# Patient Record
Sex: MALE | Race: WHITE | NOT HISPANIC OR LATINO | Employment: OTHER | URBAN - METROPOLITAN AREA
[De-identification: names, ages, dates, MRNs, and addresses within clinical notes are randomized per-mention and may not be internally consistent; named-entity substitution may affect disease eponyms.]

---

## 2017-01-09 ENCOUNTER — HOSPITAL ENCOUNTER (OUTPATIENT)
Dept: CARDIOLOGY | Facility: CLINIC | Age: 82
Discharge: HOME | End: 2017-01-09
Attending: INTERNAL MEDICINE | Admitting: INTERNAL MEDICINE
Payer: MEDICARE

## 2017-01-09 PROCEDURE — 93294 REM INTERROG EVL PM/LDLS PM: CPT | Performed by: INTERNAL MEDICINE

## 2017-01-09 PROCEDURE — 93296 REM INTERROG EVL PM/IDS: CPT

## 2017-01-09 NOTE — PROGRESS NOTES
Latitude NXT Remote PPM Device Check  AP: 1 % :100 %  Mode: DDD        Presenting Rhythm: AS/  Heart Rate: Adequate per the histograms  Sensing: Stable    Pacing Threshold: Stable    Impedance: Stable  Battery Status: 9.5 yrs remain  Atrial Arrhythmia: 5 PMT episodes. No EGMs.  Ventricular Arrhythmia: None.     Care Plan: Latitude NXT q 3 months. Pt called with the results.

## 2017-01-16 DIAGNOSIS — K21.9 GASTROESOPHAGEAL REFLUX DISEASE WITHOUT ESOPHAGITIS: Primary | ICD-10-CM

## 2017-01-16 NOTE — TELEPHONE ENCOUNTER
Pepcid      Last Written Prescription Date: 11/17/2016  Last Fill Quantity: 30,  # refills: 0   Last Office Visit with G, [facility] or [facility] prescribing provider: 12/5/2016                                         Next 5 appointments (look out 90 days)     Jan 24, 2017  3:30 PM   Office Visit with Patricia Osorio [facility] ([facility])    [address] [address] [phone]

## 2017-01-18 RX ORDER — FAMOTIDINE 40 MG/1
40 TABLET, FILM COATED ORAL AT BEDTIME
Qty: 30 TABLET | Refills: 5 | Status: SHIPPED | OUTPATIENT
Start: 2017-01-18 | End: 2017-01-24 | Stop reason: ALTCHOICE

## 2017-01-18 NOTE — TELEPHONE ENCOUNTER
Prescription approved per [facility] Refill Protocol.    Ellie FERNANDEZ RN

## 2017-01-23 ENCOUNTER — CARE COORDINATION (OUTPATIENT)
Dept: CARE COORDINATION | Facility: CLINIC | Age: 82
End: 2017-01-23

## 2017-01-23 NOTE — PROGRESS NOTES
Clinic Care Coordination Contact  Care Team Conversations    RN CC has been in communication with their daughter, Roseann, assisting her with education around this process. RN CC left a VM for Roseann to get an update on where the kids are at in the process and to see if they need any assistance.     RN CC will wait for a call back from daughter or will plan to outreach again in 5-20 business days.     Roseann Cheung RN-BSN  RN Clinic Care Coordinator  [facility]  [phone]

## 2017-01-24 ENCOUNTER — OFFICE VISIT (OUTPATIENT)
Dept: PHARMACY | Facility: CLINIC | Age: 82
End: 2017-01-24
Payer: COMMERCIAL

## 2017-01-24 ENCOUNTER — TELEPHONE (OUTPATIENT)
Dept: FAMILY MEDICINE | Facility: CLINIC | Age: 82
End: 2017-01-24

## 2017-01-24 DIAGNOSIS — N40.1 BENIGN PROSTATIC HYPERPLASIA WITH LOWER URINARY TRACT SYMPTOMS, UNSPECIFIED MORPHOLOGY: ICD-10-CM

## 2017-01-24 DIAGNOSIS — K21.9 GASTROESOPHAGEAL REFLUX DISEASE WITHOUT ESOPHAGITIS: Primary | ICD-10-CM

## 2017-01-24 DIAGNOSIS — I25.10 ARTERIOSCLEROTIC HEART DISEASE: ICD-10-CM

## 2017-01-24 DIAGNOSIS — F32.A DEPRESSION, UNSPECIFIED DEPRESSION TYPE: ICD-10-CM

## 2017-01-24 DIAGNOSIS — K59.00 CONSTIPATION, UNSPECIFIED CONSTIPATION TYPE: ICD-10-CM

## 2017-01-24 DIAGNOSIS — I10 BENIGN ESSENTIAL HYPERTENSION: ICD-10-CM

## 2017-01-24 DIAGNOSIS — M19.90 OSTEOARTHRITIS, UNSPECIFIED OSTEOARTHRITIS TYPE, UNSPECIFIED SITE: ICD-10-CM

## 2017-01-24 DIAGNOSIS — E11.8 CONTROLLED TYPE 2 DIABETES MELLITUS WITH COMPLICATION, WITHOUT LONG-TERM CURRENT USE OF INSULIN (H): ICD-10-CM

## 2017-01-24 DIAGNOSIS — E78.5 HYPERLIPIDEMIA LDL GOAL <100: ICD-10-CM

## 2017-01-24 PROCEDURE — 99605 MTMS BY PHARM NP 15 MIN: CPT | Performed by: PHARMACIST

## 2017-01-24 PROCEDURE — 99607 MTMS BY PHARM ADDL 15 MIN: CPT | Performed by: PHARMACIST

## 2017-01-24 RX ORDER — FAMOTIDINE 20 MG/1
20 TABLET, FILM COATED ORAL PRN
COMMUNITY

## 2017-01-24 NOTE — PATIENT INSTRUCTIONS
1. Stop taking fenofibrate 48 mg daily. Monitor your neck pain.  2. Start taking amlodipine 10 mg in the evening. Monitor your lightheadedness during the daytime.  3. Call the pharmacy and get a new prescription for Nitrostat.   4. Stop taking the stool softener plus stimulant laxative.  docusate sodium (stool softener) 100 mg once daily.    Your next appointment is on Tuesday, February 21st at 2:30 pm.    Patricia Osorio, PharmD  [phone] in clinic on Tuesday and Wednesday

## 2017-01-24 NOTE — TELEPHONE ENCOUNTER
Forms from [employer]   Were completed for diabetic supply and faxed back   Sent to scan     Meredith Mcbride  Clinic Station

## 2017-01-24 NOTE — MR AVS SNAPSHOT
After Visit Summary   1/24/2017    Derrick Silva    MRN: [id number]           Patient Information     Date Of Birth          3/7/1932        Visit Information        Provider Department      1/24/2017 3:30 PM Patricia Osorio [facility]        Care Instructions    1. Stop taking fenofibrate 48 mg daily. Monitor your neck pain.  2. Start taking amlodipine 10 mg in the evening. Monitor your lightheadedness during the daytime.  3. Call the pharmacy and get a new prescription for Nitrostat.   4. Stop taking the stool softener plus stimulant laxative.  docusate sodium (stool softener) 100 mg once daily.    Your next appointment is on Tuesday, February 21st at 2:30 pm.    Patricia Osorio, PharmD  [phone] in clinic on Tuesday and Wednesday          Follow-ups after your visit        Your next 10 appointments already scheduled     Jan 24, 2017  3:30 PM   Office Visit with Patricia Osorio [facility] ([facility])    [address] [address]   [phone]           Bring a current list of meds and any records pertaining to this visit.  For Physicals, please bring immunization records and any forms needing to be filled out.  Please arrive 10 minutes early to complete paperwork.            Feb 21, 2017  2:30 PM   SHORT with Patricia Osorio [facility] ([facility])    [address] [address]   [phone]            May 01, 2017  1:00 PM   Remote PPM Check with [facility]   [facility] ([facility])    [address] [address]   [phone]           This appointment is for a remote check of your pacemaker.  This is not an appointment at the office.              Who to contact     If you have questions or need follow up information about today's clinic visit or  "your schedule please contact [facility] directly at [phone].  Normal or non-critical lab and imaging results will be communicated to you by MyChart, letter or phone within 4 business days after the clinic has received the results. If you do not hear from us within 7 days, please contact the clinic through ixigohart or phone. If you have a critical or abnormal lab result, we will notify you by phone as soon as possible.  Submit refill requests through [employer] or call your pharmacy and they will forward the refill request to us. Please allow 3 business days for your refill to be completed.          Additional Information About Your Visit        ixigoharPocketSuite Information     [employer] lets you send messages to your doctor, view your test results, renew your prescriptions, schedule appointments and more. To sign up, go to [email or web address] . Click on \"Log in\" on the left side of the screen, which will take you to the Welcome page. Then click on \"Sign up Now\" on the right side of the page.     You will be asked to enter the access code listed below, as well as some personal information. Please follow the directions to create your username and password.     Your access code is: [email or web address]  Expires: 2017  3:28 PM     Your access code will  in 90 days. If you need help or a new code, please call your [place] clinic or [phone].        Care EveryWhere ID     This is your Care EveryWhere ID. This could be used by other organizations to access your [place] medical records  [id number]         Blood Pressure from Last 3 Encounters:   16 111/54   16 109/52   11/10/16 127/66    Weight from Last 3 Encounters:   16 201 lb (91.173 kg)   16 202 lb 9.6 oz (91.899 kg)   11/10/16 200 lb (90.719 kg)              Today, you had the following     No orders found for display         Today's Medication Changes          These changes are accurate as of: 17  3:28 PM.  If " you have any questions, ask your nurse or doctor.               These medicines have changed or have updated prescriptions.        Dose/Directions    PEPCID 20 MG tablet   This may have changed:  Another medication with the same name was removed. Continue taking this medication, and follow the directions you see here.   Generic drug:  famotidine   Changed by:  Patricia Osorio RPH        Dose:  20 mg   Take 20 mg by mouth as needed   Refills:  0         Stop taking these medicines if you haven't already. Please contact your care team if you have questions.     fenofibrate 48 MG tablet   Stopped by:  Patricia Osorio RPH                    Primary Care Provider Office Phone # Fax #    Stefan Erasmo Trejo [phone]       [address] [address]        Thank you!     Thank you for choosing [facility]  for your care. Our goal is always to provide you with excellent care. Hearing back from our patients is one way we can continue to improve our services. Please take a few minutes to complete the written survey that you may receive in the mail after your visit with us. Thank you!             Your Updated Medication List - Protect others around you: Learn how to safely use, store and throw away your medicines at www.disposemymeds.org.          This list is accurate as of: 1/24/17  3:28 PM.  Always use your most recent med list.                   Brand Name Dispense Instructions for use    acetaminophen 500 MG Caps      Take 1 tablet by mouth At Bedtime       amLODIPine 10 MG tablet    NORVASC    90 tablet    Take 1 tablet (10 mg) by mouth daily       aspirin 81 MG tablet     30 tablet    Take 1 tablet (81 mg) by mouth daily       blood glucose monitoring test strip    ACCU-CHEK GEOFFREY    100 each    Use to test blood sugars 1 times daily or as directed.       docusate sodium 100 MG capsule    COLACE      Take 100 mg by mouth daily       glyBURIDE 5 MG tablet    DIABETA /MICRONASE    90 tablet    Take 1 tablet (5 mg) by mouth daily (with breakfast)       guaiFENesin 200 MG Tabs tablet    ORGANIDIN     Take 2 tablets (400 mg) by mouth nightly as needed for cough       losartan 50 MG tablet    COZAAR    90 tablet    Take 1 tablet (50 mg) by mouth daily       metFORMIN 1000 MG tablet    GLUCOPHAGE    180 tablet    Take 1 tablet (1,000 mg) by mouth 2 times daily (with meals)       metoprolol 50 MG 24 hr tablet    TOPROL-XL    135 tablet    Take 1 in AM (50 mg) and 1/2 in PM (25 mg)       nitroglycerin 0.4 MG sublingual tablet    NITROSTAT    60 tablet    Place 1 tablet (0.4 mg) under the tongue every 5 minutes as needed       order for DME     1 Units    Equipment being ordered: Thigh high compression stockings       PEPCID 20 MG tablet   Generic drug:  famotidine      Take 20 mg by mouth as needed       sertraline 50 MG tablet    ZOLOFT    90 tablet    1 tablet orally daily       simvastatin 40 MG tablet    ZOCOR    90 tablet    Take 0.5 tablets (20 mg) by mouth At Bedtime       tamsulosin 0.4 MG capsule    FLOMAX    90 capsule    Take 1 capsule (0.4 mg) by mouth daily       traMADol 50 MG tablet    ULTRAM    45 tablet    Take 1 tablet (50 mg) by mouth every 8 hours as needed for severe pain maximum 3 tablet(s) per day

## 2017-01-25 DIAGNOSIS — I25.10 CAD (CORONARY ARTERY DISEASE): Primary | ICD-10-CM

## 2017-01-25 RX ORDER — NITROGLYCERIN 0.4 MG/1
0.4 TABLET SUBLINGUAL EVERY 5 MIN PRN
Qty: 60 TABLET | Refills: 0 | Status: SHIPPED | OUTPATIENT
Start: 2017-01-25

## 2017-01-25 NOTE — TELEPHONE ENCOUNTER
Nitrostat      Last Written Prescription Date: 01/30/2014  Last Fill Quantity: 60,  # refills: 2   Last Office Visit with [facility] or [facility] prescribing provider: 12/05/2016                                         Next 5 appointments (look out 90 days)     Feb 21, 2017  2:30 PM   SHORT with Patricia Osorio [facility] ([facility])    [address] [address]   [phone]                  Geovany HENRIQUEZ (R)

## 2017-01-25 NOTE — TELEPHONE ENCOUNTER
Prescription approved per [facility] Refill Protocol.    Ellie FERNANDEZ RN

## 2017-01-27 NOTE — PROGRESS NOTES
SUBJECTIVE/OBJECTIVE:                                                    Derrick Silva is a 84 year old male coming in for an initial visit for Medication Therapy Management.  He was referred to me from [facility]. Patient is here today with his wife, daughter and son.     Chief Complaint: review medications.    Allergies/ADRs: None  Tobacco: No tobacco use   Alcohol: not currently using    PMH: Reviewed in Epic    Medication Adherence: sets up own med boxes, patient's daughter asked that we review patient's pill box to make sure he is setting his medications up correctly.    GERD: taking Pepcid 20 mg at bedtime as needed for heartburn. Working well taking it as needed. This medication patient sets next to his pill box since it is as needed.    Diabetes:  Pt currently taking glyburide 5 mg daily with breakfast and metformin 1000 mg twice daily with meals, Pt is not experiencing side effects.  SMBG: one time daily.   Ranges (patient reported): blood sugars around 123 mornings.   Frequency of hypoglycemia? never.  Eye exam: up to date  Foot exam: up to date  Microalbumin is not < 30 mg/g. Pt is taking an ACEi/ARB.  Aspirin: Taking 81mg daily and denies side effects  Patient gets a yearly flu vaccine and has had both pneumonia vaccines.     Hyperlipidemia: Current therapy includes simvastatin 40 mg daily and fenofibrate 48 mg daily. Patient reports the following possible side effects: neck pain     Hypertension/CAD: Current medications include amlodipine 10 mg in the morning, metoprolol 50 mg in the morning and 25 mg at bedtime and losartan 50 mg in the morning. Patient is also on a daily aspirin 81 mg and has NitroStat in his pocket which is . Patient reports the following medication side effects: orthostatic lightheadedness.    BPH: taking tamsulosin 0.4 mg at bedtime. Patient has noticed an improvement.    Neck pain: taking tramadol 50 mg twice daily. Also taking acetaminophen 500 mg at night.  Both of these are helping with patient's neck pain.     Depression: taking sertraline 50 mg daily. Patient would like me to do the PHQ-9 at a follow up visit when he is by himself.     PHQ-9 SCORE 8/11/2015 9/9/2015 3/14/2016   Total Score 13 - -   Total Score - 17 9     Constipation: patient is currently taking Senna S once daily. He is having too loose of stools.     Current labs include:  BP Readings from Last 3 Encounters:   12/05/16 111/54   11/21/16 109/52   11/10/16 127/66     Today's Vitals: Due to time constraints, blood pressure was not taken today.   A1C      5.8   9/19/2016.  CHOL      158   9/19/2016  TRIG      178   9/19/2016  HDL       37   9/19/2016  LDL       85   9/19/2016  LDL      119   9/9/2015    Liver Function Studies -   Recent Labs   Lab Test  11/10/16   1101   PROTTOTAL  7.3   ALBUMIN  3.8   BILITOTAL  0.3   ALKPHOS  54   AST  20   ALT  37       Lab Results   Component Value Date    UCRR 86 09/19/2016    MICROL 31 09/19/2016    UMALCR 35.86* 09/19/2016       Last Basic Metabolic Panel:  NA      138   11/14/2016   POTASSIUM      4.4   11/14/2016  CHLORIDE      107   11/14/2016  BUN       33   11/14/2016  CR     1.79   11/14/2016  GFR ESTIMATE   Date Value Ref Range Status   11/14/2016 36* >60 mL/min/1.7m2 Final     Comment:     Non  GFR Calc   11/10/2016 38* >60 mL/min/1.7m2 Final     Comment:     Non  GFR Calc   09/19/2016 40* >60 mL/min/1.7m2 Final     Comment:     Non  GFR Calc     GFR ESTIMATE IF BLACK   Date Value Ref Range Status   11/14/2016 44* >60 mL/min/1.7m2 Final     Comment:      GFR Calc   11/10/2016 45* >60 mL/min/1.7m2 Final     Comment:      GFR Calc   09/19/2016 48* >60 mL/min/1.7m2 Final     Comment:      GFR Calc     TSH   Date Value Ref Range Status   09/09/2015 1.14 0.40 - 4.00 mU/L Final   ]    Most Recent Immunizations   Administered Date(s) Administered     Influenza (High  Dose) 3 valent vaccine 10/03/2016     Influenza (IIV3) 11/22/2011     Influenza Vaccine IM 3yrs+ 4 Valent IIV4 12/27/2013     Pneumococcal (PCV 13) 10/03/2016     Pneumococcal 23 valent 10/06/2000     TD (ADULT, 7+) 10/06/2000     TDAP (ADACEL AGES 11-64) 01/15/2014     Varicella 01/03/2002     ASSESSMENT:                                                       Current medications were reviewed today. Medicare Part D topics discussed:OTC products, Reminder to refill/ medication, Medication changes and Timing of medication    Medication Adherence: no issues identified, patient had his pill box set up correctly.    GERD: stable    Diabetes: Stable. Patient is meeting A1c goal of < 7%. Glyburide is on the Beer's list due to long acting metabolites contributing to greater risk of prolonged hyperglycemia. Currently not a problem for patient, not having problems with hypoglycemia. I will revisit this at follow up and switch to glipizide.     Hyperlipidemia: Needs Improvement. Pt is on moderate intensity statin which is indicated based on 2013 ACC/AHA guidelines for lipid management. Statins are no longer recommended to be used with fenofibrates due to increased risk of muscle pain. Per 2013 ACC/ADA guidelines triglycerides are recommended to be treated if >/= 500, fenofibrate was added when patient's triglycerides were >200. The addition of the fenofibrate may be contributing to patient's neck pain. I will stop the fenofibrate and monitor neck pain.     Hypertension/CAD: Stable. Patient is meeting BP goal of < 140/90mmHg. I asked patient to start taking amlodipine in the evening to help prevent lightheadedness and improved effectiveness.     BPH: stable    Neck pain: stable - will continue to monitor since stopping fenofibrate.     Depression: will revisit this at follow up and do a PHQ-9.    Constipation: asked patient to stop taking Senna S and switch to docusate sodium 100 mg once daily (see Plan).     PLAN:                                                       1. Stop taking fenofibrate 48 mg daily. Monitor your neck pain.  2. Start taking amlodipine 10 mg in the evening. Monitor your lightheadedness during the daytime.  3. Call the pharmacy and get a new Nitrostat.   4. Stop taking the stool softener plus stimulant laxative.  docusate sodium (stool softener) 100 mg once daily.    At follow up do PHQ-9. Change glyburide to glipizide XL 5 mg daily.    I spent 60 minutes with this patient today (an extra 15 minutes was spent creating the Medication Action Plan).  All changes were made via collaborative practice agreement with Stefan Trejo. A copy of the visit note was provided to the patient's primary care provider.    Will follow up in 1 month.    The patient was given a summary of these recommendations as an after visit summary.     Patricia Osorio, PharmD  Medication Therapy Management

## 2017-02-01 ENCOUNTER — TELEPHONE (OUTPATIENT)
Dept: FAMILY MEDICINE | Facility: CLINIC | Age: 82
End: 2017-02-01

## 2017-02-02 ENCOUNTER — MEDICAL CORRESPONDENCE (OUTPATIENT)
Dept: HEALTH INFORMATION MANAGEMENT | Facility: CLINIC | Age: 82
End: 2017-02-02

## 2017-02-03 ENCOUNTER — OFFICE VISIT (OUTPATIENT)
Dept: FAMILY MEDICINE | Facility: CLINIC | Age: 82
End: 2017-02-03
Payer: COMMERCIAL

## 2017-02-03 VITALS
TEMPERATURE: 96.7 F | SYSTOLIC BLOOD PRESSURE: 115 MMHG | OXYGEN SATURATION: 99 % | HEIGHT: 66 IN | BODY MASS INDEX: 31.66 KG/M2 | HEART RATE: 70 BPM | DIASTOLIC BLOOD PRESSURE: 59 MMHG | WEIGHT: 197 LBS

## 2017-02-03 DIAGNOSIS — J06.9 UPPER RESPIRATORY TRACT INFECTION, UNSPECIFIED TYPE: Primary | ICD-10-CM

## 2017-02-03 DIAGNOSIS — E11.8 CONTROLLED TYPE 2 DIABETES MELLITUS WITH COMPLICATION, WITHOUT LONG-TERM CURRENT USE OF INSULIN (H): ICD-10-CM

## 2017-02-03 DIAGNOSIS — H61.23 BILATERAL IMPACTED CERUMEN: ICD-10-CM

## 2017-02-03 PROCEDURE — 69210 REMOVE IMPACTED EAR WAX UNI: CPT | Mod: 50 | Performed by: FAMILY MEDICINE

## 2017-02-03 PROCEDURE — 99214 OFFICE O/P EST MOD 30 MIN: CPT | Mod: 25 | Performed by: FAMILY MEDICINE

## 2017-02-03 RX ORDER — AZITHROMYCIN 250 MG/1
250 TABLET, FILM COATED ORAL DAILY
Qty: 6 TABLET | Refills: 1 | Status: SHIPPED | OUTPATIENT
Start: 2017-02-03 | End: 2017-03-28

## 2017-02-03 NOTE — PROGRESS NOTES
Clinic Care Coordination Contact  Care Team Conversations    RN CC received a message from pt's daughter, Roseann. She wanted to let RN CC know that she and her siblinigs are looking into MATILDE and need not assistance at this time. She also notes they met with MTM and it was a great visit.     RN CC will outreach in 1-2 months and plan to close if pt/family needs no further resources.     Roseann Cheung, RN-BSN  RN Clinic Care Coordinator  [facility]  [phone]

## 2017-02-03 NOTE — PROGRESS NOTES
"a  SUBJECTIVE:                                                    Derrick Silva is 84 year old male   Chief Complaint   Patient presents with     URI     ENT Symptoms             Symptoms: cc Present Absent Comment   Fever/Chills  x  Chills at night   Fatigue  x     Muscle Aches  x     Eye Irritation   x    Sneezing   x    Nasal Rashid/Drg  x  Nasal drainage   Sinus Pressure/Pain x x  A lot of sinus pressure   Loss of smell   x    Dental pain   x    Sore Throat   x    Swollen Glands  x  Sore neck glands   Ear Pain/Fullness   x    Cough x x  Dry cough   Wheeze   x    Chest Pain  x  intermittent   Shortness of breath   x    Rash   x    Other x x  \"horrible headache\"     Symptom duration:  1 week   Symptom severity: Moderate   Treatments tried:  Cough drops, cough syrup   Contacts:  None         Problem list and histories reviewed & adjusted, as indicated.  Additional history: as documented    Patient Active Problem List   Diagnosis     Osteoarthritis     Benign essential hypertension     Prostate cancer (H)     Adenomatous polyps     Onychia of toe     GERD (gastroesophageal reflux disease)     Arteriosclerotic heart disease     BPH (benign prostatic hyperplasia)     Hyperlipidemia LDL goal <100     Advance Care Planning     Aortic stenosis     Type 2 diabetes mellitus, controlled, with renal complications (H)     Diabetes mellitus type 2, controlled, with complications (H)     Obesity     Past Surgical History   Procedure Laterality Date     Esophagoscopy, gastroscopy, duodenoscopy (egd), combined  10/17/2011     Procedure:COMBINED ESOPHAGOSCOPY, GASTROSCOPY, DUODENOSCOPY (EGD), BIOPSY SINGLE OR MULTIPLE; Surgeon:TOÑO TAYLOR; Location:[facility]     Carpal tunnel release rt/lt  2005     Colonoscopy  2008     Angiogram  2010     Hemorrhoidectomy       Implant pacemaker       Release carpal tunnel  8/15/2014     Procedure: RELEASE CARPAL TUNNEL;  Surgeon: Thom Hill MD;  Location: [facility]       Social " History   Substance Use Topics     Smoking status: Never Smoker      Smokeless tobacco: Never Used     Alcohol Use: Yes      Comment: rare     Family History   Problem Relation Age of Onset     HEART DISEASE Sister      HEART DISEASE Brother      HEART DISEASE Brother      Alcohol/Drug Brother      HEART DISEASE Sister      Neurologic Disorder Daughter      parkinson's         Current Outpatient Prescriptions   Medication Sig Dispense Refill     azithromycin (ZITHROMAX Z-ANDREA) 250 MG tablet Take 1 tablet (250 mg) by mouth daily Two tablets first day, then one tablet daily for four days 6 tablet 1     nitroglycerin (NITROSTAT) 0.4 MG sublingual tablet Place 1 tablet (0.4 mg) under the tongue every 5 minutes as needed 60 tablet 0     famotidine (PEPCID) 20 MG tablet Take 20 mg by mouth as needed       glyBURIDE (DIABETA /MICRONASE) 5 MG tablet Take 1 tablet (5 mg) by mouth daily (with breakfast) 90 tablet 3     traMADol (ULTRAM) 50 MG tablet Take 1 tablet (50 mg) by mouth every 8 hours as needed for severe pain maximum 3 tablet(s) per day 45 tablet 0     metFORMIN (GLUCOPHAGE) 1000 MG tablet Take 1 tablet (1,000 mg) by mouth 2 times daily (with meals) 180 tablet 1     tamsulosin (FLOMAX) 0.4 MG 24 hr capsule Take 1 capsule (0.4 mg) by mouth daily 90 capsule 3     simvastatin (ZOCOR) 40 MG tablet Take 0.5 tablets (20 mg) by mouth At Bedtime 90 tablet 3     amLODIPine (NORVASC) 10 MG tablet Take 1 tablet (10 mg) by mouth daily 90 tablet 3     metoprolol (TOPROL-XL) 50 MG 24 hr tablet Take 1 in AM (50 mg) and 1/2 in PM (25 mg) 135 tablet 5     acetaminophen 500 MG CAPS Take 1 tablet by mouth At Bedtime       guaiFENesin (ORGANIDIN) 200 MG TABS Take 2 tablets (400 mg) by mouth nightly as needed for cough  0     sertraline (ZOLOFT) 50 MG tablet 1 tablet orally daily 90 tablet 3     losartan (COZAAR) 50 MG tablet Take 1 tablet (50 mg) by mouth daily 90 tablet 3     aspirin 81 MG tablet Take 1 tablet (81 mg) by mouth daily 30  "tablet      docusate sodium (COLACE) 100 MG capsule Take 100 mg by mouth daily        blood glucose monitoring (ACCU-CHEK GEOFFREY) test strip Use to test blood sugars 1 times daily or as directed. 100 each 2     ORDER FOR DME Equipment being ordered: Thigh high compression stockings 1 Units 1     Allergies   Allergen Reactions     Nkda [No Known Drug Allergies]      Recent Labs   Lab Test  11/14/16   0916  11/10/16   1101  09/19/16   0847  07/12/16   0852  03/14/16   0901  09/09/15   1523  04/13/15   1142   04/23/14   0846   05/16/13   1137   A1C   --    --   5.8  6.0  6.0  6.0  6.7*   < >  5.9   --    --    LDL   --    --   85  61   --   119  47   < >  53   --    --    HDL   --    --   37*  33*   --    --   31*   --   31*   --    --    TRIG   --    --   178*  207*   --    --   277*   --   220*   --    --    ALT   --   37   --    --    --    --   36   --    --    --   29   CR  1.79*  1.74*  1.65*  1.54*  1.29*  1.36*  1.24   < >  1.31*   < >  1.10   GFRESTIMATED  36*  38*  40*  43*  53*  50*  56*   < >  52*   < >  64   GFRESTBLACK  44*  45*  48*  52*  64  60*  67   < >  63   < >  78   POTASSIUM  4.4  4.6  4.4  4.5  4.2  4.1  4.2   < >  4.6   < >  4.2   TSH   --    --    --    --    --   1.14   --    --   1.61   --    --     < > = values in this interval not displayed.      BP Readings from Last 3 Encounters:   02/03/17 115/59   12/05/16 111/54   11/21/16 109/52    Wt Readings from Last 3 Encounters:   02/03/17 197 lb (89.359 kg)   12/05/16 201 lb (91.173 kg)   11/21/16 202 lb 9.6 oz (91.899 kg)         ROS:  Constitutional, HEENT, cardiovascular, pulmonary, gi and gu systems are negative, except as otherwise noted.    OBJECTIVE:                                                    /59 mmHg  Pulse 70  Temp(Src) 96.7  F (35.9  C) (Tympanic)  Ht 5' 6\" (1.676 m)  Wt 197 lb (89.359 kg)  BMI 31.81 kg/m2  SpO2 99%  GENERAL APPEARANCE ADULT: Alert, no acute distress, obese  HENT: right TM not visualized secondary to " cerumen, left TM not visualized secondary to cerumen, after cerumen cleared TM appear normal. throat/mouth:normal, mucous membranes moist,  cobblestoning and thick mucous posterior pharynx.  RESP: lungs clear to auscultation   CV: normal rate, regular rhythm, no murmur or gallop  Diagnostic Test Results:  none     ASSESSMENT/PLAN:                                                    1. Upper respiratory tract infection, unspecified type  Viral vs bacterial.  Trial of antibiotics but if not effective viral infection and self limiting.  If effective and recurs will repeat, see patient instructions.  - azithromycin (ZITHROMAX Z-ANDREA) 250 MG tablet; Take 1 tablet (250 mg) by mouth daily Two tablets first day, then one tablet daily for four days  Dispense: 6 tablet; Refill: 1    2. Controlled type 2 diabetes mellitus with complication, without long-term current use of insulin (H)  At goal    3. Bilateral impacted cerumen    Cerumenosis is noted.  Wax is removed by syringing with warm sterile water and manual debridement with blue Bionix plastic cerumen curette.  Both ears were treated and large amount of cerumen was removed from both ear canals.  Slight discomfort at end, no bleeding or trauma noted to ear canals or TMs on inspection with otoscope. Treatment initiated by CMA and completed by myself.     - REMOVE IMPACTED CERUMEN    Oxana Piper MD  [facility]

## 2017-02-03 NOTE — PATIENT INSTRUCTIONS
Thank you for choosing [facility].  You may be receiving a survey in the mail from [facility] regarding your visit today.  Please take a few minutes to complete and return the survey to let us know how we are doing.      If you have questions or concerns, please contact us via [employer] or you can contact your care team at [phone].    Our Clinic hours are:  Monday 6:40 am  to 7:00 pm  Tuesday -Friday 6:40 am to 5:00 pm    The [place] outpatient lab hours are:  Monday - Friday 6:10 am to 4:45 pm  Saturdays 7:00 am to 11:00 am  Appointments are required, call [phone]    If you have clinical questions after hours or would like to schedule an appointment,  call the clinic at [phone].   If symptoms resolve with the zithromax and then recur on day 6-8 repeat the zithromax.  If symptoms do not improve or do not recur don't repeat the zithromax.  Take only one tab a day if repeating the pac.  Zithromax is an antibiotic and works against bacteria.  If it didn't work then you don't have a bacterial infection, you have a viral infection and zpac or any other antibiotic won't work.  Recommend rest, fluids and other supportive cares so your immune system can clear the viral infection.  If your illness is getting worse see MD.    Symptomatic therapy suggested: push fluids, rest, gargle warm salt water, use vaporizer or mist needed , use acetaminophen, ibuprofen, decongestant of choice as needed and Return office visit if symptoms persist or worsen. Call or return to clinic prn if these symptoms worsen or fail to improve as anticipated.

## 2017-02-03 NOTE — MR AVS SNAPSHOT
After Visit Summary   2/3/2017    Derrick Silva    MRN: [id number]           Patient Information     Date Of Birth          3/7/1932        Visit Information        Provider Department      2/3/2017 8:20 AM Oxana Piper MD [facility]        Today's Diagnoses     Upper respiratory tract infection, unspecified type    -  1     Controlled type 2 diabetes mellitus with complication, without long-term current use of insulin (H)           Care Instructions          Thank you for choosing [facility].  You may be receiving a [facility] in [facility].  Please take a few minutes to complete and return the survey to let us know how we are doing.      If you have questions or concerns, please contact us via [employer] or you can contact your care team at [phone].    Our Clinic hours are:  Monday 6:40 am  to 7:00 pm  Tuesday -Friday 6:40 am to 5:00 pm    The [place] outpatient lab hours are:  Monday - Friday 6:10 am to 4:45 pm  Saturdays 7:00 am to 11:00 am  Appointments are required, call [phone]    If you have clinical questions after hours or would like to schedule an appointment,  call the clinic at [phone].   If symptoms resolve with the zithromax and then recur on day 6-8 repeat the zithromax.  If symptoms do not improve or do not recur don't repeat the zithromax.  Take only one tab a day if repeating the pac.  Zithromax is an antibiotic and works against bacteria.  If it didn't work then you don't have a bacterial infection, you have a viral infection and zpac or any other antibiotic won't work.  Recommend rest, fluids and other supportive cares so your immune system can clear the viral infection.  If your illness is getting worse see MD.    Symptomatic therapy suggested: push fluids, rest, gargle warm salt water, use vaporizer or mist needed , use acetaminophen, ibuprofen, decongestant of choice as needed and Return  "office visit if symptoms persist or worsen. Call or return to clinic prn if these symptoms worsen or fail to improve as anticipated.          Follow-ups after your visit        Your next 10 appointments already scheduled     Feb 21, 2017  2:30 PM   SHORT with Patricia Osorio, [facility] ([facility])    [address] [place]  [facility] [address]   [phone]            May 01, 2017  1:00 PM   Remote PPM Check with [facility]   [facility] ([facility])    [address]  [facility] [address]   [phone]           This appointment is for a remote check of your pacemaker.  This is not an appointment at the office.              Who to contact     If you have questions or need follow up information about today's clinic visit or your schedule please contact [facility] directly at [phone].  Normal or non-critical lab and imaging results will be communicated to you by Victoria Plumbhart, letter or phone within 4 business days after the clinic has received the results. If you do not hear from us within 7 days, please contact the clinic through Victoria Plumbhart or phone. If you have a critical or abnormal lab result, we will notify you by phone as soon as possible.  Submit refill requests through [facility] or call your pharmacy and they will forward the refill request to us. Please allow 3 business days for your refill to be completed.          Additional Information About Your Visit        [facility] Information     [facility] lets you send messages to your doctor, view your test results, renew your prescriptions, schedule appointments and more. To sign up, go to [email or web address] . Click on \"Log in\" on the left side of the screen, which will take you to the Welcome page. Then click on \"Sign up Now\" on the right side of the page.     You will be asked to enter the access code listed below, as well as some personal " "information. Please follow the directions to create your username and password.     Your access code is: [email or web address]  Expires: 2017  3:28 PM     Your access code will  in 90 days. If you need help or a new code, please call your [facility] or [phone].        Care EveryWhere ID     This is your Care EveryWhere ID. This could be used by other organizations to access your [place] medical records  [id number]        Your Vitals Were     Pulse Temperature Height BMI (Body Mass Index) Pulse Oximetry       70 96.7  F (35.9  C) (Tympanic) 5' 6\" (1.676 m) 31.81 kg/m2 99%        Blood Pressure from Last 3 Encounters:   17 115/59   16 111/54   16 109/52    Weight from Last 3 Encounters:   17 197 lb (89.359 kg)   16 201 lb (91.173 kg)   16 202 lb 9.6 oz (91.899 kg)              Today, you had the following     No orders found for display         Today's Medication Changes          These changes are accurate as of: 2/3/17  8:59 AM.  If you have any questions, ask your nurse or doctor.               Start taking these medicines.        Dose/Directions    azithromycin 250 MG tablet   Commonly known as:  ZITHROMAX Z-ANDREA   Used for:  Upper respiratory tract infection, unspecified type   Started by:  Oxana Piper MD        Dose:  250 mg   Take 1 tablet (250 mg) by mouth daily Two tablets first day, then one tablet daily for four days   Quantity:  6 tablet   Refills:  1            Where to get your medicines      These medications were sent to [facility] - [place], [place] - [address]  [address] [address]     Phone:  [phone]    - azithromycin 250 MG tablet             Primary Care Provider Office Phone # Fax #    Stefan Trejo [phone]       [facility]        [address] [address]        Thank you!     Thank you for choosing [facility]  for " your care. Our goal is always to provide you with excellent care. Hearing back from our patients is one way we can continue to improve our services. Please take a few minutes to complete the written survey that you may receive in the mail after your visit with us. Thank you!             Your Updated Medication List - Protect others around you: Learn how to safely use, store and throw away your medicines at www.disposemymeds.org.          This list is accurate as of: 2/3/17  8:59 AM.  Always use your most recent med list.                   Brand Name Dispense Instructions for use    acetaminophen 500 MG Caps      Take 1 tablet by mouth At Bedtime       amLODIPine 10 MG tablet    NORVASC    90 tablet    Take 1 tablet (10 mg) by mouth daily       aspirin 81 MG tablet     30 tablet    Take 1 tablet (81 mg) by mouth daily       azithromycin 250 MG tablet    ZITHROMAX Z-ANDREA    6 tablet    Take 1 tablet (250 mg) by mouth daily Two tablets first day, then one tablet daily for four days       blood glucose monitoring test strip    ACCU-CHEK GEOFFREY    100 each    Use to test blood sugars 1 times daily or as directed.       docusate sodium 100 MG capsule    COLACE     Take 100 mg by mouth daily       glyBURIDE 5 MG tablet    DIABETA /MICRONASE    90 tablet    Take 1 tablet (5 mg) by mouth daily (with breakfast)       guaiFENesin 200 MG Tabs tablet    ORGANIDIN     Take 2 tablets (400 mg) by mouth nightly as needed for cough       losartan 50 MG tablet    COZAAR    90 tablet    Take 1 tablet (50 mg) by mouth daily       metFORMIN 1000 MG tablet    GLUCOPHAGE    180 tablet    Take 1 tablet (1,000 mg) by mouth 2 times daily (with meals)       metoprolol 50 MG 24 hr tablet    TOPROL-XL    135 tablet    Take 1 in AM (50 mg) and 1/2 in PM (25 mg)       nitroglycerin 0.4 MG sublingual tablet    NITROSTAT    60 tablet    Place 1 tablet (0.4 mg) under the tongue every 5 minutes as needed       order for DME     1 Units    Equipment  being ordered: Thigh high compression stockings       PEPCID 20 MG tablet   Generic drug:  famotidine      Take 20 mg by mouth as needed       sertraline 50 MG tablet    ZOLOFT    90 tablet    1 tablet orally daily       simvastatin 40 MG tablet    ZOCOR    90 tablet    Take 0.5 tablets (20 mg) by mouth At Bedtime       tamsulosin 0.4 MG capsule    FLOMAX    90 capsule    Take 1 capsule (0.4 mg) by mouth daily       traMADol 50 MG tablet    ULTRAM    45 tablet    Take 1 tablet (50 mg) by mouth every 8 hours as needed for severe pain maximum 3 tablet(s) per day

## 2017-02-03 NOTE — NURSING NOTE
"Chief Complaint   Patient presents with     URI       Initial /59 mmHg  Pulse 70  Temp(Src) 96.7  F (35.9  C) (Tympanic)  Ht 5' 6\" (1.676 m)  Wt 197 lb (89.359 kg)  BMI 31.81 kg/m2  SpO2 99% Estimated body mass index is 31.81 kg/(m^2) as calculated from the following:    Height as of this encounter: 5' 6\" (1.676 m).    Weight as of this encounter: 197 lb (89.359 kg).  BP completed using cuff size: regular  "

## 2017-02-21 ENCOUNTER — OFFICE VISIT (OUTPATIENT)
Dept: PHARMACY | Facility: CLINIC | Age: 82
End: 2017-02-21
Payer: COMMERCIAL

## 2017-02-21 VITALS — HEART RATE: 71 BPM | DIASTOLIC BLOOD PRESSURE: 65 MMHG | SYSTOLIC BLOOD PRESSURE: 119 MMHG

## 2017-02-21 DIAGNOSIS — E11.22 CONTROLLED TYPE 2 DIABETES MELLITUS WITH STAGE 3 CHRONIC KIDNEY DISEASE, WITHOUT LONG-TERM CURRENT USE OF INSULIN (H): Primary | ICD-10-CM

## 2017-02-21 DIAGNOSIS — K59.00 CONSTIPATION, UNSPECIFIED CONSTIPATION TYPE: ICD-10-CM

## 2017-02-21 DIAGNOSIS — F32.A DEPRESSION, UNSPECIFIED DEPRESSION TYPE: ICD-10-CM

## 2017-02-21 DIAGNOSIS — M19.90 OSTEOARTHRITIS, UNSPECIFIED OSTEOARTHRITIS TYPE, UNSPECIFIED SITE: ICD-10-CM

## 2017-02-21 DIAGNOSIS — I10 BENIGN ESSENTIAL HYPERTENSION: ICD-10-CM

## 2017-02-21 DIAGNOSIS — N18.30 CONTROLLED TYPE 2 DIABETES MELLITUS WITH STAGE 3 CHRONIC KIDNEY DISEASE, WITHOUT LONG-TERM CURRENT USE OF INSULIN (H): Primary | ICD-10-CM

## 2017-02-21 PROCEDURE — 99606 MTMS BY PHARM EST 15 MIN: CPT | Performed by: PHARMACIST

## 2017-02-21 PROCEDURE — 99607 MTMS BY PHARM ADDL 15 MIN: CPT | Performed by: PHARMACIST

## 2017-02-21 RX ORDER — GLIPIZIDE 5 MG/1
5 TABLET, FILM COATED, EXTENDED RELEASE ORAL DAILY
Qty: 90 TABLET | Refills: 1 | Status: SHIPPED | OUTPATIENT
Start: 2017-02-21 | End: 2017-10-11

## 2017-02-21 NOTE — PATIENT INSTRUCTIONS
1. When your prescription for glyburide 5 mg is done - switch to glipizide XL 5 mg once daily with breakfast. I faxed a prescription to your pharmacy. The reason for this is because glyburide stays in your body for a long time so the risk of your blood sugars going too low is increased. Glyburide is not recommended to be used for people that are >60 years old.    Your next appointment is on Tuesday, April 4th at 2:30 pm.    Patricia Osorio, PharmD  [phone] in clinic on Tuesday and Wednesday

## 2017-02-21 NOTE — MR AVS SNAPSHOT
After Visit Summary   2/21/2017    Derrick Silva    MRN: [id number]           Patient Information     Date Of Birth          3/7/1932        Visit Information        Provider Department      2/21/2017 2:30 PM Patricia Osorio [facility]        Today's Diagnoses     Controlled type 2 diabetes mellitus with stage 3 chronic kidney disease, without long-term current use of insulin (H)    -  1      Care Instructions    1. When your prescription for glyburide 5 mg is done - switch to glipizide XL 5 mg once daily with breakfast. I faxed a prescription to your pharmacy. The reason for this is because glyburide stays in your body for a long time so the risk of your blood sugars going too low is increased. Glyburide is not recommended to be used for people that are >60 years old.    Your next appointment is on Tuesday, April 4th at 2:30 pm.    Patricia Osorio, PharmD  [phone] in clinic on Tuesday and Wednesday                   Follow-ups after your visit        Your next 10 appointments already scheduled     Apr 04, 2017  2:30 PM CDT   SHORT with Patricia Osorio [facility] ([facility])    [address] [id number] [phone]            May 01, 2017  1:00 PM CDT   Remote PPM Check with [facility] [facility]   [facility] ([facility])    [address] [address]   [phone]           This appointment is for a remote check of your pacemaker.  This is not an appointment at the office.              Who to contact     If you have questions or need follow up information about today's clinic visit or your schedule please contact [facility] directly at [phone].  Normal or non-critical lab and imaging results will be communicated to you by MyChart, letter or phone within 4 business days after the clinic has received the  "results. If you do not hear from us within 7 days, please contact the clinic through [facility] or phone. If you have a critical or abnormal lab result, we will notify you by phone as soon as possible.  Submit refill requests through [facility] or call your pharmacy and they will forward the refill request to us. Please allow 3 business days for your refill to be completed.          Additional Information About Your Visit        Explain My [facility] Information     [facility] lets you send messages to your doctor, view your test results, renew your prescriptions, schedule appointments and more. To sign up, go to [email or web address] . Click on \"Log in\" on the left side of the screen, which will take you to the Welcome page. Then click on \"Sign up Now\" on the right side of the page.     You will be asked to enter the access code listed below, as well as some personal information. Please follow the directions to create your username and password.     Your access code is: [email or web address]  Expires: 2017  3:28 PM     Your access code will  in 90 days. If you need help or a new code, please call your [place] clinic or [phone].        Care EveryWhere ID     This is your Care EveryWhere ID. This could be used by other organizations to access your [place] medical records  [id number]         Blood Pressure from Last 3 Encounters:   17 115/59   16 111/54   16 109/52    Weight from Last 3 Encounters:   17 197 lb (89.4 kg)   16 201 lb (91.2 kg)   16 202 lb 9.6 oz (91.9 kg)              Today, you had the following     No orders found for display         Today's Medication Changes          These changes are accurate as of: 17  3:11 PM.  If you have any questions, ask your nurse or doctor.               Start taking these medicines.        Dose/Directions    glipiZIDE 5 MG 24 hr tablet   Commonly known as:  glipiZIDE XL   Used for:  Controlled type 2 diabetes mellitus with stage 3 chronic " kidney disease, without long-term current use of insulin (H)        Dose:  5 mg   Take 1 tablet (5 mg) by mouth daily   Quantity:  90 tablet   Refills:  1         Stop taking these medicines if you haven't already. Please contact your care team if you have questions.     glyBURIDE 5 MG tablet   Commonly known as:  DIABETA /MICRONASE                Where to get your medicines      These medications were sent to [facility] - [place], [place] - [address]   [address] [facility] [address]     Phone:  [phone]     glipiZIDE 5 MG 24 hr tablet                Primary Care Provider Office Phone # Fax #    Stefan Erasmo Trejo [phone]       [facility]        [address] [place] [address]        Thank you!     Thank you for choosing [facility]  for your care. Our goal is always to provide you with excellent care. Hearing back from our patients is one way we can continue to improve our services. Please take a few minutes to complete the written survey that you may receive in the mail after your visit with us. Thank you!             Your Updated Medication List - Protect others around you: Learn how to safely use, store and throw away your medicines at www.disposemymeds.org.          This list is accurate as of: 2/21/17  3:11 PM.  Always use your most recent med list.                   Brand Name Dispense Instructions for use    acetaminophen 500 MG Caps      Take 1 tablet by mouth At Bedtime       amLODIPine 10 MG tablet    NORVASC    90 tablet    Take 1 tablet (10 mg) by mouth daily       aspirin 81 MG tablet     30 tablet    Take 1 tablet (81 mg) by mouth daily       azithromycin 250 MG tablet    ZITHROMAX Z-ANDREA    6 tablet    Take 1 tablet (250 mg) by mouth daily Two tablets first day, then one tablet daily for four days       blood glucose monitoring test strip    ACCU-CHEK GEOFFREY    100 each    Use to test blood  sugars 1 times daily or as directed.       docusate sodium 100 MG capsule    COLACE     Take 100 mg by mouth daily       glipiZIDE 5 MG 24 hr tablet    glipiZIDE XL    90 tablet    Take 1 tablet (5 mg) by mouth daily       guaiFENesin 200 MG Tabs tablet    ORGANIDIN     Take 2 tablets (400 mg) by mouth nightly as needed for cough       losartan 50 MG tablet    COZAAR    90 tablet    Take 1 tablet (50 mg) by mouth daily       menthol (Topical Analgesic) 2.5% 2.5 % Gel topical gel    BENGAY VANISHIN SCENT     Apply to neck twice daily as needed for pain.       metFORMIN 1000 MG tablet    GLUCOPHAGE    180 tablet    Take 1 tablet (1,000 mg) by mouth 2 times daily (with meals)       metoprolol 50 MG 24 hr tablet    TOPROL-XL    135 tablet    Take 1 in AM (50 mg) and 1/2 in PM (25 mg)       nitroglycerin 0.4 MG sublingual tablet    NITROSTAT    60 tablet    Place 1 tablet (0.4 mg) under the tongue every 5 minutes as needed       order for DME     1 Units    Equipment being ordered: Thigh high compression stockings       PEPCID 20 MG tablet   Generic drug:  famotidine      Take 20 mg by mouth as needed       sertraline 50 MG tablet    ZOLOFT    90 tablet    1 tablet orally daily       simvastatin 40 MG tablet    ZOCOR    90 tablet    Take 0.5 tablets (20 mg) by mouth At Bedtime       tamsulosin 0.4 MG capsule    FLOMAX    90 capsule    Take 1 capsule (0.4 mg) by mouth daily

## 2017-02-21 NOTE — PROGRESS NOTES
SUBJECTIVE/OBJECTIVE:                                                    Derrick Silva is a 84 year old male coming in for a follow-up visit for Medication Therapy Management.  He was referred to me from [facility].     Chief Complaint: none. Follow up visit from 2017    Allergies/ADRs: None  Tobacco: No tobacco use   Alcohol: not currently using    PMH: Reviewed in Epic    Medication Adherence: no issues reported by patient.     Diabetes:  Pt currently taking glyburide 5 mg daily with breakfast and metformin 1000 mg twice daily with meals, Pt is not experiencing side effects.  SMBG: one time daily.   Ranges (patient reported): morning 80 and bedtime 123 last week.   Frequency of hypoglycemia? never.  Eye exam: up to date  Foot exam: up to date  Microalbumin is not < 30 mg/g. Pt is taking an ACEi/ARB.  Aspirin: Taking 81mg daily and denies side effects  Patient gets a yearly flu vaccine and has had both pneumonia vaccines.     Hypertension/CAD: Current medications include amlodipine 10 mg in the evening (this was changed to evening at our last visit), metoprolol 50 mg in the morning and 25 mg at bedtime and losartan 50 mg in the morning. Patient is also on a daily aspirin 81 mg and has NitroStat in his pocket which is . Patient reports the following medication side effects: none - he no longer is lightheaded during the day. He also states that he is no longer falling asleep during the day. He is picking up a new prescription for NitroStat today from his pharmacy.     Neck pain: Taking acetaminophen 500 mg at night. Was taking tramadol patient states it did not help his neck pain. No change since stopping fenofibrate. He has been using menthol gel twice daily which helps his neck pain. If it doesn't improve soon, he will see his PCP.      Depression: taking sertraline 50 mg daily. PHQ-9 done today. Patient feels mood is improved.    PHQ-9 SCORE 2015 3/14/2016 2017   Total Score - - -    Total Score 17 9 6     Constipation: stopped taking his laxative (Senna S). Hasn't needed to use the stool softener.      Current labs include:  BP Readings from Last 3 Encounters:   02/03/17 115/59   12/05/16 111/54   11/21/16 109/52     Today's Vitals: /65  Pulse 71  Lab Results   Component Value Date    A1C 5.8 09/19/2016   .  Lab Results   Component Value Date    CHOL 158 09/19/2016     Lab Results   Component Value Date    TRIG 178 09/19/2016     Lab Results   Component Value Date    HDL 37 09/19/2016     Lab Results   Component Value Date    LDL 85 09/19/2016     09/09/2015       Liver Function Studies -   Recent Labs   Lab Test  11/10/16   1101   PROTTOTAL  7.3   ALBUMIN  3.8   BILITOTAL  0.3   ALKPHOS  54   AST  20   ALT  37       Lab Results   Component Value Date    UCRR 86 09/19/2016    MICROL 31 09/19/2016    UMALCR 35.86 (H) 09/19/2016       Last Basic Metabolic Panel:  Lab Results   Component Value Date     11/14/2016      Lab Results   Component Value Date    POTASSIUM 4.4 11/14/2016     Lab Results   Component Value Date    CHLORIDE 107 11/14/2016     Lab Results   Component Value Date    BUN 33 11/14/2016     Lab Results   Component Value Date    CR 1.79 11/14/2016     GFR Estimate   Date Value Ref Range Status   11/14/2016 36 (L) >60 mL/min/1.7m2 Final     Comment:     Non  GFR Calc   11/10/2016 38 (L) >60 mL/min/1.7m2 Final     Comment:     Non  GFR Calc   09/19/2016 40 (L) >60 mL/min/1.7m2 Final     Comment:     Non  GFR Calc     GFR Estimate If Black   Date Value Ref Range Status   11/14/2016 44 (L) >60 mL/min/1.7m2 Final     Comment:      GFR Calc   11/10/2016 45 (L) >60 mL/min/1.7m2 Final     Comment:      GFR Calc   09/19/2016 48 (L) >60 mL/min/1.7m2 Final     Comment:      GFR Calc     TSH   Date Value Ref Range Status   09/09/2015 1.14 0.40 - 4.00 mU/L Final   ]    Most  Recent Immunizations   Administered Date(s) Administered     Influenza (High Dose) 3 valent vaccine 10/03/2016     Influenza (IIV3) 11/22/2011     Influenza Vaccine IM 3yrs+ 4 Valent IIV4 12/27/2013     Pneumococcal (PCV 13) 10/03/2016     Pneumococcal 23 valent 10/06/2000     TD (ADULT, 7+) 10/06/2000     TDAP (ADACEL AGES 11-64) 01/15/2014     Varicella 01/03/2002     ASSESSMENT:                                                    Current medications were reviewed today as discussed above.      Medication Adherence: no issues identified    Diabetes: Stable. Patient is meeting A1c goal of < 7%. Glyburide is on the Beer's List. Not recommended to be used in the elderly due to metabolites that have a long 1/2 life. Patient currently is tolerating well, although his kidney function is declining. I will change him to glipizide.     Hypertension/CAD: improved. Patient is meeting blood pressure goal of <140/80.    Neck pain: stable. If no improvement recommend patient makes an appointment with his PCP.     Depression: stable    Constipation: stable     PLAN:                                                      1. 1. When your prescription for glyburide 5 mg is done - switch to glipizide XL 5 mg once daily with breakfast. I faxed a prescription to your pharmacy. The reason for this is because glyburide stays in your body for a long time so the risk of your blood sugars going too low is increased. Glyburide is not recommended to be used for people that are >60 years old.    I spent 30 minutes with this patient today.  All changes were made via collaborative practice agreement with Stefan Trejo. A copy of the visit note was provided to the patient's primary care provider.     Will follow up in 6 weeks.    The patient was given a summary of these recommendations as an after visit summary.    Patricia Osorio, PharmD  Medication Therapy Management

## 2017-02-22 ASSESSMENT — PATIENT HEALTH QUESTIONNAIRE - PHQ9: SUM OF ALL RESPONSES TO PHQ QUESTIONS 1-9: 6

## 2017-03-14 ENCOUNTER — CARE COORDINATION (OUTPATIENT)
Dept: CARE COORDINATION | Facility: CLINIC | Age: 82
End: 2017-03-14

## 2017-03-14 DIAGNOSIS — I10 ESSENTIAL HYPERTENSION, BENIGN: ICD-10-CM

## 2017-03-14 NOTE — PROGRESS NOTES
Clinic Care Coordination Contact  [facility]/Voicemail    Referral Source: Self-patient/Caregiver  Clinical Data: Care Coordinator Outreach  Outreach attempted x 1 to f/u with daughter, Roseann on status update.  Left message on voicemail with call back information and requested return call.  Plan: Care Coordinator will try to reach patient again in 4-6 weeks.  Roseann Cheung, RN-BSN  RN Clinic Care Coordinator  [facility]  [phone]

## 2017-03-14 NOTE — TELEPHONE ENCOUNTER
Toprol      Last Written Prescription Date: 05/10/2016  Last Fill Quantity: 135, # refills: 5  Last Office Visit with G, P or [facility] prescribing provider: 02/03/2017  Next 5 appointments (look out 90 days)     Apr 04, 2017  2:30 PM CDT   SHORT with Patricia Osorio [facility] ([facility])    [address] [id number] [phone]                   Potassium   Date Value Ref Range Status   11/14/2016 4.4 3.4 - 5.3 mmol/L Final     Creatinine   Date Value Ref Range Status   11/14/2016 1.79 (H) 0.66 - 1.25 mg/dL Final     BP Readings from Last 3 Encounters:   02/21/17 119/65   02/03/17 115/59   12/05/16 111/54     Geovany HENRIQUEZ (R)

## 2017-03-15 ENCOUNTER — TELEPHONE (OUTPATIENT)
Dept: FAMILY MEDICINE | Facility: CLINIC | Age: 82
End: 2017-03-15

## 2017-03-16 ENCOUNTER — MEDICAL CORRESPONDENCE (OUTPATIENT)
Dept: HEALTH INFORMATION MANAGEMENT | Facility: CLINIC | Age: 82
End: 2017-03-16

## 2017-03-20 RX ORDER — METOPROLOL SUCCINATE 50 MG/1
TABLET, EXTENDED RELEASE ORAL
Qty: 135 TABLET | Refills: 5 | Status: SHIPPED | OUTPATIENT
Start: 2017-03-20

## 2017-03-20 NOTE — TELEPHONE ENCOUNTER
Routing refill request to provider for review/approval because:  Labs out of range:  Cr     Thank you    Ellie FERNANDEZ RN

## 2017-03-22 ENCOUNTER — TELEPHONE (OUTPATIENT)
Dept: FAMILY MEDICINE | Facility: CLINIC | Age: 82
End: 2017-03-22

## 2017-03-23 ENCOUNTER — CARE COORDINATION (OUTPATIENT)
Dept: CARE COORDINATION | Facility: CLINIC | Age: 82
End: 2017-03-23

## 2017-03-23 NOTE — PROGRESS NOTES
Clinic Care Coordination Contact  Care Team Conversations    Clinical Data: RN CC called to f/u with pt's daughter, Roseann. Pt and spouse will be moving into [facility] in [facility] on 4/10/17. Family is very happy of the pt/spouse's decision to move into an correction facility. Pt plans to keep current PCP. Family will assist with transportation to [facility].     Plan: At this time, there are no further questions or concerns. Will close to active CC outreaches. Family aware they can contact RN CC in the future if needed.      Roseann Cheung, RN-BSN  RN Clinic Care Coordinator  [facility]  [phone]

## 2017-03-23 NOTE — TELEPHONE ENCOUNTER
[facility] medication order form received and routed to Dr. Trejo for review and signature.     Also a request from [facility] for POLST and updated notes regarding patient's testing.    Form is first routed to RN to assist in locating POLST for patient.

## 2017-03-27 NOTE — TELEPHONE ENCOUNTER
Charity calling from [facility] needing paperwork re faxed.    Gladis [facility]

## 2017-03-28 ENCOUNTER — OFFICE VISIT (OUTPATIENT)
Dept: FAMILY MEDICINE | Facility: CLINIC | Age: 82
End: 2017-03-28
Payer: COMMERCIAL

## 2017-03-28 VITALS
TEMPERATURE: 98.9 F | WEIGHT: 198 LBS | SYSTOLIC BLOOD PRESSURE: 107 MMHG | DIASTOLIC BLOOD PRESSURE: 63 MMHG | HEART RATE: 65 BPM | HEIGHT: 66 IN | BODY MASS INDEX: 31.82 KG/M2

## 2017-03-28 DIAGNOSIS — E11.8 CONTROLLED TYPE 2 DIABETES MELLITUS WITH COMPLICATION, WITHOUT LONG-TERM CURRENT USE OF INSULIN (H): ICD-10-CM

## 2017-03-28 DIAGNOSIS — E11.29 CONTROLLED TYPE 2 DIABETES MELLITUS WITH OTHER DIABETIC KIDNEY COMPLICATION, WITHOUT LONG-TERM CURRENT USE OF INSULIN (H): ICD-10-CM

## 2017-03-28 DIAGNOSIS — M54.2 CERVICALGIA: Primary | ICD-10-CM

## 2017-03-28 LAB — HBA1C MFR BLD: 5.8 % (ref 4.3–6)

## 2017-03-28 PROCEDURE — 83036 HEMOGLOBIN GLYCOSYLATED A1C: CPT | Performed by: FAMILY MEDICINE

## 2017-03-28 PROCEDURE — 99214 OFFICE O/P EST MOD 30 MIN: CPT | Performed by: FAMILY MEDICINE

## 2017-03-28 PROCEDURE — 36415 COLL VENOUS BLD VENIPUNCTURE: CPT | Performed by: FAMILY MEDICINE

## 2017-03-28 RX ORDER — TRAMADOL HYDROCHLORIDE 50 MG/1
50 TABLET ORAL 2 TIMES DAILY PRN
COMMUNITY
Start: 2016-12-05

## 2017-03-28 RX ORDER — ACETAMINOPHEN 325 MG/1
325-650 TABLET ORAL EVERY 6 HOURS PRN
Qty: 100 TABLET | Refills: 0 | COMMUNITY
Start: 2017-03-28

## 2017-03-28 NOTE — LETTER
[facility]  [address] [address]  Phone: [phone]    March 29, 2017    Derrick Silva  [address] [place] [address]              Dear Mr. Silva,      The results of your recent lab tests have been reviewed.    Your hemoglobin A1c is within normal range, and he should continue all medications unchanged.   Repeat A1c in 6 months.         Enclosed is a copy of these results.  If you have any further questions or problems, please contact our office.          Sincerely,      Stefan Trejo MD / ac      Component      Latest Ref Rng & Units 3/28/2017   Hemoglobin A1C      4.3 - 6.0 % 5.8

## 2017-03-28 NOTE — PROGRESS NOTES
SUBJECTIVE:                                                    Derrick Silva is a 85 year old male who presents to clinic today for the following health issues:  Chief Complaint   Patient presents with     Musculoskeletal Problem     Pt has had some neck pain for the past month.     Diabetes     Pt would also like to discuss issues with how many times he checks his blood sugars.  Is moving into senior apartments and they are telling him he has to check his blood sugars daily which would raise his price he has to pay to live there.       Neck Pain     Onset: 1 month    Description:   Location: right side of neck  Radiation: to side of face  Patient states this started when he got stressed about selling his house, helping his wife more with her daily needs (dressing, bathing, cooking, transfer from bed/chairs)    Intensity: 2-8/10    Progression of Symptoms:  same and little better today, worse at other times    Accompanying Signs & Symptoms:  Burning, prickly sensation (paresthesias) in arm(s): no   Numbness in arm(s): no   Weakness in arm(s):  no   Fever: no   Headache: no   Nausea and/or vomiting: YES- sometimes feels something coming up and has to swallow to get it back down or drink water, happens when he is laying down   History:   Trauma: YES- pt has been packing to move  Previous neck pain: no   Previous surgery or injections: no   Previous Imaging (MRI,X ray): no     Precipitating factors:   Does movement increase the pain:  YES    Alleviating factors:  none     Therapies Tried and outcome:  Ibuprofen helps  Verified above history with patient.      Diabetes:  Last A1c was 5.8.  It has been stable for a while now.  Patient denies hypoglycemic or hyperglycemia symptoms.  Patient currently tests glucose at home once a week, twice at the most.    Problem list and histories reviewed & adjusted, as indicated.  Additional history: as documented    Patient Active Problem List   Diagnosis      Osteoarthritis     Benign essential hypertension     Prostate cancer (H)     Adenomatous polyps     Onychia of toe     GERD (gastroesophageal reflux disease)     Arteriosclerotic heart disease     BPH (benign prostatic hyperplasia)     Hyperlipidemia LDL goal <100     Advance Care Planning     Aortic stenosis     Type 2 diabetes mellitus, controlled, with renal complications (H)     Diabetes mellitus type 2, controlled, with complications (H)     Obesity     Past Surgical History:   Procedure Laterality Date     ANGIOGRAM  2010     CARPAL TUNNEL RELEASE RT/LT  2005     COLONOSCOPY  2008     ESOPHAGOSCOPY, GASTROSCOPY, DUODENOSCOPY (EGD), COMBINED  10/17/2011    Procedure:COMBINED ESOPHAGOSCOPY, GASTROSCOPY, DUODENOSCOPY (EGD), BIOPSY SINGLE OR MULTIPLE; Surgeon:TOÑO TAYLOR; Location:[facility]     HEMORRHOIDECTOMY       IMPLANT PACEMAKER       RELEASE CARPAL TUNNEL  8/15/2014    Procedure: RELEASE CARPAL TUNNEL;  Surgeon: Thom Hill MD;  Location: [facility]       Social History   Substance Use Topics     Smoking status: Never Smoker     Smokeless tobacco: Never Used     Alcohol use Yes      Comment: rare     Family History   Problem Relation Age of Onset     HEART DISEASE Sister      HEART DISEASE Brother      HEART DISEASE Brother      Alcohol/Drug Brother      HEART DISEASE Sister      Neurologic Disorder Daughter      parkinson's         Current Outpatient Prescriptions   Medication Sig Dispense Refill     menthol, Topical Analgesic, 2.5% (BENGAY VANISHIN SCENT) 2.5 % GEL topical gel Apply to neck twice daily as needed for pain.       traMADol (ULTRAM) 50 MG tablet Take 50 mg by mouth 2 times daily as needed       acetaminophen (TYLENOL) 325 MG tablet Take 1-2 tablets (325-650 mg) by mouth every 6 hours as needed for mild pain 100 tablet 0     metoprolol (TOPROL-XL) 50 MG 24 hr tablet Take 1 in AM (50 mg) and 1/2 in PM (25 mg) 135 tablet 5     glipiZIDE (GLIPIZIDE XL) 5 MG 24 hr tablet Take 1 tablet (5 mg)  by mouth daily 90 tablet 1     famotidine (PEPCID) 20 MG tablet Take 20 mg by mouth as needed       metFORMIN (GLUCOPHAGE) 1000 MG tablet Take 1 tablet (1,000 mg) by mouth 2 times daily (with meals) 180 tablet 1     tamsulosin (FLOMAX) 0.4 MG 24 hr capsule Take 1 capsule (0.4 mg) by mouth daily 90 capsule 3     simvastatin (ZOCOR) 40 MG tablet Take 0.5 tablets (20 mg) by mouth At Bedtime 90 tablet 3     amLODIPine (NORVASC) 10 MG tablet Take 1 tablet (10 mg) by mouth daily 90 tablet 3     sertraline (ZOLOFT) 50 MG tablet 1 tablet orally daily 90 tablet 3     losartan (COZAAR) 50 MG tablet Take 1 tablet (50 mg) by mouth daily 90 tablet 3     aspirin 81 MG tablet Take 1 tablet (81 mg) by mouth daily 30 tablet      docusate sodium (COLACE) 100 MG capsule Take 100 mg by mouth daily        nitroglycerin (NITROSTAT) 0.4 MG sublingual tablet Place 1 tablet (0.4 mg) under the tongue every 5 minutes as needed 60 tablet 0     acetaminophen 500 MG CAPS Take 1 tablet by mouth At Bedtime       blood glucose monitoring (ACCU-CHEK GEOFFREY) test strip Use to test blood sugars 1 times daily or as directed. 100 each 2     ORDER FOR DME Equipment being ordered: Thigh high compression stockings 1 Units 1     Allergies   Allergen Reactions     Nkda [No Known Drug Allergies]        Reviewed and updated as needed this visit by clinical staff  Tobacco  Allergies  Meds  Problems  Med Hx  Surg Hx  Fam Hx  Soc Hx        Reviewed and updated as needed this visit by Provider  Allergies  Meds  Problems         ROS:  C: NEGATIVE for fever, chills, change in weight  I: NEGATIVE for worrisome rashes, moles or lesions  E: NEGATIVE for vision changes or irritation  E/M: NEGATIVE for ear, mouth and throat problems  R: NEGATIVE for significant cough or SOB  CV: NEGATIVE for chest pain, palpitations or peripheral edema  GI: NEGATIVE for nausea, abdominal pain, heartburn, or change in bowel habits  : NEGATIVE for frequency, dysuria, or  "hematuria  MUSCULOSKELETAL: see above  N: NEGATIVE for weakness, dizziness or paresthesias  E: NEGATIVE for temperature intolerance, skin/hair changes  H: NEGATIVE for bleeding problems  P: NEGATIVE for changes in mood or affect    OBJECTIVE:                                                    /63  Pulse 65  Temp 98.9  F (37.2  C) (Tympanic)  Ht 5' 6\" (1.676 m)  Wt 198 lb (89.8 kg)  BMI 31.96 kg/m2  Body mass index is 31.96 kg/(m^2).  GENERAL:  alert and no distress, ambulatory with a cane.  NECK: supple, full range of motion but with pain on right neck base on lateral rotation to left  MS: extremities- no gross deformities noted, no edema  SKIN: no suspicious lesions, no rashes  NEURO: strength and tone- normal, sensory exam- grossly normal, reflexes- symmetric  BACK: normal curvature, no deformity, no skin changes, no tendernes on palpation, range of motion full, SLR negative bilaterally    Diagnostic test results:  Diagnostic Test Results:  Results for orders placed or performed in visit on 03/28/17   Hemoglobin A1c   Result Value Ref Range    Hemoglobin A1C 5.8 4.3 - 6.0 %        ASSESSMENT/PLAN:                                                        ICD-10-CM    1. Cervicalgia M54.2 menthol, Topical Analgesic, 2.5% (BENGAY VANISHIN SCENT) 2.5 % GEL topical gel     acetaminophen (TYLENOL) 325 MG tablet   2. Controlled type 2 diabetes mellitus with complication, without long-term current use of insulin (H) E11.8 HEMOGLOBIN A1C  Advised patient again of normal A1c in September 2016.  He may check glucose once a week, prebreakfast.  Carb controlled diet.  Foot care.     3. 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         Follow up with Provider - 6months or prn   Patient Instructions   You have mild strain of the neck muscles on the right.  Acetaminophen 325 mg orally 1-2 " tabs every 6 hrs as needed for pain.  Fore more severe pain only, you may take Tramadol 50 mg 1 tablet at least 12 hrs apart between doses.  Warm compress to neck 10 minutes at a time, 2-3 x a day.  Bengay ointment to the neck when pain is present.  If with severe pain not responsive to the above, arm weakness, stiff neck or headache, see provider promptly.    [facility] to [facility] now for your blood draw.    Blood sugar measurements at home: measure before breakfast once a week is sufficient.    If blood sugar is more than 200, may check it before breakfast and 2 hrs after supper for 2-3 days. Report to provider if measurements are consistently above 200.        Stefan Trejo MD  [facility]

## 2017-03-28 NOTE — PATIENT INSTRUCTIONS
You have mild strain of the neck muscles on the right.  Acetaminophen 325 mg orally 1-2 tabs every 6 hrs as needed for pain.  Fore more severe pain only, you may take Tramadol 50 mg 1 tablet at least 12 hrs apart between doses.  Warm compress to neck 10 minutes at a time, 2-3 x a day.  Bengay ointment to the neck when pain is present.  If with severe pain not responsive to the above, arm weakness, stiff neck or headache, see provider promptly.    Go to [facility] now for your blood draw.    Blood sugar measurements at home: measure before breakfast once a week is sufficient.    If blood sugar is more than 200, may check it before breakfast and 2 hrs after supper for 2-3 days. Report to provider if measurements are consistently above 200.

## 2017-03-28 NOTE — MR AVS SNAPSHOT
After Visit Summary   3/28/2017    Derrick Silva    MRN: [id number]           Patient Information     Date Of Birth          3/7/1932        Visit Information        Provider Department      3/28/2017 9:00 AM Stefan Trejo MD [facility]        Today's Diagnoses     Cervicalgia    -  1    Controlled type 2 diabetes mellitus with complication, without long-term current use of insulin (H)          Care Instructions    You have mild strain of the neck muscles on the right.  Acetaminophen 325 mg orally 1-2 tabs every 6 hrs as needed for pain.  Fore more severe pain only, you may take Tramadol 50 mg 1 tablet at least 12 hrs apart between doses.  Warm compress to neck 10 minutes at a time, 2-3 x a day.  Bengay ointment to the neck when pain is present.  If with severe pain not responsive to the above, arm weakness, stiff neck or headache, see provider promptly.    Go to [facility] now for your blood draw.    Blood sugar measurements at home: measure before breakfast once a week is sufficient.    If blood sugar is more than 200, may check it before breakfast and 2 hrs after supper for 2-3 days. Report to provider if measurements are consistently above 200.          Follow-ups after your visit        Follow-up notes from your care team     Return if symptoms worsen or fail to improve.      Your next 10 appointments already scheduled     Apr 04, 2017  2:30 PM CDT   SHORT with Patricia Osorio, [facility] ([facility])    [address] [address]   [phone]            May 01, 2017  1:00 PM CDT   Remote PPM Check with [facility]   [facility] ([facility])    [address] [address]   [phone]           This appointment is for a remote check of your pacemaker.  This is not an appointment at the office.              Who to contact     If you have  "questions or need follow up information about today's clinic visit or your schedule please contact [facility] directly at [phone].  Normal or non-critical lab and imaging results will be communicated to you by MyChart, letter or phone within 4 business days after the clinic has received the results. If you do not hear from us within 7 days, please contact the clinic through [employer] or phone. If you have a critical or abnormal lab result, we will notify you by phone as soon as possible.  Submit refill requests through [employer] or call your pharmacy and they will forward the refill request to us. Please allow 3 business days for your refill to be completed.          Additional Information About Your Visit        [employer] Information     [employer] lets you send messages to your doctor, view your test results, renew your prescriptions, schedule appointments and more. To sign up, go to [email or web address] . Click on \"Log in\" on the left side of the screen, which will take you to the Welcome page. Then click on \"Sign up Now\" on the right side of the page.     You will be asked to enter the access code listed below, as well as some personal information. Please follow the directions to create your username and password.     Your access code is: [email or web address]  Expires: 2017  4:28 PM     Your access code will  in 90 days. If you need help or a new code, please call your [place] clinic or [phone].        Care EveryWhere ID     This is your Care EveryWhere ID. This could be used by other organizations to access your [place] medical records  [id number]        Your Vitals Were     Pulse Temperature Height BMI (Body Mass Index)          65 98.9  F (37.2  C) (Tympanic) 5' 6\" (1.676 m) 31.96 kg/m2         Blood Pressure from Last 3 Encounters:   17 107/63   17 119/65   17 115/59    Weight from Last 3 Encounters:   17 198 lb (89.8 kg)   17 197 lb (89.4 kg)   16 " 201 lb (91.2 kg)              Today, you had the following     No orders found for display       Primary Care Provider Office Phone # Fax #    Stefan Trejo [phone]       [facility]        [address] [address]        Thank you!     Thank you for choosing [facility]  for your care. Our goal is always to provide you with excellent care. Hearing back from our patients is one way we can continue to improve our services. Please take a few minutes to complete the written survey that you may receive in the mail after your visit with us. Thank you!             Your Updated Medication List - Protect others around you: Learn how to safely use, store and throw away your medicines at www.disposemymeds.org.          This list is accurate as of: 3/28/17 10:01 AM.  Always use your most recent med list.                   Brand Name Dispense Instructions for use    * acetaminophen 500 MG Caps      Take 1 tablet by mouth At Bedtime       * acetaminophen 325 MG tablet    TYLENOL    100 tablet    Take 1-2 tablets (325-650 mg) by mouth every 6 hours as needed for mild pain       amLODIPine 10 MG tablet    NORVASC    90 tablet    Take 1 tablet (10 mg) by mouth daily       aspirin 81 MG tablet     30 tablet    Take 1 tablet (81 mg) by mouth daily       blood glucose monitoring test strip    ACCU-CHEK GEOFFREY    100 each    Use to test blood sugars 1 times daily or as directed.       docusate sodium 100 MG capsule    COLACE     Take 100 mg by mouth daily       glipiZIDE 5 MG 24 hr tablet    glipiZIDE XL    90 tablet    Take 1 tablet (5 mg) by mouth daily       losartan 50 MG tablet    COZAAR    90 tablet    Take 1 tablet (50 mg) by mouth daily       menthol (Topical Analgesic) 2.5% 2.5 % Gel topical gel    BENGAY VANISHIN SCENT     Apply to neck twice daily as needed for pain.       metFORMIN 1000 MG tablet    GLUCOPHAGE    180 tablet    Take 1 tablet  (1,000 mg) by mouth 2 times daily (with meals)       metoprolol 50 MG 24 hr tablet    TOPROL-XL    135 tablet    Take 1 in AM (50 mg) and 1/2 in PM (25 mg)       nitroglycerin 0.4 MG sublingual tablet    NITROSTAT    60 tablet    Place 1 tablet (0.4 mg) under the tongue every 5 minutes as needed       order for DME     1 Units    Equipment being ordered: Thigh high compression stockings       PEPCID 20 MG tablet   Generic drug:  famotidine      Take 20 mg by mouth as needed       sertraline 50 MG tablet    ZOLOFT    90 tablet    1 tablet orally daily       simvastatin 40 MG tablet    ZOCOR    90 tablet    Take 0.5 tablets (20 mg) by mouth At Bedtime       tamsulosin 0.4 MG capsule    FLOMAX    90 capsule    Take 1 capsule (0.4 mg) by mouth daily       traMADol 50 MG tablet    ULTRAM     Take 50 mg by mouth 2 times daily as needed       * Notice:  This list has 2 medication(s) that are the same as other medications prescribed for you. Read the directions carefully, and ask your doctor or other care provider to review them with you.

## 2017-03-28 NOTE — NURSING NOTE
"Chief Complaint   Patient presents with     Musculoskeletal Problem     Pt has had some neck pain for the past month.     Diabetes     Pt would also like to discuss issues with how many times he checks his blood sugars.  Is moving into senior apartments and they are telling him he has to check his blood sugars daily which would raise his price he has to pay to live there.       Initial /63  Pulse 65  Temp 98.9  F (37.2  C) (Tympanic)  Ht 5' 6\" (1.676 m)  Wt 198 lb (89.8 kg)  BMI 31.96 kg/m2 Estimated body mass index is 31.96 kg/(m^2) as calculated from the following:    Height as of this encounter: 5' 6\" (1.676 m).    Weight as of this encounter: 198 lb (89.8 kg).  Medication Reconciliation: complete  Shikha Caldwell CMA    "

## 2017-03-30 ENCOUNTER — TELEPHONE (OUTPATIENT)
Dept: FAMILY MEDICINE | Facility: CLINIC | Age: 82
End: 2017-03-30

## 2017-03-30 NOTE — TELEPHONE ENCOUNTER
Reason for Call:  Other orders     Detailed comments: there will be forms coming from [facility]   They are needing orders signed and POLTS done for the pt to change level of care   Waiting on forms to be faxed so they can be given to PCP     Call taken on 3/30/2017 at 12:30 PM by Meredith Obando

## 2017-04-04 ENCOUNTER — ALLIED HEALTH/NURSE VISIT (OUTPATIENT)
Dept: PHARMACY | Facility: CLINIC | Age: 82
End: 2017-04-04
Payer: COMMERCIAL

## 2017-04-04 DIAGNOSIS — E11.22 CONTROLLED TYPE 2 DIABETES MELLITUS WITH STAGE 3 CHRONIC KIDNEY DISEASE, WITHOUT LONG-TERM CURRENT USE OF INSULIN (H): Primary | ICD-10-CM

## 2017-04-04 DIAGNOSIS — N18.30 CONTROLLED TYPE 2 DIABETES MELLITUS WITH STAGE 3 CHRONIC KIDNEY DISEASE, WITHOUT LONG-TERM CURRENT USE OF INSULIN (H): Primary | ICD-10-CM

## 2017-04-04 PROCEDURE — 99606 MTMS BY PHARM EST 15 MIN: CPT | Mod: U4 | Performed by: PHARMACIST

## 2017-04-04 NOTE — TELEPHONE ENCOUNTER
Message on forms from PCP: I have not discussed this personally with pt   So unable to attend to what his wishes are.  Forms with message faxed back to [phone]  Meredith Mcbride  [facility]

## 2017-04-04 NOTE — MR AVS SNAPSHOT
After Visit Summary   4/4/2017    Derrick Silva    MRN: [id number]           Patient Information     Date Of Birth          3/7/1932        Visit Information        Provider Department      4/4/2017 2:30 PM Patricia Osorio, [facility]        Today's Diagnoses     Controlled type 2 diabetes mellitus with stage 3 chronic kidney disease, without long-term current use of insulin (H)    -  1      Care Instructions    1. Continue current drug therapy.  2. Continue to check your blood sugars before meals and at bedtime.     I will call you to follow up in 1 month.     Patricia Osorio, PharmD  [phone] in clinic on Tuesday and Wednesday          Follow-ups after your visit        Your next 10 appointments already scheduled     May 01, 2017  1:00 PM CDT   Remote PPM Check with [facility]   [facility] ([facility])    [address] [address] [phone]           This appointment is for a remote check of your pacemaker.  This is not an appointment at the office.            May 02, 2017 10:00 AM CDT   TELEMEDICINE with Patricia Osorio [facility] ([facility])    [address] [address] [phone]           Note: this is not an onsite visit; there is no need to come to the facility.              Who to contact     If you have questions or need follow up information about today's clinic visit or your schedule please contact [facility] directly at [phone].  Normal or non-critical lab and imaging results will be communicated to you by MyChart, letter or phone within 4 business days after the clinic has received the results. If you do not hear from us within 7 days, please contact the clinic through MyChart or phone. If you have a critical or abnormal lab result, we will notify you by phone as soon as  "possible.  Submit refill requests through [employer] or call your pharmacy and they will forward the refill request to us. Please allow 3 business days for your refill to be completed.          Additional Information About Your Visit        [employer] Information     [employer] lets you send messages to your doctor, view your test results, renew your prescriptions, schedule appointments and more. To sign up, go to [email or web address] . Click on \"Log in\" on the left side of the screen, which will take you to the Welcome page. Then click on \"Sign up Now\" on the right side of the page.     You will be asked to enter the access code listed below, as well as some personal information. Please follow the directions to create your username and password.     Your access code is: [email or web address]  Expires: 2017  4:28 PM     Your access code will  in 90 days. If you need help or a new code, please call your [place] clinic or [phone].        Care EveryWhere ID     This is your Care EveryWhere ID. This could be used by other organizations to access your [place] medical records  [id number]         Blood Pressure from Last 3 Encounters:   17 107/63   17 119/65   17 115/59    Weight from Last 3 Encounters:   17 198 lb (89.8 kg)   17 197 lb (89.4 kg)   16 201 lb (91.2 kg)              Today, you had the following     No orders found for display       Primary Care Provider Office Phone # Fax #    Stefan Trejo [phone]       [facility]        [address] [address]        Thank you!     Thank you for choosing [facility]  for your care. Our goal is always to provide you with excellent care. Hearing back from our patients is one way we can continue to improve our services. Please take a few minutes to complete the written survey that you may receive in the mail after your visit with us. Thank " you!             Your Updated Medication List - Protect others around you: Learn how to safely use, store and throw away your medicines at www.disposemymeds.org.          This list is accurate as of: 4/4/17  3:59 PM.  Always use your most recent med list.                   Brand Name Dispense Instructions for use    * acetaminophen 500 MG Caps      Take 1 tablet by mouth At Bedtime       * acetaminophen 325 MG tablet    TYLENOL    100 tablet    Take 1-2 tablets (325-650 mg) by mouth every 6 hours as needed for mild pain       amLODIPine 10 MG tablet    NORVASC    90 tablet    Take 1 tablet (10 mg) by mouth daily       aspirin 81 MG tablet     30 tablet    Take 1 tablet (81 mg) by mouth daily       blood glucose monitoring test strip    ACCU-CHEK GEOFFREY    100 each    Use to test blood sugars 1 times daily or as directed.       docusate sodium 100 MG capsule    COLACE     Take 100 mg by mouth daily       glipiZIDE 5 MG 24 hr tablet    glipiZIDE XL    90 tablet    Take 1 tablet (5 mg) by mouth daily       losartan 50 MG tablet    COZAAR    90 tablet    Take 1 tablet (50 mg) by mouth daily       menthol (Topical Analgesic) 2.5% 2.5 % Gel topical gel    BENGAY VANISHIN SCENT     Apply to neck twice daily as needed for pain.       metFORMIN 1000 MG tablet    GLUCOPHAGE    180 tablet    Take 1 tablet (1,000 mg) by mouth 2 times daily (with meals)       metoprolol 50 MG 24 hr tablet    TOPROL-XL    135 tablet    Take 1 in AM (50 mg) and 1/2 in PM (25 mg)       nitroglycerin 0.4 MG sublingual tablet    NITROSTAT    60 tablet    Place 1 tablet (0.4 mg) under the tongue every 5 minutes as needed       order for DME     1 Units    Equipment being ordered: Thigh high compression stockings       PEPCID 20 MG tablet   Generic drug:  famotidine      Take 20 mg by mouth as needed       sertraline 50 MG tablet    ZOLOFT    90 tablet    1 tablet orally daily       simvastatin 40 MG tablet    ZOCOR    90 tablet    Take 0.5 tablets (20  mg) by mouth At Bedtime       tamsulosin 0.4 MG capsule    FLOMAX    90 capsule    Take 1 capsule (0.4 mg) by mouth daily       traMADol 50 MG tablet    ULTRAM     Take 50 mg by mouth 2 times daily as needed       * Notice:  This list has 2 medication(s) that are the same as other medications prescribed for you. Read the directions carefully, and ask your doctor or other care provider to review them with you.

## 2017-04-04 NOTE — PROGRESS NOTES
SUBJECTIVE/OBJECTIVE:                                                    Derrick Silva is a 85 year old male called for a follow-up visit for Medication Therapy Management.  He was referred to me from [facility].     Chief Complaint: none. Follow up visit from 1/24/2017    Allergies/ADRs: None  Tobacco: No tobacco use   Alcohol: not currently using    PMH: Reviewed in Epic    Medication Adherence: no issues reported by patient.     Diabetes:  Pt currently taking glipizide XL 5 mg daily and metformin 1000 mg twice daily with meals, Pt is not experiencing side effects.  SMBG: one time every few days. Ranges (patient reported): yesterday morning his blood sugar was 108. His blood sugars are not going too low.    Frequency of hypoglycemia? never.  Eye exam: up to date  Foot exam: up to date  Microalbumin is not < 30 mg/g. Pt is taking an ACEi/ARB.  Aspirin: Taking 81mg daily and denies side effects  Patient gets a yearly flu vaccine and has had both pneumonia vaccines.     Current labs include:  BP Readings from Last 3 Encounters:   03/28/17 107/63   02/21/17 119/65   02/03/17 115/59     Today's Vitals: There were no vitals taken for this visit.  Lab Results   Component Value Date    A1C 5.8 03/28/2017   .  Lab Results   Component Value Date    CHOL 158 09/19/2016     Lab Results   Component Value Date    TRIG 178 09/19/2016     Lab Results   Component Value Date    HDL 37 09/19/2016     Lab Results   Component Value Date    LDL 85 09/19/2016       Liver Function Studies -   Recent Labs   Lab Test  11/10/16   1101   PROTTOTAL  7.3   ALBUMIN  3.8   BILITOTAL  0.3   ALKPHOS  54   AST  20   ALT  37       Lab Results   Component Value Date    UCRR 86 09/19/2016    MICROL 31 09/19/2016    UMALCR 35.86 (H) 09/19/2016       Last Basic Metabolic Panel:  Lab Results   Component Value Date     11/14/2016      Lab Results   Component Value Date    POTASSIUM 4.4 11/14/2016     Lab Results   Component Value Date     CHLORIDE 107 11/14/2016     Lab Results   Component Value Date    BUN 33 11/14/2016     Lab Results   Component Value Date    CR 1.79 11/14/2016     GFR Estimate   Date Value Ref Range Status   11/14/2016 36 (L) >60 mL/min/1.7m2 Final     Comment:     Non  GFR Calc   11/10/2016 38 (L) >60 mL/min/1.7m2 Final     Comment:     Non  GFR Calc   09/19/2016 40 (L) >60 mL/min/1.7m2 Final     Comment:     Non  GFR Calc     GFR Estimate If Black   Date Value Ref Range Status   11/14/2016 44 (L) >60 mL/min/1.7m2 Final     Comment:      GFR Calc   11/10/2016 45 (L) >60 mL/min/1.7m2 Final     Comment:      GFR Calc   09/19/2016 48 (L) >60 mL/min/1.7m2 Final     Comment:      GFR Calc     TSH   Date Value Ref Range Status   09/09/2015 1.14 0.40 - 4.00 mU/L Final   ]    Most Recent Immunizations   Administered Date(s) Administered     Influenza (High Dose) 3 valent vaccine 10/03/2016     Influenza (IIV3) 11/22/2011     Influenza Vaccine IM 3yrs+ 4 Valent IIV4 12/27/2013     Pneumococcal (PCV 13) 10/03/2016     Pneumococcal 23 valent 10/06/2000     TD (ADULT, 7+) 10/06/2000     TDAP Vaccine (Adacel) 01/15/2014     Varicella 01/03/2002     ASSESSMENT:                                                    Current medications were reviewed today as discussed above.      Medication Adherence: no issues identified    Diabetes: Stable. Patient is meeting A1c goal of < 7%. Blood sugars are stable and at goal on glipizide.      PLAN:                                                      1. Continue current drug therapy.  2. Continue to check your blood sugars before meals and at bedtime.     I spent 15 minutes with this patient today.  All changes were made via collaborative practice agreement with Stefan Trejo. A copy of the visit note was provided to the patient's primary care provider.     Will follow up in 1 month.    The patient  declined a summary of these recommendations as an after visit summary.    Patricia Osorio, PharmD  Medication Therapy Management

## 2017-04-04 NOTE — PATIENT INSTRUCTIONS
1. Continue current drug therapy.  2. Continue to check your blood sugars before meals and at bedtime.     I will call you to follow up in 1 month.     Patricia Osorio, PharmD  [phone] in clinic on Tuesday and Wednesday

## 2017-04-10 ENCOUNTER — OFFICE VISIT (OUTPATIENT)
Dept: FAMILY MEDICINE | Facility: CLINIC | Age: 82
End: 2017-04-10
Payer: COMMERCIAL

## 2017-04-10 VITALS
HEART RATE: 74 BPM | BODY MASS INDEX: 31.85 KG/M2 | TEMPERATURE: 97.9 F | SYSTOLIC BLOOD PRESSURE: 122 MMHG | HEIGHT: 66 IN | WEIGHT: 198.2 LBS | DIASTOLIC BLOOD PRESSURE: 59 MMHG

## 2017-04-10 DIAGNOSIS — Z71.89 ADVANCED DIRECTIVES, COUNSELING/DISCUSSION: ICD-10-CM

## 2017-04-10 DIAGNOSIS — E11.29 CONTROLLED TYPE 2 DIABETES MELLITUS WITH OTHER DIABETIC KIDNEY COMPLICATION, WITHOUT LONG-TERM CURRENT USE OF INSULIN (H): Primary | ICD-10-CM

## 2017-04-10 PROCEDURE — 99213 OFFICE O/P EST LOW 20 MIN: CPT | Performed by: FAMILY MEDICINE

## 2017-04-10 NOTE — LETTER
[facility]  [address] [place]  [address]  Phone: [phone]  Fax: [phone]      April 10, 2017      Re: Derrick Silva ( 3/7/1932)      To whom it may concern:    Please allow Mr. Silva to check his own blood sugars once a week. If his blood sugars are more than 200, he may check his blood sugars on his own every day for 3-5 days and report to his skilled nursing staff.    Yours truly,        Stefan Trejo M.D.

## 2017-04-10 NOTE — PATIENT INSTRUCTIONS
You may measure your own blood sugar once a week    POLST form has been filled out.    The above orders/form will be faxed to your assisted living office as requested.

## 2017-04-10 NOTE — NURSING NOTE
"Chief Complaint   Patient presents with     Forms     Pt here to have paperwork filled out for assisted living home.  Need polst filled out and paperwork stating that he may continue to monitor his own blood sugar.       Initial /59  Pulse 74  Temp 97.9  F (36.6  C) (Tympanic)  Ht 5' 6\" (1.676 m)  Wt 198 lb 3.2 oz (89.9 kg)  BMI 31.99 kg/m2 Estimated body mass index is 31.99 kg/(m^2) as calculated from the following:    Height as of this encounter: 5' 6\" (1.676 m).    Weight as of this encounter: 198 lb 3.2 oz (89.9 kg).  Medication Reconciliation: complete  Shikha Caldwell CMA    "

## 2017-04-10 NOTE — PROGRESS NOTES
SUBJECTIVE:                                                    Derrick Silva is a 85 year old male who presents to clinic today for the following health issues:  Chief Complaint   Patient presents with     Forms     Pt here to have paperwork filled out for assisted living home.  Need polst filled out and paperwork stating that he may continue to monitor his own blood sugar.       Patient has been measuring his glucose once a week for a long time now as he has had stable controlled Type 2 diabetes.  He is moving into an assisted living home and needs provider instruction/order to have himself measure it as previous. Last A1c within normal range. Patient denies polyuria, polydipsia, polyphagia, dizziness, LOC, HA, BOV, numbness or tingling in extremities    Patient also needs POLST form filled out.  He wishes DNR, no heroic measures to resuscitate or sustain life, okay to give only oral antibiotics or IV fluids if necessary, but no IV antibiotics or medications.        Problem list and histories reviewed & adjusted, as indicated.  Additional history: as documented    Patient Active Problem List   Diagnosis     Osteoarthritis     Benign essential hypertension     Prostate cancer (H)     Adenomatous polyps     Onychia of toe     GERD (gastroesophageal reflux disease)     Arteriosclerotic heart disease     BPH (benign prostatic hyperplasia)     Hyperlipidemia LDL goal <100     Advance Care Planning     Aortic stenosis     Type 2 diabetes mellitus, controlled, with renal complications (H)     Diabetes mellitus type 2, controlled, with complications (H)     Obesity     Benign neoplasm     History of adenocarcinoma of prostate     Past Surgical History:   Procedure Laterality Date     ANGIOGRAM  2010     CARPAL TUNNEL RELEASE RT/LT  2005     COLONOSCOPY  2008     ESOPHAGOSCOPY, GASTROSCOPY, DUODENOSCOPY (EGD), COMBINED  10/17/2011    Procedure:COMBINED ESOPHAGOSCOPY, GASTROSCOPY, DUODENOSCOPY (EGD), BIOPSY SINGLE  OR MULTIPLE; Surgeon:TOÑO TAYLOR; Location:[facility]     HEMORRHOIDECTOMY       IMPLANT PACEMAKER       RELEASE CARPAL TUNNEL  8/15/2014    Procedure: RELEASE CARPAL TUNNEL;  Surgeon: Thom Hill MD;  Location: [facility]       Social History   Substance Use Topics     Smoking status: Never Smoker     Smokeless tobacco: Never Used     Alcohol use Yes      Comment: rare     Family History   Problem Relation Age of Onset     HEART DISEASE Sister      HEART DISEASE Brother      HEART DISEASE Brother      Alcohol/Drug Brother      HEART DISEASE Sister      Neurologic Disorder Daughter      parkinson's         Current Outpatient Prescriptions   Medication Sig Dispense Refill     traMADol (ULTRAM) 50 MG tablet Take 50 mg by mouth 2 times daily as needed       metoprolol (TOPROL-XL) 50 MG 24 hr tablet Take 1 in AM (50 mg) and 1/2 in PM (25 mg) 135 tablet 5     glipiZIDE (GLIPIZIDE XL) 5 MG 24 hr tablet Take 1 tablet (5 mg) by mouth daily 90 tablet 1     famotidine (PEPCID) 20 MG tablet Take 20 mg by mouth as needed       metFORMIN (GLUCOPHAGE) 1000 MG tablet Take 1 tablet (1,000 mg) by mouth 2 times daily (with meals) 180 tablet 1     tamsulosin (FLOMAX) 0.4 MG 24 hr capsule Take 1 capsule (0.4 mg) by mouth daily 90 capsule 3     simvastatin (ZOCOR) 40 MG tablet Take 0.5 tablets (20 mg) by mouth At Bedtime 90 tablet 3     amLODIPine (NORVASC) 10 MG tablet Take 1 tablet (10 mg) by mouth daily 90 tablet 3     sertraline (ZOLOFT) 50 MG tablet 1 tablet orally daily 90 tablet 3     losartan (COZAAR) 50 MG tablet Take 1 tablet (50 mg) by mouth daily 90 tablet 3     blood glucose monitoring (ACCU-CHEK GEOFFREY) test strip Use to test blood sugars 1 times daily or as directed. 100 each 2     aspirin 81 MG tablet Take 1 tablet (81 mg) by mouth daily 30 tablet      docusate sodium (COLACE) 100 MG capsule Take 100 mg by mouth daily        menthol, Topical Analgesic, 2.5% (BENGAY VANISHIN SCENT) 2.5 % GEL topical gel Apply to neck  "twice daily as needed for pain.       acetaminophen (TYLENOL) 325 MG tablet Take 325-650 mg by mouth every 6 hours as needed for mild pain Reported on 4/10/2017 100 tablet 0     nitroglycerin (NITROSTAT) 0.4 MG sublingual tablet Place 1 tablet (0.4 mg) under the tongue every 5 minutes as needed 60 tablet 0     acetaminophen 500 MG CAPS Take 1 tablet by mouth At Bedtime Reported on 4/10/2017       ORDER FOR DME Equipment being ordered: Thigh high compression stockings 1 Units 1     Allergies   Allergen Reactions     Nkda [No Known Drug Allergies]        Reviewed and updated as needed this visit by clinical staff  Tobacco  Allergies  Meds  Med Hx  Surg Hx  Fam Hx  Soc Hx      Reviewed and updated as needed this visit by Provider         ROS:  C: NEGATIVE for fever, chills, change in weight  I: NEGATIVE for worrisome rashes, moles or lesions  E: NEGATIVE for vision changes or irritation  R: NEGATIVE for significant cough or SOB  CV: NEGATIVE for chest pain, palpitations or peripheral edema  GI: NEGATIVE for nausea, abdominal pain, heartburn, or change in bowel habits  : NEGATIVE for frequency, dysuria, or hematuria  M: NEGATIVE for significant arthralgias or myalgia  N: NEGATIVE for weakness, dizziness or paresthesias  E: NEGATIVE for temperature intolerance, skin/hair changes  H: NEGATIVE for bleeding problems    OBJECTIVE:                                                    /59  Pulse 74  Temp 97.9  F (36.6  C) (Tympanic)  Ht 5' 6\" (1.676 m)  Wt 198 lb 3.2 oz (89.9 kg)  BMI 31.99 kg/m2  Body mass index is 31.99 kg/(m^2).  GENERAL:  alert and no distress, ambulatory w/o assist  RESP: lungs clear to auscultation - no rales, no rhonchi, no wheezes  CV: regular rates and rhythm, no murmur  MS: no edema  SKIN: no rashes  ABD:  nontender  PSYCH: normal mood/affect, linear thought process, no suicidality, good insight and judgement    Diagnostic test results:  Diagnostic Test Results:  Results for orders " placed or performed in visit on 03/28/17   Hemoglobin A1c   Result Value Ref Range    Hemoglobin A1C 5.8 4.3 - 6.0 %        ASSESSMENT/PLAN:                                                        ICD-10-CM    1. Controlled type 2 diabetes mellitus with other diabetic kidney complication, without long-term current use of insulin (H) E11.29 Patient has had controlled DM for a while.  He may continue measuring glucose once a week, with daily measurements for several days to see trend  if BG > 200.  Continue current DM management.     2. Advanced directives, counseling/discussion Z71.89 Discussed with patient his choices in the POLST form.  Filled out the form as he expressed his decisions.  He has been determined of sound mentation/cognition.         Follow up with Provider - prn   There are no Patient Instructions on file for this visit.    Stefan Trejo MD  [facility]

## 2017-04-10 NOTE — MR AVS SNAPSHOT
After Visit Summary   4/10/2017    Derrick Silva    MRN: [id number]           Patient Information     Date Of Birth          3/7/1932        Visit Information        Provider Department      4/10/2017 7:20 AM Stefan Trejo MD [facility]        Today's Diagnoses     Controlled type 2 diabetes mellitus with other diabetic kidney complication, without long-term current use of insulin (H)    -  1    Advanced directives, counseling/discussion          Care Instructions    You may measure your own blood sugar once a week    POLST form has been filled out.    The above orders/form will be faxed to your assisted living office as requested.          Follow-ups after your visit        Follow-up notes from your care team     Return if symptoms worsen or fail to improve.      Your next 10 appointments already scheduled     May 01, 2017  1:00 PM CDT   Remote PPM Check with [facility]   [facility] ([facility])    [address] [address] [phone]           This appointment is for a remote check of your pacemaker.  This is not an appointment at the office.            May 02, 2017 10:00 AM CDT   TELEMEDICINE with Patricia Osorio [facility] ([facility])    [address] [address] [phone]           Note: this is not an onsite visit; there is no need to come to the facility.              Who to contact     If you have questions or need follow up information about today's clinic visit or your schedule please contact [facility] directly at [phone].  Normal or non-critical lab and imaging results will be communicated to you by MyChart, letter or phone within 4 business days after the clinic has received the results. If you do not hear from us within 7 days, please contact the clinic through MyChart or phone. If you have a  "critical or abnormal lab result, we will notify you by phone as soon as possible.  Submit refill requests through [employer] or call your pharmacy and they will forward the refill request to us. Please allow 3 business days for your refill to be completed.          Additional Information About Your Visit        MyChart Information     [employer] lets you send messages to your doctor, view your test results, renew your prescriptions, schedule appointments and more. To sign up, go to [email or web address] . Click on \"Log in\" on the left side of the screen, which will take you to the Welcome page. Then click on \"Sign up Now\" on the right side of the page.     You will be asked to enter the access code listed below, as well as some personal information. Please follow the directions to create your username and password.     Your access code is: [email or web address]  Expires: 2017  4:28 PM     Your access code will  in 90 days. If you need help or a new code, please call your [place] clinic or [phone].        Care EveryWhere ID     This is your Care EveryWhere ID. This could be used by other organizations to access your [place] medical records  [id number]        Your Vitals Were     Pulse Temperature Height BMI (Body Mass Index)          74 97.9  F (36.6  C) (Tympanic) 5' 6\" (1.676 m) 31.99 kg/m2         Blood Pressure from Last 3 Encounters:   04/10/17 122/59   17 107/63   17 119/65    Weight from Last 3 Encounters:   04/10/17 198 lb 3.2 oz (89.9 kg)   17 198 lb (89.8 kg)   17 197 lb (89.4 kg)              Today, you had the following     No orders found for display       Primary Care Provider Office Phone # Fax #    Stefan Trejo [phone]       [facility]        [address] [address]        Thank you!     Thank you for choosing [facility]  for your care. Our goal is always to provide you with " excellent care. Hearing back from our patients is one way we can continue to improve our services. Please take a few minutes to complete the written survey that you may receive in the mail after your visit with us. Thank you!             Your Updated Medication List - Protect others around you: Learn how to safely use, store and throw away your medicines at www.disposemymeds.org.          This list is accurate as of: 4/10/17 12:42 PM.  Always use your most recent med list.                   Brand Name Dispense Instructions for use    * acetaminophen 500 MG Caps      Take 1 tablet by mouth At Bedtime Reported on 4/10/2017       * acetaminophen 325 MG tablet    TYLENOL    100 tablet    Take 325-650 mg by mouth every 6 hours as needed for mild pain Reported on 4/10/2017       amLODIPine 10 MG tablet    NORVASC    90 tablet    Take 1 tablet (10 mg) by mouth daily       aspirin 81 MG tablet     30 tablet    Take 1 tablet (81 mg) by mouth daily       blood glucose monitoring test strip    ACCU-CHEK GEOFFREY    100 each    Use to test blood sugars 1 times daily or as directed.       docusate sodium 100 MG capsule    COLACE     Take 100 mg by mouth daily       glipiZIDE 5 MG 24 hr tablet    glipiZIDE XL    90 tablet    Take 1 tablet (5 mg) by mouth daily       losartan 50 MG tablet    COZAAR    90 tablet    Take 1 tablet (50 mg) by mouth daily       menthol (Topical Analgesic) 2.5% 2.5 % Gel topical gel    BENGAY VANISHIN SCENT     Apply to neck twice daily as needed for pain.       metFORMIN 1000 MG tablet    GLUCOPHAGE    180 tablet    Take 1 tablet (1,000 mg) by mouth 2 times daily (with meals)       metoprolol 50 MG 24 hr tablet    TOPROL-XL    135 tablet    Take 1 in AM (50 mg) and 1/2 in PM (25 mg)       nitroglycerin 0.4 MG sublingual tablet    NITROSTAT    60 tablet    Place 1 tablet (0.4 mg) under the tongue every 5 minutes as needed       order for DME     1 Units    Equipment being ordered: Thigh high compression  stockings       PEPCID 20 MG tablet   Generic drug:  famotidine      Take 20 mg by mouth as needed       sertraline 50 MG tablet    ZOLOFT    90 tablet    1 tablet orally daily       simvastatin 40 MG tablet    ZOCOR    90 tablet    Take 0.5 tablets (20 mg) by mouth At Bedtime       tamsulosin 0.4 MG capsule    FLOMAX    90 capsule    Take 1 capsule (0.4 mg) by mouth daily       traMADol 50 MG tablet    ULTRAM     Take 50 mg by mouth 2 times daily as needed       * Notice:  This list has 2 medication(s) that are the same as other medications prescribed for you. Read the directions carefully, and ask your doctor or other care provider to review them with you.

## 2017-04-22 ENCOUNTER — TELEPHONE (OUTPATIENT)
Dept: FAMILY MEDICINE | Facility: CLINIC | Age: 82
End: 2017-04-22

## 2017-04-22 DIAGNOSIS — E11.8 CONTROLLED TYPE 2 DIABETES MELLITUS WITH COMPLICATION, WITHOUT LONG-TERM CURRENT USE OF INSULIN (H): Primary | ICD-10-CM

## 2017-04-24 NOTE — TELEPHONE ENCOUNTER
Metformin        Last Written Prescription Date: 11/02/16  Last Fill Quantity: 180, # refills: 1  Last Office Visit with [facility], [facility] or [facility] prescribing provider:  04/10/17        BP Readings from Last 3 Encounters:   04/10/17 122/59   03/28/17 107/63   02/21/17 119/65     Lab Results   Component Value Date    MICROL 31 09/19/2016     Lab Results   Component Value Date    UMALCR 35.86 09/19/2016     Creatinine   Date Value Ref Range Status   11/14/2016 1.79 (H) 0.66 - 1.25 mg/dL Final   ]  GFR Estimate   Date Value Ref Range Status   11/14/2016 36 (L) >60 mL/min/1.7m2 Final     Comment:     Non  GFR Calc   11/10/2016 38 (L) >60 mL/min/1.7m2 Final     Comment:     Non  GFR Calc   09/19/2016 40 (L) >60 mL/min/1.7m2 Final     Comment:     Non  GFR Calc     GFR Estimate If Black   Date Value Ref Range Status   11/14/2016 44 (L) >60 mL/min/1.7m2 Final     Comment:      GFR Calc   11/10/2016 45 (L) >60 mL/min/1.7m2 Final     Comment:      GFR Calc   09/19/2016 48 (L) >60 mL/min/1.7m2 Final     Comment:      GFR Calc     Lab Results   Component Value Date    CHOL 158 09/19/2016     Lab Results   Component Value Date    HDL 37 09/19/2016     Lab Results   Component Value Date    LDL 85 09/19/2016     Lab Results   Component Value Date    TRIG 178 09/19/2016     Lab Results   Component Value Date    CHOLHDLRATIO 4.3 04/13/2015     Lab Results   Component Value Date    AST 20 11/10/2016     Lab Results   Component Value Date    ALT 37 11/10/2016     Lab Results   Component Value Date    A1C 5.8 03/28/2017    A1C 5.8 09/19/2016    A1C 6.0 07/12/2016    A1C 6.0 03/14/2016    A1C 6.0 09/09/2015     Potassium   Date Value Ref Range Status   11/14/2016 4.4 3.4 - 5.3 mmol/L Final

## 2017-04-24 NOTE — TELEPHONE ENCOUNTER
Routing refill request to provider for review/approval because:  Labs out of range:  See below    Bhargavi Helms RN

## 2017-04-25 NOTE — TELEPHONE ENCOUNTER
Reviewed patient's chart.  Patient is requesting refill of Metformin.  Noticed that his GFR has decreased to 30.  Per recommendations, if this occurs, reduction of dose to 50% of the current may be done.    Decrease metformin to 500 mg BID.    Recheck BMP and A1c in July or August 2017.  Orders placed.  Patient should continue carb controlled diet.

## 2017-04-27 NOTE — TELEPHONE ENCOUNTER
"Patient returned the call, \"yes, we have moved, \"  Relayed the message from Dr Trejo below.   \"Ok, so I will break the 1000 in half and take a half until they are gone and then get the new bottle of 500's. Thank you, \"    He was advised due for labs in July or August, and to continue the car controlled diet. \"ok, thanks, we just moved and things are up in the air. Will do. \"  Shawna Rapp RNC    "

## 2017-04-27 NOTE — TELEPHONE ENCOUNTER
"Tried home, it is not in service.   Tried a cell phone number I found on a consent to communicate PHI found under media tab,3/9/15  and that cell phone says \"the caller is not available or has travelled outside the service area, please try your call again later. \"    Left yet another message with his son Josue to have him call us or call us back with a good number to reach [facility].       I went down the list calling, and reached his daughter who gave me a new phone number for [facility]. I did get a voicemail and left him a message to call us back.   Shawna Rapp RNC    "

## 2017-05-01 ENCOUNTER — HOSPITAL ENCOUNTER (OUTPATIENT)
Dept: CARDIOLOGY | Facility: CLINIC | Age: 82
Discharge: HOME | End: 2017-05-01
Attending: INTERNAL MEDICINE | Admitting: INTERNAL MEDICINE
Payer: MEDICARE

## 2017-05-01 PROCEDURE — 93294 REM INTERROG EVL PM/LDLS PM: CPT | Performed by: INTERNAL MEDICINE

## 2017-05-01 PROCEDURE — 93296 REM INTERROG EVL PM/IDS: CPT

## 2017-05-02 ENCOUNTER — TELEPHONE (OUTPATIENT)
Dept: PHARMACY | Facility: CLINIC | Age: 82
End: 2017-05-02

## 2017-05-02 ENCOUNTER — ALLIED HEALTH/NURSE VISIT (OUTPATIENT)
Dept: PHARMACY | Facility: CLINIC | Age: 82
End: 2017-05-02
Payer: COMMERCIAL

## 2017-05-02 DIAGNOSIS — E11.22 CONTROLLED TYPE 2 DIABETES MELLITUS WITH STAGE 3 CHRONIC KIDNEY DISEASE, WITHOUT LONG-TERM CURRENT USE OF INSULIN (H): Primary | ICD-10-CM

## 2017-05-02 DIAGNOSIS — N18.30 CONTROLLED TYPE 2 DIABETES MELLITUS WITH STAGE 3 CHRONIC KIDNEY DISEASE, WITHOUT LONG-TERM CURRENT USE OF INSULIN (H): Primary | ICD-10-CM

## 2017-05-02 PROCEDURE — 99606 MTMS BY PHARM EST 15 MIN: CPT | Mod: U4 | Performed by: PHARMACIST

## 2017-05-02 NOTE — PATIENT INSTRUCTIONS
1. Continue current drug therapy.  2. Keep up the good work!    We should follow up in 6 months.     Patricia Osorio, PharmD  [phone] in clinic on Tuesday and Wednesday

## 2017-05-02 NOTE — PROGRESS NOTES
SUBJECTIVE/OBJECTIVE:                                                    Derrick Silva is a 85 year old male called for a follow-up visit for Medication Therapy Management.  He was referred to me from [facility].     Chief Complaint: none. Follow up visit from 4/4/2017    Allergies/ADRs: None  Tobacco: No tobacco use   Alcohol: not currently using    PMH: Reviewed in Epic    Medication Adherence: no issues reported by patient.     Diabetes:  Pt currently taking glipizide XL 5 mg daily and metformin 500 mg twice daily with meals, Pt is not experiencing side effects.  SMBG: one time every few days. Ranges (patient reported): He is checking his blood sugars every Monday morning, they are running 108-130. His blood sugars are not going too low.    Frequency of hypoglycemia? never.  Eye exam: up to date  Foot exam: up to date  Microalbumin is not < 30 mg/g. Pt is taking an ACEi/ARB.  Aspirin: Taking 81mg daily and denies side effects  Patient gets a yearly flu vaccine and has had both pneumonia vaccines.     Current labs include:  BP Readings from Last 3 Encounters:   04/10/17 122/59   03/28/17 107/63   02/21/17 119/65     Today's Vitals: There were no vitals taken for this visit.  Lab Results   Component Value Date    A1C 5.8 03/28/2017   .  Lab Results   Component Value Date    CHOL 158 09/19/2016     Lab Results   Component Value Date    TRIG 178 09/19/2016     Lab Results   Component Value Date    HDL 37 09/19/2016     Lab Results   Component Value Date    LDL 85 09/19/2016       Liver Function Studies -   Recent Labs   Lab Test  11/10/16   1101   PROTTOTAL  7.3   ALBUMIN  3.8   BILITOTAL  0.3   ALKPHOS  54   AST  20   ALT  37       Lab Results   Component Value Date    UCRR 86 09/19/2016    MICROL 31 09/19/2016    UMALCR 35.86 (H) 09/19/2016       Last Basic Metabolic Panel:  Lab Results   Component Value Date     11/14/2016      Lab Results   Component Value Date    POTASSIUM 4.4 11/14/2016      Lab Results   Component Value Date    CHLORIDE 107 11/14/2016     Lab Results   Component Value Date    BUN 33 11/14/2016     Lab Results   Component Value Date    CR 1.79 11/14/2016     GFR Estimate   Date Value Ref Range Status   11/14/2016 36 (L) >60 mL/min/1.7m2 Final     Comment:     Non  GFR Calc   11/10/2016 38 (L) >60 mL/min/1.7m2 Final     Comment:     Non  GFR Calc   09/19/2016 40 (L) >60 mL/min/1.7m2 Final     Comment:     Non  GFR Calc     GFR Estimate If Black   Date Value Ref Range Status   11/14/2016 44 (L) >60 mL/min/1.7m2 Final     Comment:      GFR Calc   11/10/2016 45 (L) >60 mL/min/1.7m2 Final     Comment:      GFR Calc   09/19/2016 48 (L) >60 mL/min/1.7m2 Final     Comment:      GFR Calc     TSH   Date Value Ref Range Status   09/09/2015 1.14 0.40 - 4.00 mU/L Final   ]    Most Recent Immunizations   Administered Date(s) Administered     Influenza (High Dose) 3 valent vaccine 10/03/2016     Influenza (IIV3) 11/22/2011     Influenza Vaccine IM 3yrs+ 4 Valent IIV4 12/27/2013     Pneumococcal (PCV 13) 10/03/2016     Pneumococcal 23 valent 10/06/2000     TD (ADULT, 7+) 10/06/2000     TDAP Vaccine (Adacel) 01/15/2014     Varicella 01/03/2002     ASSESSMENT:                                                    Current medications were reviewed today as discussed above.      Medication Adherence: no issues identified    Diabetes: Stable. Patient is meeting A1c goal of < 7%. Blood sugars are at goal.     PLAN:                                                      1. Continue current drug therapy.    At follow up: monitor GFR, if continues to decline - consider stopping metformin.    I spent 15 minutes with this patient today.  All changes were made via collaborative practice agreement with Stefan Trejo. A copy of the visit note was provided to the patient's primary care provider.     Will follow  up in 6 months.    The patient declined a summary of these recommendations as an after visit summary.    Patricia Osorio, PharmD  Medication Therapy Management

## 2017-05-02 NOTE — MR AVS SNAPSHOT
After Visit Summary   5/2/2017    Derrick Silva    MRN: [id number]           Patient Information     Date Of Birth          3/7/1932        Visit Information        Provider Department      5/2/2017 10:00 AM Patricia Osorio [facility]        Today's Diagnoses     Controlled type 2 diabetes mellitus with stage 3 chronic kidney disease, without long-term current use of insulin (H)    -  1      Care Instructions    1. Continue current drug therapy.  2. Keep up the good work!    We should follow up in 6 months.     Patricia Osorio, PharmD  [phone] in clinic on Tuesday and Wednesday          Follow-ups after your visit        Your next 10 appointments already scheduled     May 17, 2017  1:00 PM CDT   Return Visit with Ellie Cummings PA-C   [facility] PHYSICIAN HEART AT [facility] ([facility])    [address] [address] [phone]            Aug 21, 2017  9:00 AM CDT   Remote PPM Check with [facility]   [facility] ([facility])    [address] [address] [phone]           This appointment is for a remote check of your pacemaker.  This is not an appointment at the office.              Who to contact     If you have questions or need follow up information about today's clinic visit or your schedule please contact [facility] directly at [phone].  Normal or non-critical lab and imaging results will be communicated to you by MyChart, letter or phone within 4 business days after the clinic has received the results. If you do not hear from us within 7 days, please contact the clinic through MyChart or phone. If you have a critical or abnormal lab result, we will notify you by phone as soon as possible.  Submit refill requests through [employer] or call your pharmacy and they will forward the refill request to us.  "Please allow 3 business days for your refill to be completed.          Additional Information About Your Visit        MyChart Information     [employer] lets you send messages to your doctor, view your test results, renew your prescriptions, schedule appointments and more. To sign up, go to [email or web address] . Click on \"Log in\" on the left side of the screen, which will take you to the Welcome page. Then click on \"Sign up Now\" on the right side of the page.     You will be asked to enter the access code listed below, as well as some personal information. Please follow the directions to create your username and password.     Your access code is: [email or web address]  Expires: 2017 12:46 PM     Your access code will  in 90 days. If you need help or a new code, please call your [facility] clinic or [phone].        Care EveryWhere ID     This is your Care EveryWhere ID. This could be used by other organizations to access your [facility] medical records  [id number]         Blood Pressure from Last 3 Encounters:   04/10/17 122/59   17 107/63   17 119/65    Weight from Last 3 Encounters:   04/10/17 198 lb 3.2 oz (89.9 kg)   17 198 lb (89.8 kg)   17 197 lb (89.4 kg)              Today, you had the following     No orders found for display         Today's Medication Changes          These changes are accurate as of: 17 12:46 PM.  If you have any questions, ask your nurse or doctor.               These medicines have changed or have updated prescriptions.        Dose/Directions    metFORMIN 500 MG tablet   Commonly known as:  GLUCOPHAGE   This may have changed:  Another medication with the same name was removed. Continue taking this medication, and follow the directions you see here.   Used for:  Controlled type 2 diabetes mellitus with complication, without long-term current use of insulin (H)   Changed by:  Stefan Trejo MD        Dose:  500 mg   Take 1 tablet (500 mg) by " mouth 2 times daily (with meals)   Quantity:  180 tablet   Refills:  3                Primary Care Provider Office Phone # Fax #    Stefan Trejo [phone]       [facility]        [address] [place] [address]        Thank you!     Thank you for choosing [facility]  for your care. Our goal is always to provide you with excellent care. Hearing back from our patients is one way we can continue to improve our services. Please take a few minutes to complete the written survey that you may receive in the mail after your visit with us. Thank you!             Your Updated Medication List - Protect others around you: Learn how to safely use, store and throw away your medicines at www.disposemymeds.org.          This list is accurate as of: 5/2/17 12:46 PM.  Always use your most recent med list.                   Brand Name Dispense Instructions for use    * acetaminophen 500 MG Caps      Take 1 tablet by mouth At Bedtime Reported on 4/10/2017       * acetaminophen 325 MG tablet    TYLENOL    100 tablet    Take 325-650 mg by mouth every 6 hours as needed for mild pain Reported on 4/10/2017       amLODIPine 10 MG tablet    NORVASC    90 tablet    Take 1 tablet (10 mg) by mouth daily       aspirin 81 MG tablet     30 tablet    Take 1 tablet (81 mg) by mouth daily       blood glucose monitoring test strip    ACCU-CHEK GEOFFREY    100 each    Use to test blood sugars 1 times daily or as directed.       docusate sodium 100 MG capsule    COLACE     Take 100 mg by mouth daily       glipiZIDE 5 MG 24 hr tablet    glipiZIDE XL    90 tablet    Take 1 tablet (5 mg) by mouth daily       losartan 50 MG tablet    COZAAR    90 tablet    Take 1 tablet (50 mg) by mouth daily       menthol (Topical Analgesic) 2.5% 2.5 % Gel topical gel    BENGAY VANISHIN SCENT     Apply to neck twice daily as needed for pain.       metFORMIN 500 MG tablet    GLUCOPHAGE     180 tablet    Take 1 tablet (500 mg) by mouth 2 times daily (with meals)       metoprolol 50 MG 24 hr tablet    TOPROL-XL    135 tablet    Take 1 in AM (50 mg) and 1/2 in PM (25 mg)       nitroglycerin 0.4 MG sublingual tablet    NITROSTAT    60 tablet    Place 1 tablet (0.4 mg) under the tongue every 5 minutes as needed       order for DME     1 Units    Equipment being ordered: Thigh high compression stockings       PEPCID 20 MG tablet   Generic drug:  famotidine      Take 20 mg by mouth as needed       sertraline 50 MG tablet    ZOLOFT    90 tablet    1 tablet orally daily       simvastatin 40 MG tablet    ZOCOR    90 tablet    Take 0.5 tablets (20 mg) by mouth At Bedtime       tamsulosin 0.4 MG capsule    FLOMAX    90 capsule    Take 1 capsule (0.4 mg) by mouth daily       traMADol 50 MG tablet    ULTRAM     Take 50 mg by mouth 2 times daily as needed       * Notice:  This list has 2 medication(s) that are the same as other medications prescribed for you. Read the directions carefully, and ask your doctor or other care provider to review them with you.

## 2017-05-02 NOTE — TELEPHONE ENCOUNTER
I called patient to follow up. Left a message asking him to return my call.   Patricia Osorio, PharmD  Medication Therapy Management

## 2017-05-17 ENCOUNTER — OFFICE VISIT (OUTPATIENT)
Dept: CARDIOLOGY | Facility: CLINIC | Age: 82
End: 2017-05-17
Attending: INTERNAL MEDICINE
Payer: COMMERCIAL

## 2017-05-17 VITALS
WEIGHT: 206 LBS | SYSTOLIC BLOOD PRESSURE: 126 MMHG | DIASTOLIC BLOOD PRESSURE: 68 MMHG | BODY MASS INDEX: 33.25 KG/M2 | OXYGEN SATURATION: 96 % | HEART RATE: 95 BPM

## 2017-05-17 DIAGNOSIS — E78.5 HYPERLIPIDEMIA LDL GOAL <100: ICD-10-CM

## 2017-05-17 DIAGNOSIS — I25.5 CARDIOMYOPATHY, ISCHEMIC: ICD-10-CM

## 2017-05-17 DIAGNOSIS — I25.10 CAD IN NATIVE ARTERY: Primary | ICD-10-CM

## 2017-05-17 DIAGNOSIS — I10 BENIGN ESSENTIAL HYPERTENSION: Chronic | ICD-10-CM

## 2017-05-17 PROCEDURE — 99214 OFFICE O/P EST MOD 30 MIN: CPT | Performed by: PHYSICIAN ASSISTANT

## 2017-05-17 NOTE — LETTER
5/17/2017    Stefan Trejo MD  [facility]          [address] [place] [address]    RE: Derrick Silva       Dear Colleague,    I had the pleasure of seeing Derrick Silva in the [facility].    Mr. Silva is a pleasant 85-year-old gentleman who presents to Cardiology office today for a 6-month followup.      His cardiac history includes coronary artery disease with prior cardiac catheterization at [facility] showing significant multivessel coronary disease with several severe stenoses in the distal arteries.  He was noted to have a 90% calcified stenosis in the mid to distal LAD along with a 50% stenosis in the proximal LAD, moderate stenosis in the proximal circumflex and CTOs of both the first obtuse marginal and mid RCA.  Medical management was recommended at that time.  He also has a history of high-grade AV block and is status post pacemaker implant in 2013.  He has a known ischemic cardiomyopathy and his EF has been in the 40-45% range.      Again, he presents today for routine 6-month followup.  From a cardiac standpoint, he is overall doing okay.  He is not having any chest discomfort.  He does have dyspnea on exertion, but feels that this is stable.  He is not having any orthopnea, PND or lower extremity edema.  Unfortunately, he has been having quite a bit of social stress.  He and his wife recently moved to an assisted living building due to them needing a higher level of care because of her medical problems.  Unfortunately, they do not feel that they are getting the help that they need.  This is quite stressful for Derrick and he is not enjoying his current living situation.  Additionally, he has been having some PTSD type symptoms from his prior work as a  for a number of years.  He does not have any cardiac concerns.  He is taking his medications as directed and denies any side effects.      CURRENT CARDIAC  MEDICATIONS:   1.  Toprol-XL 50 mg 1 tablet in the a.m., half tablet in the p.m.     2.  Sublingual nitro p.r.n.   3.  Simvastatin 40 mg half tablet daily.   4.  Amlodipine 10 mg daily.   5.  Losartan 50 mg daily.   6.  Aspirin 81 mg daily.      The remainder of his medications, allergies and review of systems were reviewed and as are documented separately.      PHYSICAL EXAMINATION:   GENERAL:  The patient is a pleasant 85-year-old gentleman who appears his stated age.  He is in no apparent distress.   VITAL SIGNS:  His blood pressure is 126/68, pulse is 95.  Weight is 206 pounds.   LUNGS:  Clear to auscultation bilaterally.   CARDIAC:  Reveals a regular rate and rhythm, no murmurs appreciated.   ABDOMEN:  Soft, nontender, nondistended.   EXTREMITIES:  Lower extremities show trace edema bilaterally.   NEUROLOGIC:  Alert and oriented.      He continues to follow routinely in the [facility].  He did have a pacemaker check earlier this month.      Again, several episodes concerning for PMT were noted.  However, when these have been reviewed previously they are not actual PMT, but instead episodes of sinus tach with tracking at the upper rate of 120.      ASSESSMENT:   1.  Coronary artery disease, multivessel, as detailed above.  I do not believe he has actually undergone any intervention in the past.  He continues on aspirin and statin therapy.  He really does not seem to be having any significant anginal symptoms at this time.  He does have dyspnea on exertion, but this is stable.   2.  Ischemic cardiomyopathy with an EF in the 40-45% range.  He is not having any signs or symptoms of heart failure at this time.  He will continue his current medication regimen as is.  I will order a repeat echocardiogram for this fall as it has been a couple of years since we have reassessed his EF.   3.  Hypertension, overall well controlled.   4.  History of permanent pacemaker implantation due to high grade AV block.  He  continues to follow in the Device Clinic.   5.  Hyperlipidemia.  He continues on moderate potency statin therapy which is appropriate for his age.      PLAN:   1.  No medication changes were made at this time.   2.  I did encourage the patient to try to talk to his family or his primary care provider about his ongoing stressors in his life.  The patient did state that he felt better just sharing at today's office visit.   3.  I recommended following up in the Cardiology Clinic in approximately 6 months.  Again, I will order a repeat echocardiogram to be performed prior to that office visit.  Of course, I encouraged him to contact us sooner with questions, concerns or change in symptomatology.     Again, thank you for allowing me to participate in the care of your patient.      Sincerely,    Ellie Cummings PA-C

## 2017-05-17 NOTE — PATIENT INSTRUCTIONS
Thank you for your [facility] visit today. Your provider has recommended the following:  Medication Changes:  No changes today  Recommendations:  Nothing new at this time  Follow-up:  See Dr Gann for cardiology follow up in 6 months.  We kindly ask that you call cardiology scheduling at [phone] three months prior to requested revisit date to schedule future cardiology appointments.  Reminder:  1. Please bring in your current medication list or your medication, over the counter supplements and vitamin bottles as we will review these at each office visit.               [facility]  [facility]~[address]~[place], [place]~[address]  Questions about your visit today?  Call your Cardiology Clinic RN's-Paty Giles and/or Renee Lopez at [phone].

## 2017-05-17 NOTE — PROGRESS NOTES
Please see separate dictation for HPI, PHYSICAL EXAM AND IMPRESSION/PLAN.    CURRENT MEDICATIONS:  Current Outpatient Prescriptions   Medication Sig Dispense Refill     metFORMIN (GLUCOPHAGE) 500 MG tablet Take 1 tablet (500 mg) by mouth 2 times daily (with meals) 180 tablet 3     menthol, Topical Analgesic, 2.5% (BENGAY VANISHIN SCENT) 2.5 % GEL topical gel Apply to neck twice daily as needed for pain.       traMADol (ULTRAM) 50 MG tablet Take 50 mg by mouth 2 times daily as needed       acetaminophen (TYLENOL) 325 MG tablet Take 325-650 mg by mouth every 6 hours as needed for mild pain Reported on 4/10/2017 100 tablet 0     metoprolol (TOPROL-XL) 50 MG 24 hr tablet Take 1 in AM (50 mg) and 1/2 in PM (25 mg) 135 tablet 5     glipiZIDE (GLIPIZIDE XL) 5 MG 24 hr tablet Take 1 tablet (5 mg) by mouth daily 90 tablet 1     nitroglycerin (NITROSTAT) 0.4 MG sublingual tablet Place 1 tablet (0.4 mg) under the tongue every 5 minutes as needed 60 tablet 0     famotidine (PEPCID) 20 MG tablet Take 20 mg by mouth as needed       tamsulosin (FLOMAX) 0.4 MG 24 hr capsule Take 1 capsule (0.4 mg) by mouth daily 90 capsule 3     simvastatin (ZOCOR) 40 MG tablet Take 0.5 tablets (20 mg) by mouth At Bedtime 90 tablet 3     amLODIPine (NORVASC) 10 MG tablet Take 1 tablet (10 mg) by mouth daily 90 tablet 3     acetaminophen 500 MG CAPS Take 1 tablet by mouth At Bedtime Reported on 4/10/2017       sertraline (ZOLOFT) 50 MG tablet 1 tablet orally daily 90 tablet 3     losartan (COZAAR) 50 MG tablet Take 1 tablet (50 mg) by mouth daily 90 tablet 3     blood glucose monitoring (ACCU-CHEK GEOFFREY) test strip Use to test blood sugars 1 times daily or as directed. 100 each 2     aspirin 81 MG tablet Take 1 tablet (81 mg) by mouth daily 30 tablet      ORDER FOR DME Equipment being ordered: Thigh high compression stockings 1 Units 1     docusate sodium (COLACE) 100 MG capsule Take 100 mg by mouth daily          ALLERGIES:     Allergies   Allergen  Reactions     Nkda [No Known Drug Allergies]        PAST MEDICAL HISTORY:  Past Medical History:   Diagnosis Date     Adenomatous polyps 5/16/2013     Advance Care Planning 1/15/2014    Advance Care Planning: Receipt of ACP document:  Received: Health Care Directive which was witnessed or notarized on 4/25/2011.  Document previously scanned on 1/31/2014.  Validation form completed and sent to be scanned.  Code Status reflects choices in most recent ACP document.  Confirmed/documented designated decision maker(s). See permanent comments section of demographics in clinical tab. Vie     Arteriosclerotic heart disease 5/16/2013     BPH (benign prostatic hyperplasia) 5/16/2013     Diabetes mellitus (H)      Diabetic nephropathy (H) 1/26/2015     MEJIA (dyspnea on exertion)      Exertional angina (H) 5/16/2013     GERD (gastroesophageal reflux disease) 5/16/2013     HTN (hypertension) 5/16/2013     Hyperlipidemia LDL goal <100 6/10/2013     Hypertension      Obese      Onychia of toe 5/16/2013     Osteoarthritis 5/16/2013     Prostate cancer (H) 5/16/2013     Type 2 diabetes, HbA1C goal < 8% (H) 5/16/2013       PAST SURGICAL HISTORY:  Past Surgical History:   Procedure Laterality Date     ANGIOGRAM  2010     CARPAL TUNNEL RELEASE RT/LT  2005     COLONOSCOPY  2008     ESOPHAGOSCOPY, GASTROSCOPY, DUODENOSCOPY (EGD), COMBINED  10/17/2011    Procedure:COMBINED ESOPHAGOSCOPY, GASTROSCOPY, DUODENOSCOPY (EGD), BIOPSY SINGLE OR MULTIPLE; Surgeon:TOÑO TAYLOR; Location:[facility]     HEMORRHOIDECTOMY       IMPLANT PACEMAKER       RELEASE CARPAL TUNNEL  8/15/2014    Procedure: RELEASE CARPAL TUNNEL;  Surgeon: Thom Hill MD;  Location: [facility]       SOCIAL HISTORY:  Social History     Social History     Marital status:      Spouse name: N/A     Number of children: N/A     Years of education: N/A     Social History Main Topics     Smoking status: Never Smoker     Smokeless tobacco: Never Used     Alcohol use Yes       Comment: rare     Drug use: No     Sexual activity: Not Currently     Partners: Female     Other Topics Concern     Parent/Sibling W/ Cabg, Mi Or Angioplasty Before 65f 55m? Yes     Social History Narrative       FAMILY HISTORY:  Family History   Problem Relation Age of Onset     HEART DISEASE Sister      HEART DISEASE Brother      HEART DISEASE Brother      Alcohol/Drug Brother      HEART DISEASE Sister      Neurologic Disorder Daughter      parkinson's       Review of Systems:  Skin:  Positive for itching     Eyes:  Positive for visual blurring    ENT:  Positive for tinnitus;nasal congestion    Respiratory:  Positive for dyspnea on exertion unchanged   Cardiovascular:  Negative for;palpitations;chest pain;edema Positive for;fatigue;lightheadedness;dizziness;syncope or near-syncope    Gastroenterology: Positive for heartburn;excessive gas or bloating;abdominal pain;nausea;vomiting;poor appetite    Genitourinary:  Positive for urinary frequency    Musculoskeletal:  Positive for joint pain;joint swelling;foot pain    Neurologic:  Positive for numbness or tingling of feet;numbness or tingling of hands;memory problems    Psychiatric:  Positive for depression    Heme/Lymph/Imm:  Negative      Endocrine:  Positive for diabetes       Reviewed. Remainder of the note dictated.    Ellie Cummings PA-C

## 2017-05-17 NOTE — MR AVS SNAPSHOT
After Visit Summary   5/17/2017    Derrick Silva    MRN: [id number]           Patient Information     Date Of Birth          3/7/1932        Visit Information        Provider Department      5/17/2017 1:00 PM Ellie Cummings PA-C [facility]YS[facility] AT [facility]        Today's Diagnoses     Benign essential hypertension        Hyperlipidemia LDL goal <100        Pacemaker-mediated tachycardia          Care Instructions    Thank you for your  Heart Care visit today. Your provider has recommended the following:  Medication Changes:  No changes today  Recommendations:  Nothing new at this time  Follow-up:  See Dr Gann for cardiology follow up in 6 months.  We kindly ask that you call cardiology scheduling at [phone] three months prior to requested revisit date to schedule future cardiology appointments.  Reminder:  1. Please bring in your current medication list or your medication, over the counter supplements and vitamin bottles as we will review these at each office visit.               [facility]~[address]~[place], [place]~[address]  Questions about your visit today?  Call your Cardiology Clinic RN's-Paty Giles and/or Renee Lopez at [phone].              Follow-ups after your visit        Your next 10 appointments already scheduled     Aug 21, 2017  9:00 AM CDT   Remote PPM Check with [facility]   [facility] ([facility])    [address] [address] [phone]           This appointment is for a remote check of your pacemaker.  This is not an appointment at the office.              Who to contact     If you have questions or need follow up information about today's clinic visit or your schedule please contact [facility] AT [facility] directly at [phone].  Normal or non-critical lab  "and imaging results will be communicated to you by MyChart, letter or phone within 4 business days after the clinic has received the results. If you do not hear from us within 7 days, please contact the clinic through [employer] or phone. If you have a critical or abnormal lab result, we will notify you by phone as soon as possible.  Submit refill requests through [employer] or call your pharmacy and they will forward the refill request to us. Please allow 3 business days for your refill to be completed.          Additional Information About Your Visit        [employer] Information     [employer] lets you send messages to your doctor, view your test results, renew your prescriptions, schedule appointments and more. To sign up, go to [email or web address] . Click on \"Log in\" on the left side of the screen, which will take you to the Welcome page. Then click on \"Sign up Now\" on the right side of the page.     You will be asked to enter the access code listed below, as well as some personal information. Please follow the directions to create your username and password.     Your access code is: [email or web address]  Expires: 2017 12:46 PM     Your access code will  in 90 days. If you need help or a new code, please call your [place] clinic or [phone].        Care EveryWhere ID     This is your Care EveryWhere ID. This could be used by other organizations to access your [place] medical records  [id number]        Your Vitals Were     Pulse Pulse Oximetry BMI (Body Mass Index)             95 96% 33.25 kg/m2          Blood Pressure from Last 3 Encounters:   17 126/68   04/10/17 122/59   17 107/63    Weight from Last 3 Encounters:   17 93.4 kg (206 lb)   04/10/17 89.9 kg (198 lb 3.2 oz)   17 89.8 kg (198 lb)              We Performed the Following     Follow-Up with Cardiac Advanced Practice Provider        Primary Care Provider Office Phone # Fax #    Stefan Trejo [phone] " [phone]       [facility]        [address] [address]        Thank you!     Thank you for choosing [facility] PHYSICIAN HEART AT [facility]  for your care. Our goal is always to provide you with excellent care. Hearing back from our patients is one way we can continue to improve our services. Please take a few minutes to complete the written survey that you may receive in the mail after your visit with us. Thank you!             Your Updated Medication List - Protect others around you: Learn how to safely use, store and throw away your medicines at www.disposemymeds.org.          This list is accurate as of: 5/17/17  1:30 PM.  Always use your most recent med list.                   Brand Name Dispense Instructions for use    * acetaminophen 500 MG Caps      Take 1 tablet by mouth At Bedtime Reported on 4/10/2017       * acetaminophen 325 MG tablet    TYLENOL    100 tablet    Take 325-650 mg by mouth every 6 hours as needed for mild pain Reported on 4/10/2017       amLODIPine 10 MG tablet    NORVASC    90 tablet    Take 1 tablet (10 mg) by mouth daily       aspirin 81 MG tablet     30 tablet    Take 1 tablet (81 mg) by mouth daily       blood glucose monitoring test strip    ACCU-CHEK GEOFFREY    100 each    Use to test blood sugars 1 times daily or as directed.       docusate sodium 100 MG capsule    COLACE     Take 100 mg by mouth daily       glipiZIDE 5 MG 24 hr tablet    glipiZIDE XL    90 tablet    Take 1 tablet (5 mg) by mouth daily       losartan 50 MG tablet    COZAAR    90 tablet    Take 1 tablet (50 mg) by mouth daily       menthol (Topical Analgesic) 2.5% 2.5 % Gel topical gel    BENGAY VANISHIN SCENT     Apply to neck twice daily as needed for pain.       metFORMIN 500 MG tablet    GLUCOPHAGE    180 tablet    Take 1 tablet (500 mg) by mouth 2 times daily (with meals)       metoprolol 50 MG 24 hr tablet    TOPROL-XL    135 tablet    Take 1 in AM  (50 mg) and 1/2 in PM (25 mg)       nitroglycerin 0.4 MG sublingual tablet    NITROSTAT    60 tablet    Place 1 tablet (0.4 mg) under the tongue every 5 minutes as needed       order for DME     1 Units    Equipment being ordered: Thigh high compression stockings       PEPCID 20 MG tablet   Generic drug:  famotidine      Take 20 mg by mouth as needed       sertraline 50 MG tablet    ZOLOFT    90 tablet    1 tablet orally daily       simvastatin 40 MG tablet    ZOCOR    90 tablet    Take 0.5 tablets (20 mg) by mouth At Bedtime       tamsulosin 0.4 MG capsule    FLOMAX    90 capsule    Take 1 capsule (0.4 mg) by mouth daily       traMADol 50 MG tablet    ULTRAM     Take 50 mg by mouth 2 times daily as needed       * Notice:  This list has 2 medication(s) that are the same as other medications prescribed for you. Read the directions carefully, and ask your doctor or other care provider to review them with you.

## 2017-05-18 NOTE — PROGRESS NOTES
HISTORY OF PRESENT ILLNESS:  Mr. Silva is a pleasant 85-year-old gentleman who presents to Cardiology office today for a 6-month followup.      His cardiac history includes coronary artery disease with prior cardiac catheterization at [facility] showing significant multivessel coronary disease with several severe stenoses in the distal arteries.  He was noted to have a 90% calcified stenosis in the mid to distal LAD along with a 50% stenosis in the proximal LAD, moderate stenosis in the proximal circumflex and CTOs of both the first obtuse marginal and mid RCA.  Medical management was recommended at that time.  He also has a history of high-grade AV block and is status post pacemaker implant in 2013.  He has a known ischemic cardiomyopathy and his EF has been in the 40-45% range.      Again, he presents today for routine 6-month followup.  From a cardiac standpoint, he is overall doing okay.  He is not having any chest discomfort.  He does have dyspnea on exertion, but feels that this is stable.  He is not having any orthopnea, PND or lower extremity edema.  Unfortunately, he has been having quite a bit of social stress.  He and his wife recently moved to an assisted living building due to them needing a higher level of care because of her medical problems.  Unfortunately, they do not feel that they are getting the help that they need.  This is quite stressful for Derrick and he is not enjoying his current living situation.  Additionally, he has been having some PTSD type symptoms from his prior work as a  for a number of years.  He does not have any cardiac concerns.  He is taking his medications as directed and denies any side effects.      CURRENT CARDIAC MEDICATIONS:   1.  Toprol-XL 50 mg 1 tablet in the a.m., half tablet in the p.m.     2.  Sublingual nitro p.r.n.   3.  Simvastatin 40 mg half tablet daily.   4.  Amlodipine 10 mg daily.   5.  Losartan 50 mg daily.   6.  Aspirin 81 mg daily.       The remainder of his medications, allergies and review of systems were reviewed and as are documented separately.      PHYSICAL EXAMINATION:   GENERAL:  The patient is a pleasant 85-year-old gentleman who appears his stated age.  He is in no apparent distress.   VITAL SIGNS:  His blood pressure is 126/68, pulse is 95.  Weight is 206 pounds.   LUNGS:  Clear to auscultation bilaterally.   CARDIAC:  Reveals a regular rate and rhythm, no murmurs appreciated.   ABDOMEN:  Soft, nontender, nondistended.   EXTREMITIES:  Lower extremities show trace edema bilaterally.   NEUROLOGIC:  Alert and oriented.      He continues to follow routinely in the [facility].  He did have a pacemaker check earlier this month.      Again, several episodes concerning for PMT were noted.  However, when these have been reviewed previously they are not actual PMT, but instead episodes of sinus tach with tracking at the upper rate of 120.      ASSESSMENT:   1.  Coronary artery disease, multivessel, as detailed above.  I do not believe he has actually undergone any intervention in the past.  He continues on aspirin and statin therapy.  He really does not seem to be having any significant anginal symptoms at this time.  He does have dyspnea on exertion, but this is stable.   2.  Ischemic cardiomyopathy with an EF in the 40-45% range.  He is not having any signs or symptoms of heart failure at this time.  He will continue his current medication regimen as is.  I will order a repeat echocardiogram for this fall as it has been a couple of years since we have reassessed his EF.   3.  Hypertension, overall well controlled.   4.  History of permanent pacemaker implantation due to high grade AV block.  He continues to follow in the [facility].   5.  Hyperlipidemia.  He continues on moderate potency statin therapy which is appropriate for his age.      PLAN:   1.  No medication changes were made at this time.   2.  I did encourage the patient to try  to talk to his family or his primary care provider about his ongoing stressors in his life.  The patient did state that he felt better just sharing at today's office visit.   3.  I recommended following up in the Cardiology Clinic in approximately 6 months.  Again, I will order a repeat echocardiogram to be performed prior to that office visit.  Of course, I encouraged him to contact us sooner with questions, concerns or change in symptomatology.         ARUNA ASH PA-C             D: 2017 14:24   T: 2017 12:39   MT: EBER      Name:     SAUL OATES   MRN:      [id number]        Account:      [id number]   :      1932           Service Date: 2017      Document: [id number]

## 2017-05-21 DIAGNOSIS — I10 ESSENTIAL HYPERTENSION: ICD-10-CM

## 2017-05-22 NOTE — TELEPHONE ENCOUNTER
Losartan     Last Written Prescription Date: 03/03/16  Last Fill Quantity: 90, # refills: 3  [place] [facility] or [facility] prescribing provider: 04/10/17       Potassium   Date Value Ref Range Status   11/14/2016 4.4 3.4 - 5.3 mmol/L Final     Creatinine   Date Value Ref Range Status   11/14/2016 1.79 (H) 0.66 - 1.25 mg/dL Final     BP Readings from Last 3 Encounters:   05/17/17 126/68   04/10/17 122/59   03/28/17 107/63

## 2017-05-25 RX ORDER — LOSARTAN POTASSIUM 50 MG/1
TABLET ORAL
Qty: 90 TABLET | Refills: 0 | Status: SHIPPED | OUTPATIENT
Start: 2017-05-25 | End: 2017-12-19

## 2017-06-07 ENCOUNTER — TELEPHONE (OUTPATIENT)
Dept: FAMILY MEDICINE | Facility: CLINIC | Age: 82
End: 2017-06-07

## 2017-06-08 ENCOUNTER — MEDICAL CORRESPONDENCE (OUTPATIENT)
Dept: HEALTH INFORMATION MANAGEMENT | Facility: CLINIC | Age: 82
End: 2017-06-08

## 2017-06-08 NOTE — TELEPHONE ENCOUNTER
[facility] - diabetic shoe order form completed and routed to Dr. Trejo for review and signature.

## 2017-07-19 ENCOUNTER — TELEPHONE (OUTPATIENT)
Dept: FAMILY MEDICINE | Facility: CLINIC | Age: 82
End: 2017-07-19

## 2017-07-19 NOTE — TELEPHONE ENCOUNTER
Received fax from [facility] pharmacy for lidocaine ointment, called patient to see if he ordered medication from [facility] pharmacy, patient denies request and he says this is not him.   Faxed finding to pharmacy; denying script.     Farrah PINO  Station

## 2017-07-21 ENCOUNTER — MEDICAL CORRESPONDENCE (OUTPATIENT)
Dept: HEALTH INFORMATION MANAGEMENT | Facility: CLINIC | Age: 82
End: 2017-07-21

## 2017-07-31 ENCOUNTER — DOCUMENTATION ONLY (OUTPATIENT)
Dept: OTHER | Facility: CLINIC | Age: 82
End: 2017-07-31

## 2017-07-31 DIAGNOSIS — Z71.89 ADVANCED DIRECTIVES, COUNSELING/DISCUSSION: Chronic | ICD-10-CM

## 2017-08-03 ENCOUNTER — RADIANT APPOINTMENT (OUTPATIENT)
Dept: GENERAL RADIOLOGY | Facility: CLINIC | Age: 82
End: 2017-08-03
Attending: FAMILY MEDICINE
Payer: COMMERCIAL

## 2017-08-03 ENCOUNTER — OFFICE VISIT (OUTPATIENT)
Dept: FAMILY MEDICINE | Facility: CLINIC | Age: 82
End: 2017-08-03
Payer: COMMERCIAL

## 2017-08-03 VITALS
HEART RATE: 72 BPM | SYSTOLIC BLOOD PRESSURE: 126 MMHG | TEMPERATURE: 98.7 F | HEIGHT: 66 IN | WEIGHT: 220 LBS | DIASTOLIC BLOOD PRESSURE: 67 MMHG | BODY MASS INDEX: 35.36 KG/M2

## 2017-08-03 DIAGNOSIS — M25.562 ACUTE PAIN OF BOTH KNEES: ICD-10-CM

## 2017-08-03 DIAGNOSIS — E11.29 CONTROLLED TYPE 2 DIABETES MELLITUS WITH OTHER DIABETIC KIDNEY COMPLICATION, WITHOUT LONG-TERM CURRENT USE OF INSULIN (H): Primary | ICD-10-CM

## 2017-08-03 DIAGNOSIS — M54.2 CERVICALGIA: ICD-10-CM

## 2017-08-03 DIAGNOSIS — M25.561 ACUTE PAIN OF BOTH KNEES: ICD-10-CM

## 2017-08-03 LAB
ANION GAP SERPL CALCULATED.3IONS-SCNC: 6 MMOL/L (ref 3–14)
BUN SERPL-MCNC: 27 MG/DL (ref 7–30)
CALCIUM SERPL-MCNC: 9.4 MG/DL (ref 8.5–10.1)
CHLORIDE SERPL-SCNC: 106 MMOL/L (ref 94–109)
CO2 SERPL-SCNC: 24 MMOL/L (ref 20–32)
CREAT SERPL-MCNC: 1.39 MG/DL (ref 0.66–1.25)
GFR SERPL CREATININE-BSD FRML MDRD: 49 ML/MIN/1.7M2
GLUCOSE SERPL-MCNC: 126 MG/DL (ref 70–99)
HBA1C MFR BLD: 6.3 % (ref 4.3–6)
POTASSIUM SERPL-SCNC: 4.2 MMOL/L (ref 3.4–5.3)
SODIUM SERPL-SCNC: 136 MMOL/L (ref 133–144)

## 2017-08-03 PROCEDURE — 99214 OFFICE O/P EST MOD 30 MIN: CPT | Performed by: FAMILY MEDICINE

## 2017-08-03 PROCEDURE — 72040 X-RAY EXAM NECK SPINE 2-3 VW: CPT

## 2017-08-03 PROCEDURE — 36415 COLL VENOUS BLD VENIPUNCTURE: CPT | Performed by: FAMILY MEDICINE

## 2017-08-03 PROCEDURE — 83036 HEMOGLOBIN GLYCOSYLATED A1C: CPT | Performed by: FAMILY MEDICINE

## 2017-08-03 PROCEDURE — 73565 X-RAY EXAM OF KNEES: CPT

## 2017-08-03 PROCEDURE — 80048 BASIC METABOLIC PNL TOTAL CA: CPT | Performed by: FAMILY MEDICINE

## 2017-08-03 NOTE — LETTER
Derrick Silva  [address]   [place] [address]    August 4, 2017    Dear ,    Metabolic panel showed improved kidney function.  Hemoglobin A1c is slightly higher than before but still within goal.    Continue all medications unchanged.  Be more consistent with carbohydrate controlled diet; have smaller portions of starchy food, and minimize sweets in diet.  Exercise as tolerated.    Repeat A1c in 3 months.      Resulted Orders   HEMOGLOBIN A1C   Result Value Ref Range    Hemoglobin A1C 6.3 (H) 4.3 - 6.0 %   Basic metabolic panel   Result Value Ref Range    Sodium 136 133 - 144 mmol/L    Potassium 4.2 3.4 - 5.3 mmol/L      Comment:      Specimen slightly hemolyzed, potassium may be falsely elevated    Chloride 106 94 - 109 mmol/L    Carbon Dioxide 24 20 - 32 mmol/L    Anion Gap 6 3 - 14 mmol/L    Glucose 126 (H) 70 - 99 mg/dL    Urea Nitrogen 27 7 - 30 mg/dL    Creatinine 1.39 (H) 0.66 - 1.25 mg/dL    GFR Estimate 49 (L) >60 mL/min/1.7m2      Comment:      Non  GFR Calc    GFR Estimate If Black 59 (L) >60 mL/min/1.7m2      Comment:       GFR Calc    Calcium 9.4 8.5 - 10.1 mg/dL               If you have any questions or concerns, please call the clinic at the number listed above.        Sincerely,        Stefan Trejo MD

## 2017-08-03 NOTE — PATIENT INSTRUCTIONS
Xrays for neck and knees today to look at joints and bones.  Schedule physical therapy at your preferred site close your residence.  Acetaminophen 325 mg orally 1-2 tabs every 4-6 hrs as needed for pain.  Try to sleep on your bed at night rather than recliner as it may restrict your movement and position.    Go to [facility] now for your blood draw.  You will be contacted in 1-2 days for results of your lab tests.  Further recommendation for diabetes management to be given then.      Healthy Meals for Diabetes     A healthcare provider will help you develop a meal plan that fits your needs.   Ask your healthcare team to help you make a meal plan that fits your needs. Your meal plan tells you when to eat your meals and snacks, what kinds of foods to eat, and how much of each food to eat. You don t have to give up all the foods you like. But you do need to follow some guidelines.  Choose healthy carbohydrates  Starches, sugars, and fiber are all types of carbohydrates. Fiber can help lower your cholesterol and triglycerides. Fiber is also healthy for your heart. You should have 20 to 35 grams of total fiber each day. Fiber-rich foods include:    Whole-grain breads and cereals    Bulgur wheat    Brown rice       Whole-wheat pasta    Fruits and vegetables    Dry beans, and peas   Keep track of the amount of carbohydrates you eat. This can help you keep the right balance of physical activity and medicine. The amount of carbohydrates needed will vary for each person. It depends on many things such as your health, the medicines you take, and how active you are. Your healthcare team will help you figure out the right amount of carbohydrates for you. You may start with around 45 to 60 grams of carbohydrates per meal, depending on your situation.   Here are some examples of foods containing about 15 grams of carbohydrates (1 serving of carbohydrates):    1/2 cup of canned or frozen fruit    A small piece of fresh fruit (4  ounces)    1 slice of bread    1/2 cup of oatmeal    1/3 cup of rice    4 to 6 crackers    1/2 English muffin    1/2 cup of black beans    1/4 of a large baked potato (3 ounces)    2/3 cup of plain fat-free yogurt    1 cup of soup    1/2 cup of casserole    6 chicken nuggets    2-inch-square brownie or cake without frosting    2 small cookies    1/2 cup of ice cream or sherbet  Choose healthy protein foods  Eating protein that is low in fat can help you control your weight. It also helps keep your heart healthy. Low-fat protein foods include:    Fish    Plant proteins, such as dry beans and peas, nuts, and soy products like tofu and soymilk    Lean meat with all visible fat removed    Poultry with the skin removed    Low-fat or nonfat milk, cheese, and yogurt  Limit unhealthy fats and sugar  Saturated and trans fats are unhealthy for your heart. They raise LDL (bad) cholesterol. Fat is also high in calories, so it can make you gain weight. To cut down on unhealthy fats and sugar, limit these foods:    Butter or margarine    Palm and palm kernel oils and coconut oil    Cream    Cheese    Ziegler    Lunch meats       Ice cream    Sweet bakery goods such as pies, muffins, and donuts    Jams and jellies    Candy bars    Regular sodas   How much to eat  The amount of food you eat affects your blood sugar. It also affects your weight. Your healthcare team will tell you how much of each type of food you should eat.    Use measuring cups and spoons and a food scale to measure serving sizes.    Learn what a correct serving size looks like on your plate. This will help when you are away from home and can t measure your servings.    Eat only the number of servings given on your meal plan for each food. Don t take seconds.    Learn to read food labels. Be sure to look at serving size, total carbohydrates, fiber, calories, sugar, and saturated and trans fats. Look for healthier alternatives to foods that have added sugar.    Plan  ahead for parties so you can still have a good time without going overboard with unhealthy food choices. Set a good example yourself by bringing a healthy dish to pot lucks.   Choose healthy snacks  When it comes to snacks, we usually think about foods with added sugar and fats. But there are many other options for healthier snack choices. Here are a few snack ideas to choose from:  Snacks with less than 5 grams of carbohydrates    1 piece of string cheese    3 celery sticks plus 1 tablespoon of peanut butter    5 cherry tomatoes plus 1 tablespoon of ranch dressing    1 hard-boiled egg    1/4 cup of fresh blueberries     5 baby carrots    1 cup of light popcorn    1/2 cup of sugar-free gelatin    15 almonds  Snacks with about 10 to 20 grams of carbohydrates    1/3 cup of hummus plus 1 cup of fresh cut nonstarchy vegetables (carrots, green peppers, broccoli, celery, or a combination)    1/2 cup of fresh or canned fruit plus 1/4 cup of cottage cheese    1/2 cup of tuna salad with 4 crackers    2 rice cakes and a tablespoon of peanut butter    1 small apple or orange    3 cups light popcorn    1/2 of a turkey sandwich (1 slice of whole-wheat bread, 2 ounces of turkey, and mustard)  Portion sizes are important to controlling your blood sugar and staying at a healthy weight. Stock up on healthy snack items so you always have them on hand.  When to eat  Your meal plan will likely include breakfast, lunch, dinner, and some snacks.    Try to eat your meals and snacks at about the same times each day.    Eat all your meals and snacks. Skipping a meal or snack can make your blood sugar drop too low. It can also cause you to eat too much at the next meal or snack. Then your blood sugar could get too high.  Date Last Reviewed: 7/1/2016 2000-2017 [employer]. [address] [place] [address], [place] [address]. All rights reserved. This information is not intended as a substitute for professional medical care. Always  follow your healthcare professional's instructions.

## 2017-08-03 NOTE — MR AVS SNAPSHOT
After Visit Summary   8/3/2017    Derrick Silva    MRN: [id number]           Patient Information     Date Of Birth          3/7/1932        Visit Information        Provider Department      8/3/2017 10:00 AM Stefan Trejo MD [facility]        Today's Diagnoses     Controlled type 2 diabetes mellitus with other diabetic kidney complication, without long-term current use of insulin (H)    -  1    Cervicalgia        Acute pain of both knees          Care Instructions    Xrays for neck and knees today to look at joints and bones.  Schedule physical therapy at your preferred site close your residence.  Acetaminophen 325 mg orally 1-2 tabs every 4-6 hrs as needed for pain.  Try to sleep on your bed at night rather than recliner as it may restrict your movement and position.    [facility] to [facility] now for your blood draw.  You will be contacted in 1-2 days for results of your lab tests.  Further recommendation for diabetes management to be given then.      Healthy Meals for Diabetes     A healthcare provider will help you develop a meal plan that fits your needs.   Ask your healthcare team to help you make a meal plan that fits your needs. Your meal plan tells you when to eat your meals and snacks, what kinds of foods to eat, and how much of each food to eat. You don t have to give up all the foods you like. But you do need to follow some guidelines.  Choose healthy carbohydrates  Starches, sugars, and fiber are all types of carbohydrates. Fiber can help lower your cholesterol and triglycerides. Fiber is also healthy for your heart. You should have 20 to 35 grams of total fiber each day. Fiber-rich foods include:    Whole-grain breads and cereals    Bulgur wheat    Brown rice       Whole-wheat pasta    Fruits and vegetables    Dry beans, and peas   Keep track of the amount of carbohydrates you eat. This can help you keep the right balance of physical activity and medicine. The  amount of carbohydrates needed will vary for each person. It depends on many things such as your health, the medicines you take, and how active you are. Your healthcare team will help you figure out the right amount of carbohydrates for you. You may start with around 45 to 60 grams of carbohydrates per meal, depending on your situation.   Here are some examples of foods containing about 15 grams of carbohydrates (1 serving of carbohydrates):    1/2 cup of canned or frozen fruit    A small piece of fresh fruit (4 ounces)    1 slice of bread    1/2 cup of oatmeal    1/3 cup of rice    4 to 6 crackers    1/2 English muffin    1/2 cup of black beans    1/4 of a large baked potato (3 ounces)    2/3 cup of plain fat-free yogurt    1 cup of soup    1/2 cup of casserole    6 chicken nuggets    2-inch-square brownie or cake without frosting    2 small cookies    1/2 cup of ice cream or sherbet  Choose healthy protein foods  Eating protein that is low in fat can help you control your weight. It also helps keep your heart healthy. Low-fat protein foods include:    Fish    Plant proteins, such as dry beans and peas, nuts, and soy products like tofu and soymilk    Lean meat with all visible fat removed    Poultry with the skin removed    Low-fat or nonfat milk, cheese, and yogurt  Limit unhealthy fats and sugar  Saturated and trans fats are unhealthy for your heart. They raise LDL (bad) cholesterol. Fat is also high in calories, so it can make you gain weight. To cut down on unhealthy fats and sugar, limit these foods:    Butter or margarine    Palm and palm kernel oils and coconut oil    Cream    Cheese    Ziegler    Lunch meats       Ice cream    Sweet bakery goods such as pies, muffins, and donuts    Jams and jellies    Candy bars    Regular sodas   How much to eat  The amount of food you eat affects your blood sugar. It also affects your weight. Your healthcare team will tell you how much of each type of food you should  eat.    Use measuring cups and spoons and a food scale to measure serving sizes.    Learn what a correct serving size looks like on your plate. This will help when you are away from home and can t measure your servings.    Eat only the number of servings given on your meal plan for each food. Don t take seconds.    Learn to read food labels. Be sure to look at serving size, total carbohydrates, fiber, calories, sugar, and saturated and trans fats. Look for healthier alternatives to foods that have added sugar.    Plan ahead for parties so you can still have a good time without going overboard with unhealthy food choices. Set a good example yourself by bringing a healthy dish to pot lucks.   Choose healthy snacks  When it comes to snacks, we usually think about foods with added sugar and fats. But there are many other options for healthier snack choices. Here are a few snack ideas to choose from:  Snacks with less than 5 grams of carbohydrates    1 piece of string cheese    3 celery sticks plus 1 tablespoon of peanut butter    5 cherry tomatoes plus 1 tablespoon of ranch dressing    1 hard-boiled egg    1/4 cup of fresh blueberries     5 baby carrots    1 cup of light popcorn    1/2 cup of sugar-free gelatin    15 almonds  Snacks with about 10 to 20 grams of carbohydrates    1/3 cup of hummus plus 1 cup of fresh cut nonstarchy vegetables (carrots, green peppers, broccoli, celery, or a combination)    1/2 cup of fresh or canned fruit plus 1/4 cup of cottage cheese    1/2 cup of tuna salad with 4 crackers    2 rice cakes and a tablespoon of peanut butter    1 small apple or orange    3 cups light popcorn    1/2 of a turkey sandwich (1 slice of whole-wheat bread, 2 ounces of turkey, and mustard)  Portion sizes are important to controlling your blood sugar and staying at a healthy weight. Stock up on healthy snack items so you always have them on hand.  When to eat  Your meal plan will likely include breakfast, lunch,  dinner, and some snacks.    Try to eat your meals and snacks at about the same times each day.    Eat all your meals and snacks. Skipping a meal or snack can make your blood sugar drop too low. It can also cause you to eat too much at the next meal or snack. Then your blood sugar could get too high.  Date Last Reviewed: 7/1/2016 2000-2017 [employer]. [address] [place] [address], [place] [address]. All rights reserved. This information is not intended as a substitute for professional medical care. Always follow your healthcare professional's instructions.                Follow-ups after your visit        Additional Services     PHYSICAL THERAPY REFERRAL       [facility]  [phone]                  Follow-up notes from your care team     Return if symptoms worsen or fail to improve.      Your next 10 appointments already scheduled     Aug 21, 2017  9:00 AM CDT   Remote PPM Check with [facility]   [facility] ([facility])    [address] [address] [phone]           This appointment is for a remote check of your pacemaker.  This is not an appointment at the office.              Who to contact     If you have questions or need follow up information about today's clinic visit or your schedule please contact [facility] directly at [phone].  Normal or non-critical lab and imaging results will be communicated to you by MyChart, letter or phone within 4 business days after the clinic has received the results. If you do not hear from us within 7 days, please contact the clinic through MyChart or phone. If you have a critical or abnormal lab result, we will notify you by phone as soon as possible.  Submit refill requests through [employer] or call your pharmacy and they will forward the refill request to us. Please allow 3 business days for your refill to be completed.           "Additional Information About Your Visit        MyChart Information     [employer] lets you send messages to your doctor, view your test results, renew your prescriptions, schedule appointments and more. To sign up, go to [email or web address] . Click on \"Log in\" on the left side of the screen, which will take you to the Welcome page. Then click on \"Sign up Now\" on the right side of the page.     You will be asked to enter the access code listed below, as well as some personal information. Please follow the directions to create your username and password.     Your access code is: [email or web address]  Expires: 2017 11:05 AM     Your access code will  in 90 days. If you need help or a new code, please call your [facility] clinic or [phone].        Care EveryWhere ID     This is your Care EveryWhere ID. This could be used by other organizations to access your [facility] medical records  [id number]        Your Vitals Were     Pulse Temperature Height BMI (Body Mass Index)          72 98.7  F (37.1  C) (Tympanic) 5' 6\" (1.676 m) 35.51 kg/m2         Blood Pressure from Last 3 Encounters:   17 126/67   17 126/68   04/10/17 122/59    Weight from Last 3 Encounters:   17 220 lb (99.8 kg)   17 206 lb (93.4 kg)   04/10/17 198 lb 3.2 oz (89.9 kg)              We Performed the Following     Basic metabolic panel     HEMOGLOBIN A1C     PHYSICAL THERAPY REFERRAL     XR Cervical Spine 2/3 Views     XR Knee AP Standing Bilateral          Today's Medication Changes          These changes are accurate as of: 8/3/17 11:05 AM.  If you have any questions, ask your nurse or doctor.               These medicines have changed or have updated prescriptions.        Dose/Directions    acetaminophen 325 MG tablet   Commonly known as:  TYLENOL   This may have changed:  Another medication with the same name was removed. Continue taking this medication, and follow the directions you see here.   Used for:  Cervicalgia "   Changed by:  Stefan Trejo MD        Dose:  325-650 mg   Take 325-650 mg by mouth every 6 hours as needed for mild pain Reported on 4/10/2017   Quantity:  100 tablet   Refills:  0                Primary Care Provider Office Phone # Fax #    Stefan Trejo [phone]       [facility]        [address] [address]        Equal Access to Services     EVELYN MACKENZIE : Hadii rosette ku hadasho Soomaali, waaxda luqadaha, qaybta kaalmada adeegyada, mahsa limaluis fernandoalyssia bucio . So [facility] [phone].    ATENCIÓN: Si phuong deleon, tiene a banerjee disposición servicios gratuitos de asistencia lingüística. Yimi al [phone].    We comply with applicable federal civil rights laws and [place] laws. We do not discriminate on the basis of race, color, national origin, age, disability sex, sexual orientation or gender identity.            Thank you!     Thank you for choosing [facility]  for your care. Our goal is always to provide you with excellent care. Hearing back from our patients is one way we can continue to improve our services. Please take a few minutes to complete the written survey that you may receive in the mail after your visit with us. Thank you!             Your Updated Medication List - Protect others around you: Learn how to safely use, store and throw away your medicines at www.disposemymeds.org.          This list is accurate as of: 8/3/17 11:05 AM.  Always use your most recent med list.                   Brand Name Dispense Instructions for use Diagnosis    acetaminophen 325 MG tablet    TYLENOL    100 tablet    Take 325-650 mg by mouth every 6 hours as needed for mild pain Reported on 4/10/2017    Cervicalgia       amLODIPine 10 MG tablet    NORVASC    90 tablet    Take 1 tablet (10 mg) by mouth daily    Essential hypertension with goal blood pressure less than 130/80       aspirin 81 MG tablet     30  tablet    Take 1 tablet (81 mg) by mouth daily    CAD (coronary artery disease)       blood glucose monitoring test strip    ACCU-CHEK GEOFFREY    100 each    Use to test blood sugars 1 times daily or as directed.    Type 2 diabetes, HbA1C goal < 8% (H)       docusate sodium 100 MG capsule    COLACE     Take 100 mg by mouth daily        glipiZIDE 5 MG 24 hr tablet    glipiZIDE XL    90 tablet    Take 1 tablet (5 mg) by mouth daily    Controlled type 2 diabetes mellitus with stage 3 chronic kidney disease, without long-term current use of insulin (H)       losartan 50 MG tablet    COZAAR    90 tablet    TAKE ONE TABLET BY MOUTH ONCE DAILY    Essential hypertension       menthol (Topical Analgesic) 2.5% 2.5 % Gel topical gel    BENGAY VANISHIN SCENT     Apply to neck twice daily as needed for pain.    Cervicalgia       metFORMIN 500 MG tablet    GLUCOPHAGE    180 tablet    Take 1 tablet (500 mg) by mouth 2 times daily (with meals)    Controlled type 2 diabetes mellitus with complication, without long-term current use of insulin (H)       metoprolol 50 MG 24 hr tablet    TOPROL-XL    135 tablet    Take 1 in AM (50 mg) and 1/2 in PM (25 mg)    Essential hypertension, benign       nitroGLYcerin 0.4 MG sublingual tablet    NITROSTAT    60 tablet    Place 1 tablet (0.4 mg) under the tongue every 5 minutes as needed    CAD (coronary artery disease)       order for DME     1 Units    Equipment being ordered: Thigh high compression stockings    Edema, Type 2 diabetes, HbA1C goal < 8% (H)       PEPCID 20 MG tablet   Generic drug:  famotidine      Take 20 mg by mouth as needed        sertraline 50 MG tablet    ZOLOFT    90 tablet    TAKE ONE TABLET BY MOUTH ONCE DAILY    Adjustment disorder with mixed anxiety and depressed mood       simvastatin 40 MG tablet    ZOCOR    90 tablet    Take 0.5 tablets (20 mg) by mouth At Bedtime    Hyperlipidemia LDL goal <100       tamsulosin 0.4 MG capsule    FLOMAX    90 capsule    Take 1 capsule  (0.4 mg) by mouth daily    Prostate cancer (H)       traMADol 50 MG tablet    ULTRAM     Take 50 mg by mouth 2 times daily as needed

## 2017-08-03 NOTE — NURSING NOTE
"Chief Complaint   Patient presents with     Neck Pain     Pt still having some ongoing neck pain.     Diabetes     f/u on diabetes     Musculoskeletal Problem     Pt has been having some pain behind his knees.       Initial /67  Pulse 72  Temp 98.7  F (37.1  C) (Tympanic)  Ht 5' 6\" (1.676 m)  Wt 220 lb (99.8 kg)  BMI 35.51 kg/m2 Estimated body mass index is 35.51 kg/(m^2) as calculated from the following:    Height as of this encounter: 5' 6\" (1.676 m).    Weight as of this encounter: 220 lb (99.8 kg).  Medication Reconciliation: complete  Shikha Caldwell CMA    "

## 2017-08-03 NOTE — PROGRESS NOTES
SUBJECTIVE:                                                    Derrick Silva is a 85 year old male who presents to clinic today for the following health issues:  Chief Complaint   Patient presents with     Neck Pain     Pt still having some ongoing neck pain.     Diabetes     f/u on diabetes     Musculoskeletal Problem     Pt has been having some pain behind his knees.       Diabetes Follow-up      Patient is checking blood sugars: 1-3 times a week, if high he checks a couple more times throughout the day    Diabetic concerns: other - blood sugars a little higher 120-140      Symptoms of hypoglycemia (low blood sugar): none     Paresthesias (numbness or burning in feet) or sores: Yes a little     Date of last diabetic eye exam: 1 yr ago        Amount of exercise or physical activity: twice per wk    Problems taking medications regularly: No    Medication side effects: none  Diet: diabetic and has cut back on breads and desserts over the past month due to weight gain    Musculoskeletal problem/pain      Duration: past couple wks, feels weak    Description  Location: behind knees    Intensity:  moderate, 6/10    Accompanying signs and symptoms: weakness of legs and swelling of ankles and feet    History  Previous similar problem: No  Previous evaluation:  none    Precipitating or alleviating factors:  Trauma or overuse: no   Aggravating factors include: sitting makes him worse, legs get tingly and weak, worse in the mornings after sleeping    Therapies tried and outcome: none    Neck Pain-ongoing       Duration: 3-4 months per patient.    Description:  Location: neck  Radiation: into the right neck and into the right shoulder    Intensity:  moderate, 5/10    Accompanying signs and symptoms: steady achiness    History (similar episodes/previous evaluation): None    Precipitating or alleviating factors: chiropractor    Therapies tried and outcome: pt has been seeing chiropractor twice per wk for past 3  months, now going once per wk for neck month - some relief per patient.    Patient states sometimes, pain becomes bad enough that he can't sleep.    Patient states he went to a health fair in the assisted living and was referred to the chiropractor.      Problem list and histories reviewed & adjusted, as indicated.  Additional history: as documented    Patient Active Problem List   Diagnosis     Osteoarthritis     Benign essential hypertension     Prostate cancer (H)     Adenomatous polyps     Onychia of toe     GERD (gastroesophageal reflux disease)     Arteriosclerotic heart disease     BPH (benign prostatic hyperplasia)     Hyperlipidemia LDL goal <100     Advance Care Planning     Aortic stenosis     Type 2 diabetes mellitus, controlled, with renal complications (H)     Diabetes mellitus type 2, controlled, with complications (H)     Obesity     Benign neoplasm     History of adenocarcinoma of prostate     Past Surgical History:   Procedure Laterality Date     ANGIOGRAM  2010     CARPAL TUNNEL RELEASE RT/LT  2005     COLONOSCOPY  2008     ESOPHAGOSCOPY, GASTROSCOPY, DUODENOSCOPY (EGD), COMBINED  10/17/2011    Procedure:COMBINED ESOPHAGOSCOPY, GASTROSCOPY, DUODENOSCOPY (EGD), BIOPSY SINGLE OR MULTIPLE; Surgeon:TOÑO TAYLOR; Location:[facility]     HEMORRHOIDECTOMY       IMPLANT PACEMAKER       RELEASE CARPAL TUNNEL  8/15/2014    Procedure: RELEASE CARPAL TUNNEL;  Surgeon: Thom Hill MD;  Location: [facility]       Social History   Substance Use Topics     Smoking status: Never Smoker     Smokeless tobacco: Never Used     Alcohol use Yes      Comment: rare     Family History   Problem Relation Age of Onset     HEART DISEASE Sister      HEART DISEASE Brother      HEART DISEASE Brother      Alcohol/Drug Brother      HEART DISEASE Sister      Neurologic Disorder Daughter      parkinson's         Current Outpatient Prescriptions   Medication Sig Dispense Refill     sertraline (ZOLOFT) 50 MG tablet TAKE ONE TABLET  BY MOUTH ONCE DAILY 90 tablet 3     losartan (COZAAR) 50 MG tablet TAKE ONE TABLET BY MOUTH ONCE DAILY 90 tablet 0     metFORMIN (GLUCOPHAGE) 500 MG tablet Take 1 tablet (500 mg) by mouth 2 times daily (with meals) 180 tablet 3     menthol, Topical Analgesic, 2.5% (BENGAY VANISHIN SCENT) 2.5 % GEL topical gel Apply to neck twice daily as needed for pain.       traMADol (ULTRAM) 50 MG tablet Take 50 mg by mouth 2 times daily as needed       acetaminophen (TYLENOL) 325 MG tablet Take 325-650 mg by mouth every 6 hours as needed for mild pain Reported on 4/10/2017 100 tablet 0     metoprolol (TOPROL-XL) 50 MG 24 hr tablet Take 1 in AM (50 mg) and 1/2 in PM (25 mg) 135 tablet 5     glipiZIDE (GLIPIZIDE XL) 5 MG 24 hr tablet Take 1 tablet (5 mg) by mouth daily 90 tablet 1     simvastatin (ZOCOR) 40 MG tablet Take 0.5 tablets (20 mg) by mouth At Bedtime 90 tablet 3     amLODIPine (NORVASC) 10 MG tablet Take 1 tablet (10 mg) by mouth daily 90 tablet 3     aspirin 81 MG tablet Take 1 tablet (81 mg) by mouth daily 30 tablet      docusate sodium (COLACE) 100 MG capsule Take 100 mg by mouth daily        nitroglycerin (NITROSTAT) 0.4 MG sublingual tablet Place 1 tablet (0.4 mg) under the tongue every 5 minutes as needed 60 tablet 0     famotidine (PEPCID) 20 MG tablet Take 20 mg by mouth as needed       tamsulosin (FLOMAX) 0.4 MG 24 hr capsule Take 1 capsule (0.4 mg) by mouth daily (Patient not taking: Reported on 8/3/2017) 90 capsule 3     blood glucose monitoring (ACCU-CHEK GEOFFREY) test strip Use to test blood sugars 1 times daily or as directed. 100 each 2     ORDER FOR DME Equipment being ordered: Thigh high compression stockings 1 Units 1     Allergies   Allergen Reactions     Nkda [No Known Drug Allergies]          Reviewed and updated as needed this visit by clinical staffTobacco  Allergies  Meds  Problems  Med Hx  Surg Hx  Fam Hx  Soc Hx        Reviewed and updated as needed this visit by Provider  Allergies  Meds  " Problems         ROS:  C: NEGATIVE for fever, chills, change in weight  I: NEGATIVE for worrisome rashes, moles or lesions  E: NEGATIVE for vision changes or irritation  E/M: NEGATIVE for ear, mouth and throat problems  R: NEGATIVE for significant cough or SOB  CV: NEGATIVE for chest pain, palpitations or peripheral edema  GI: NEGATIVE for nausea, abdominal pain, heartburn, or change in bowel habits  : NEGATIVE for frequency, dysuria, or hematuria  MUSCULOSKELETAL: see above  N: NEGATIVE for weakness, dizziness or paresthesias  E: NEGATIVE for temperature intolerance, skin/hair changes  H: NEGATIVE for bleeding problems  P: NEGATIVE for changes in mood or affect    OBJECTIVE:                                                    /67  Pulse 72  Temp 98.7  F (37.1  C) (Tympanic)  Ht 5' 6\" (1.676 m)  Wt 220 lb (99.8 kg)  BMI 35.51 kg/m2  Body mass index is 35.51 kg/(m^2).  GENERAL: morbidly obese,, alert and no distress, ambulatory w/o assist  EYES: no icterus; pink conjunctivae.  NECK: supple but with limited passive range of motion due to pain and tightness; no palpable mass.  RESP: lungs clear to auscultation - no rales, no rhonchi, no wheezes  CV: regular rates and rhythm, no murmur  MS: no edema today  BILAT LE: no deformity; no palpable mass or tenderness; full range of motion of all joints with mild knee crepitations; no knee tenderness on palpation.  SKIN: no suspicious lesions, no rashes  NEURO: strength and tone- normal, sensory exam- grossly normal, mentation- intact, speech- normal, reflexes- symmetric  ABD:  nontender  FOOT EXAM: no ulcer/erythema; pedal pulses +1 bilaterally; monofilament: no deficit    Diagnostic test results:  Diagnostic Test Results:  Results for orders placed or performed in visit on 08/03/17   HEMOGLOBIN A1C   Result Value Ref Range    Hemoglobin A1C 6.3 (H) 4.3 - 6.0 %   Basic metabolic panel   Result Value Ref Range    Sodium 136 133 - 144 mmol/L    Potassium 4.2 3.4 - " 5.3 mmol/L    Chloride 106 94 - 109 mmol/L    Carbon Dioxide 24 20 - 32 mmol/L    Anion Gap 6 3 - 14 mmol/L    Glucose 126 (H) 70 - 99 mg/dL    Urea Nitrogen 27 7 - 30 mg/dL    Creatinine 1.39 (H) 0.66 - 1.25 mg/dL    GFR Estimate 49 (L) >60 mL/min/1.7m2    GFR Estimate If Black 59 (L) >60 mL/min/1.7m2    Calcium 9.4 8.5 - 10.1 mg/dL          ASSESSMENT/PLAN:                                                      ICD-10-CM    1. Controlled type 2 diabetes mellitus with other diabetic kidney complication, without long-term current use of insulin (H) E11.29 HEMOGLOBIN A1C     Basic metabolic panel  Will keep current dose of med.  Reinforced carb-controlled diet - printed in AVS for his reference.  Exercise as tolerated.  Adjust meds depending on results as appropriate.     2. Cervicalgia M54.2 XR Cervical Spine 2/3 Views     PHYSICAL THERAPY REFERRAL  No red flags. No sign of radiculopathy.  R/o DJD.  Patient requested referral to [facility] across from his home in [place].     3. Acute pain of both knees M25.561 XR Knee AP Standing Bilateral    M25.562 PHYSICAL THERAPY REFERRAL  DDx: osteoarthritis, malpositioning using a recliner, strain.  Await xray report and treat accordingly.  Acetaminophen 325 mg orally 1-2 tabs every 4-6 hrs as needed for pain  Symptomatic measures discussed in detail.       Follow up with Provider - 3 months or prn   Patient Instructions     Xrays for neck and knees today to look at joints and bones.  Schedule physical therapy at your preferred site close your residence.  Acetaminophen 325 mg orally 1-2 tabs every 4-6 hrs as needed for pain.  Try to sleep on your bed at night rather than recliner as it may restrict your movement and position.    [facility] for your blood draw.  You will be contacted in 1-2 days for results of your lab tests.  Further recommendation for diabetes management to be given then.      Healthy Meals for Diabetes     A healthcare provider  will help you develop a meal plan that fits your needs.   Ask your healthcare team to help you make a meal plan that fits your needs. Your meal plan tells you when to eat your meals and snacks, what kinds of foods to eat, and how much of each food to eat. You don t have to give up all the foods you like. But you do need to follow some guidelines.  Choose healthy carbohydrates  Starches, sugars, and fiber are all types of carbohydrates. Fiber can help lower your cholesterol and triglycerides. Fiber is also healthy for your heart. You should have 20 to 35 grams of total fiber each day. Fiber-rich foods include:    Whole-grain breads and cereals    Bulgur wheat    Brown rice       Whole-wheat pasta    Fruits and vegetables    Dry beans, and peas   Keep track of the amount of carbohydrates you eat. This can help you keep the right balance of physical activity and medicine. The amount of carbohydrates needed will vary for each person. It depends on many things such as your health, the medicines you take, and how active you are. Your healthcare team will help you figure out the right amount of carbohydrates for you. You may start with around 45 to 60 grams of carbohydrates per meal, depending on your situation.   Here are some examples of foods containing about 15 grams of carbohydrates (1 serving of carbohydrates):    1/2 cup of canned or frozen fruit    A small piece of fresh fruit (4 ounces)    1 slice of bread    1/2 cup of oatmeal    1/3 cup of rice    4 to 6 crackers    1/2 English muffin    1/2 cup of black beans    1/4 of a large baked potato (3 ounces)    2/3 cup of plain fat-free yogurt    1 cup of soup    1/2 cup of casserole    6 chicken nuggets    2-inch-square brownie or cake without frosting    2 small cookies    1/2 cup of ice cream or sherbet  Choose healthy protein foods  Eating protein that is low in fat can help you control your weight. It also helps keep your heart healthy. Low-fat protein foods  include:    Fish    Plant proteins, such as dry beans and peas, nuts, and soy products like tofu and soymilk    Lean meat with all visible fat removed    Poultry with the skin removed    Low-fat or nonfat milk, cheese, and yogurt  Limit unhealthy fats and sugar  Saturated and trans fats are unhealthy for your heart. They raise LDL (bad) cholesterol. Fat is also high in calories, so it can make you gain weight. To cut down on unhealthy fats and sugar, limit these foods:    Butter or margarine    Palm and palm kernel oils and coconut oil    Cream    Cheese    Ziegler    Lunch meats       Ice cream    Sweet bakery goods such as pies, muffins, and donuts    Jams and jellies    Candy bars    Regular sodas   How much to eat  The amount of food you eat affects your blood sugar. It also affects your weight. Your healthcare team will tell you how much of each type of food you should eat.    Use measuring cups and spoons and a food scale to measure serving sizes.    Learn what a correct serving size looks like on your plate. This will help when you are away from home and can t measure your servings.    Eat only the number of servings given on your meal plan for each food. Don t take seconds.    Learn to read food labels. Be sure to look at serving size, total carbohydrates, fiber, calories, sugar, and saturated and trans fats. Look for healthier alternatives to foods that have added sugar.    Plan ahead for parties so you can still have a good time without going overboard with unhealthy food choices. Set a good example yourself by bringing a healthy dish to pot lucks.   Choose healthy snacks  When it comes to snacks, we usually think about foods with added sugar and fats. But there are many other options for healthier snack choices. Here are a few snack ideas to choose from:  Snacks with less than 5 grams of carbohydrates    1 piece of string cheese    3 celery sticks plus 1 tablespoon of peanut butter    5 cherry tomatoes plus  1 tablespoon of ranch dressing    1 hard-boiled egg    1/4 cup of fresh blueberries     5 baby carrots    1 cup of light popcorn    1/2 cup of sugar-free gelatin    15 almonds  Snacks with about 10 to 20 grams of carbohydrates    1/3 cup of hummus plus 1 cup of fresh cut nonstarchy vegetables (carrots, green peppers, broccoli, celery, or a combination)    1/2 cup of fresh or canned fruit plus 1/4 cup of cottage cheese    1/2 cup of tuna salad with 4 crackers    2 rice cakes and a tablespoon of peanut butter    1 small apple or orange    3 cups light popcorn    1/2 of a turkey sandwich (1 slice of whole-wheat bread, 2 ounces of turkey, and mustard)  Portion sizes are important to controlling your blood sugar and staying at a healthy weight. Stock up on healthy snack items so you always have them on hand.  When to eat  Your meal plan will likely include breakfast, lunch, dinner, and some snacks.    Try to eat your meals and snacks at about the same times each day.    Eat all your meals and snacks. Skipping a meal or snack can make your blood sugar drop too low. It can also cause you to eat too much at the next meal or snack. Then your blood sugar could get too high.  Date Last Reviewed: 7/1/2016 2000-2017 [employer]. [address] [place] [address], [place] [address]. All rights reserved. This information is not intended as a substitute for professional medical care. Always follow your healthcare professional's instructions.            Stefan Trejo MD  [facility]

## 2017-08-21 ENCOUNTER — DOCUMENTATION ONLY (OUTPATIENT)
Dept: CARDIOLOGY | Facility: CLINIC | Age: 82
End: 2017-08-21

## 2017-08-21 ENCOUNTER — HOSPITAL ENCOUNTER (OUTPATIENT)
Dept: CARDIOLOGY | Facility: CLINIC | Age: 82
Discharge: HOME | End: 2017-08-21
Attending: INTERNAL MEDICINE | Admitting: INTERNAL MEDICINE
Payer: MEDICARE

## 2017-08-21 PROCEDURE — 93296 REM INTERROG EVL PM/IDS: CPT

## 2017-08-21 PROCEDURE — 93294 REM INTERROG EVL PM/LDLS PM: CPT | Performed by: INTERNAL MEDICINE

## 2017-08-21 NOTE — PROGRESS NOTES
Latitude NXT Remote PPM Device Check  AP: 1 %      : 100 %  Mode: DDD        Presenting Rhythm: AS/  Heart Rate: Adequate per the histograms.  Sensing: Stable    Pacing Threshold: Stable    Impedance: Stable  Battery Status: 9 yrs remain.  Atrial Arrhythmia: 5 PMT episodes, 3 w/ EGMs.  As=Vs. Pt taking ASA 81 mg q d. This is not a new finding.  Ventricular Arrhythmia: None.     Care Plan: Annual thresholds due in October. Pt called with results.

## 2017-10-02 DIAGNOSIS — E11.29 TYPE 2 DIABETES MELLITUS, CONTROLLED, WITH RENAL COMPLICATIONS (H): ICD-10-CM

## 2017-10-02 NOTE — TELEPHONE ENCOUNTER
metformin         Last Written Prescription Date: 01/11/17  Last Fill Quantity: 180, # refills: 1  Last Office Visit with [facility] or [facility] prescribing provider:  08/03/*17        BP Readings from Last 3 Encounters:   08/03/17 126/67   05/17/17 126/68   04/10/17 122/59     Lab Results   Component Value Date    MICROL 31 09/19/2016     Lab Results   Component Value Date    UMALCR 35.86 09/19/2016     Creatinine   Date Value Ref Range Status   08/03/2017 1.39 (H) 0.66 - 1.25 mg/dL Final   ]  GFR Estimate   Date Value Ref Range Status   08/03/2017 49 (L) >60 mL/min/1.7m2 Final     Comment:     Non  GFR Calc   11/14/2016 36 (L) >60 mL/min/1.7m2 Final     Comment:     Non  GFR Calc   11/10/2016 38 (L) >60 mL/min/1.7m2 Final     Comment:     Non  GFR Calc     GFR Estimate If Black   Date Value Ref Range Status   08/03/2017 59 (L) >60 mL/min/1.7m2 Final     Comment:      GFR Calc   11/14/2016 44 (L) >60 mL/min/1.7m2 Final     Comment:      GFR Calc   11/10/2016 45 (L) >60 mL/min/1.7m2 Final     Comment:      GFR Calc     Lab Results   Component Value Date    CHOL 158 09/19/2016     Lab Results   Component Value Date    HDL 37 09/19/2016     Lab Results   Component Value Date    LDL 85 09/19/2016     Lab Results   Component Value Date    TRIG 178 09/19/2016     Lab Results   Component Value Date    CHOLHDLRATIO 4.3 04/13/2015     Lab Results   Component Value Date    AST 20 11/10/2016     Lab Results   Component Value Date    ALT 37 11/10/2016     Lab Results   Component Value Date    A1C 6.3 08/03/2017    A1C 5.8 03/28/2017    A1C 5.8 09/19/2016    A1C 6.0 07/12/2016    A1C 6.0 03/14/2016     Potassium   Date Value Ref Range Status   08/03/2017 4.2 3.4 - 5.3 mmol/L Final     Comment:     Specimen slightly hemolyzed, potassium may be falsely elevated

## 2017-10-10 ENCOUNTER — OFFICE VISIT (OUTPATIENT)
Dept: FAMILY MEDICINE | Facility: CLINIC | Age: 82
End: 2017-10-10
Payer: COMMERCIAL

## 2017-10-10 VITALS
DIASTOLIC BLOOD PRESSURE: 74 MMHG | BODY MASS INDEX: 34.07 KG/M2 | HEART RATE: 81 BPM | WEIGHT: 212 LBS | OXYGEN SATURATION: 98 % | SYSTOLIC BLOOD PRESSURE: 143 MMHG | HEIGHT: 66 IN | TEMPERATURE: 97.5 F | RESPIRATION RATE: 16 BRPM

## 2017-10-10 DIAGNOSIS — C61 PROSTATE CANCER (H): ICD-10-CM

## 2017-10-10 DIAGNOSIS — M25.562 BILATERAL CHRONIC KNEE PAIN: Primary | ICD-10-CM

## 2017-10-10 DIAGNOSIS — M25.561 BILATERAL CHRONIC KNEE PAIN: Primary | ICD-10-CM

## 2017-10-10 DIAGNOSIS — G89.29 BILATERAL CHRONIC KNEE PAIN: Primary | ICD-10-CM

## 2017-10-10 DIAGNOSIS — Z23 NEED FOR PROPHYLACTIC VACCINATION AND INOCULATION AGAINST INFLUENZA: ICD-10-CM

## 2017-10-10 PROCEDURE — 99213 OFFICE O/P EST LOW 20 MIN: CPT | Mod: 25 | Performed by: FAMILY MEDICINE

## 2017-10-10 PROCEDURE — 90662 IIV NO PRSV INCREASED AG IM: CPT | Performed by: FAMILY MEDICINE

## 2017-10-10 PROCEDURE — G0008 ADMIN INFLUENZA VIRUS VAC: HCPCS | Performed by: FAMILY MEDICINE

## 2017-10-10 NOTE — TELEPHONE ENCOUNTER
tamsulosin (FLOMAX) 0.4 MG 24 hr capsule         Last Written Prescription Date: 7/28/16  Last Fill Quantity: 90, # refills: 3    Last Office Visit with [facility], [facility] or [facility] prescribing provider:  8/3/17   Future Office Visit:    Next 5 appointments (look out 90 days)     Oct 10, 2017  1:00 PM CDT   [facility] with Stefan Trejo MD   [facility]    [address] [id number]   [phone]                  BP Readings from Last 3 Encounters:   08/03/17 126/67   05/17/17 126/68   04/10/17 122/59

## 2017-10-10 NOTE — PROGRESS NOTES
Injectable Influenza Immunization Documentation    1.  Is the person to be vaccinated sick today?   No    2. Does the person to be vaccinated have an allergy to a component   of the vaccine?   No    3. Has the person to be vaccinated ever had a serious reaction   to influenza vaccine in the past?   No    4. Has the person to be vaccinated ever had Guillain-Barré syndrome?   No    Form completed by Shikha Caldwell CMA

## 2017-10-10 NOTE — PROGRESS NOTES
SUBJECTIVE:   Derrick Silva is a 85 year old male who presents to clinic today for the following health issues:      Joint Pain    Onset: 1-2 months    Description:   Location: pain started behind both legs after patient started seeing a chiropractor for neck and back pain but not knee pain  Character: Burning and tender to touch. Both legs have given out since it started.    Patient states pain is worst after he has been sitting stationary for an extended period, and gets somewhat better that he can walk more comfortably as he walks around more.  Stopped the rower machine exercise as it has increased pain when bending-extending knee repetitively.  Patient lives in an assisted living residence with his wife who uses a wheelchair; he actually pushes her wheelchair around when they need to move around.      Intensity: Sitting in wheelchair it is fine, however, when patient starts to straighten his leg, that's when pain starts up.    Progression of Symptoms: worse    Accompanying Signs & Symptoms:  Other symptoms: ankles seem somewhat swollen; no radiation of pain except around knee area.    History:   Previous similar pain: no       Precipitating factors:   Trauma or overuse: no     Alleviating factors:  Improved by: partial relief with progressive movement/walking.    Therapies Tried and outcome: tries to rest legs for now; Aleve OTC 1-2 tablets at night - partial relief; heat behind legs - decreases pain.    Verified above history with patient.    Patient is not seeing physical therapist at assisted living.      Problem list and histories reviewed & adjusted, as indicated.  Additional history: as documented    Patient Active Problem List   Diagnosis     Osteoarthritis     Benign essential hypertension     Prostate cancer (H)     Adenomatous polyps     Onychia of toe     GERD (gastroesophageal reflux disease)     Arteriosclerotic heart disease     BPH (benign prostatic hyperplasia)     Hyperlipidemia LDL  goal <100     Advance Care Planning     Aortic stenosis     Type 2 diabetes mellitus, controlled, with renal complications (H)     Diabetes mellitus type 2, controlled, with complications (H)     Obesity     Benign neoplasm     History of adenocarcinoma of prostate     Past Surgical History:   Procedure Laterality Date     ANGIOGRAM  2010     CARPAL TUNNEL RELEASE RT/LT  2005     COLONOSCOPY  2008     ESOPHAGOSCOPY, GASTROSCOPY, DUODENOSCOPY (EGD), COMBINED  10/17/2011    Procedure:COMBINED ESOPHAGOSCOPY, GASTROSCOPY, DUODENOSCOPY (EGD), BIOPSY SINGLE OR MULTIPLE; Surgeon:TOÑO TAYLOR; Location:[facility]     HEMORRHOIDECTOMY       IMPLANT PACEMAKER       RELEASE CARPAL TUNNEL  8/15/2014    Procedure: RELEASE CARPAL TUNNEL;  Surgeon: Thom Hill MD;  Location: [facility]       Social History   Substance Use Topics     Smoking status: Never Smoker     Smokeless tobacco: Never Used     Alcohol use Yes      Comment: rare     Family History   Problem Relation Age of Onset     HEART DISEASE Sister      HEART DISEASE Brother      HEART DISEASE Brother      Alcohol/Drug Brother      HEART DISEASE Sister      Neurologic Disorder Daughter      parkinson's         Current Outpatient Prescriptions   Medication Sig Dispense Refill     order for DME Four- wheeled walker 1 Units 0     sertraline (ZOLOFT) 50 MG tablet TAKE ONE TABLET BY MOUTH ONCE DAILY 90 tablet 3     losartan (COZAAR) 50 MG tablet TAKE ONE TABLET BY MOUTH ONCE DAILY 90 tablet 0     metFORMIN (GLUCOPHAGE) 500 MG tablet Take 1 tablet (500 mg) by mouth 2 times daily (with meals) 180 tablet 3     traMADol (ULTRAM) 50 MG tablet Take 50 mg by mouth 2 times daily as needed       metoprolol (TOPROL-XL) 50 MG 24 hr tablet Take 1 in AM (50 mg) and 1/2 in PM (25 mg) 135 tablet 5     glipiZIDE (GLIPIZIDE XL) 5 MG 24 hr tablet Take 1 tablet (5 mg) by mouth daily 90 tablet 1     famotidine (PEPCID) 20 MG tablet Take 20 mg by mouth as needed       tamsulosin (FLOMAX) 0.4 MG  "24 hr capsule Take 1 capsule (0.4 mg) by mouth daily 90 capsule 3     simvastatin (ZOCOR) 40 MG tablet Take 0.5 tablets (20 mg) by mouth At Bedtime 90 tablet 3     amLODIPine (NORVASC) 10 MG tablet Take 1 tablet (10 mg) by mouth daily 90 tablet 3     blood glucose monitoring (ACCU-CHEK GEOFFREY) test strip Use to test blood sugars 1 times daily or as directed. 100 each 2     aspirin 81 MG tablet Take 1 tablet (81 mg) by mouth daily 30 tablet      docusate sodium (COLACE) 100 MG capsule Take 100 mg by mouth daily        menthol, Topical Analgesic, 2.5% (BENGAY VANISHIN SCENT) 2.5 % GEL topical gel Apply to neck twice daily as needed for pain.       acetaminophen (TYLENOL) 325 MG tablet Take 325-650 mg by mouth every 6 hours as needed for mild pain Reported on 4/10/2017 100 tablet 0     nitroglycerin (NITROSTAT) 0.4 MG sublingual tablet Place 1 tablet (0.4 mg) under the tongue every 5 minutes as needed 60 tablet 0     ORDER FOR DME Equipment being ordered: Thigh high compression stockings 1 Units 1     Allergies   Allergen Reactions     Nkda [No Known Drug Allergies]          Reviewed and updated as needed this visit by clinical staffAllergies       Reviewed and updated as needed this visit by Provider         ROS:  C: NEGATIVE for fever, chills, change in weight  I: NEGATIVE for worrisome rashes, moles or lesions  MUSCULOSKELETAL:see above  N: NEGATIVE for weakness, dizziness or paresthesias  H: NEGATIVE for bleeding problems    OBJECTIVE:                                                    /74  Pulse 81  Temp 97.5  F (36.4  C) (Tympanic)  Resp 16  Ht 5' 6\" (1.676 m)  Wt 212 lb (96.2 kg)  SpO2 98%  BMI 34.22 kg/m2  Body mass index is 34.22 kg/(m^2).  GENERAL: alert and no distress, brought in via wheelchair but able to get up and ambulate with a cane  BILAT KNEES: no discoloration/swelling; full range of motion bilaterally with no pain/crepitation; mild lateral hamstring tendon tenderness on right, " otherwise no tenderness either knee; negative pain on valgus/varus stress; negative drawer tests bilaterally  SKIN: no suspicious lesions, no rashes    Diagnostic test results:  Diagnostic Test Results:  none        ASSESSMENT/PLAN:                                                      ICD-10-CM    1. Bilateral chronic knee pain M25.561 PHYSICAL THERAPY REFERRAL    M25.562 order for DME    G89.29 By hx, could be PFS vs hamstring tightness.  No acute or red flags on exam.  Discussed with patient no clear indication for imaging today - he concurs to so.  Advised physical therapy and its benefits - patient concurs and prefers to go to the phys tx clinic across his assisted living site for easy access.  Acetaminophen 325 mg orally 1-2 tabs every 4-6 hrs as needed for pain  Return precautions discussed and given to patient.     2. Need for prophylactic vaccination and inoculation against influenza Z23 FLU VACCINE, INCREASED ANTIGEN, PRESV FREE, AGE 65+ [02795]     ADMIN INFLUENZA (For MEDICARE Patients ONLY) []       Follow up with Provider - 6-8 weeks or prn   Patient Instructions   Schedule physical therapy with [facility] (site per your request).    Your knee pain appears to be related to tight hamstrings and deconditioning.    Acetaminophen 325 mg orally 1-2 tabs every 4-6 hrs as needed for pain.  If not enough relief,  May take Naproxen 220 mg orally with food every 12 hrs as needed for pain.    If with swelling of legs/knee, severe pain, redness of legs/joint or fever, see provider promptly.        Stefan Trejo MD  [facility]

## 2017-10-10 NOTE — MR AVS SNAPSHOT
"              After Visit Summary   10/10/2017    Derrick Silva    MRN: [id number]           Patient Information     Date Of Birth          3/7/1932        Visit Information        Provider Department      10/10/2017 1:00 PM Stefan Trejo MD [facility]        Today's Diagnoses     Bilateral chronic knee pain    -  1    Need for prophylactic vaccination and inoculation against influenza          Care Instructions    Schedule physical therapy with [facility] (site per your request).    Your knee pain appears to be related to tight hamstrings and deconditioning.    Acetaminophen 325 mg orally 1-2 tabs every 4-6 hrs as needed for pain.  If not enough relief,  May take Naproxen 220 mg orally with food every 12 hrs as needed for pain.    If with swelling of legs/knee, severe pain, redness of legs/joint or fever, see provider promptly.            Follow-ups after your visit        Additional Services     PHYSICAL THERAPY REFERRAL       [facility]  [phone]    *This therapy referral will be filtered to a centralized scheduling office at [facility] and the patient will receive a call to schedule an appointment at a [facility] location most convenient for them. *     **Note to Provider:  If you are referring outside of [facility] for the therapy appointment, please list the name of the location in the \"special instructions\" above, print the referral and give to the patient to schedule the appointment.                  Your next 10 appointments already scheduled     Oct 31, 2017 10:40 AM CDT   Pacemaker Check with [facility]   [facility] ([facility])    [address] [id number] [phone]              Who to contact     If you have questions or need follow up information about today's clinic visit or your schedule please contact [facility] " "[place] directly at [phone].  Normal or non-critical lab and imaging results will be communicated to you by MyChart, letter or phone within 4 business days after the clinic has received the results. If you do not hear from us within 7 days, please contact the clinic through [employer] or phone. If you have a critical or abnormal lab result, we will notify you by phone as soon as possible.  Submit refill requests through [employer] or call your pharmacy and they will forward the refill request to us. Please allow 3 business days for your refill to be completed.          Additional Information About Your Visit        [employer] Information     [employer] lets you send messages to your doctor, view your test results, renew your prescriptions, schedule appointments and more. To sign up, go to [email or web address] . Click on \"Log in\" on the left side of the screen, which will take you to the Welcome page. Then click on \"Sign up Now\" on the right side of the page.     You will be asked to enter the access code listed below, as well as some personal information. Please follow the directions to create your username and password.     Your access code is: [email or web address]  Expires: 2017 11:05 AM     Your access code will  in 90 days. If you need help or a new code, please call your [place] clinic or [phone].        Care EveryWhere ID     This is your Care EveryWhere ID. This could be used by other organizations to access your [facility] [place] medical records  [id number]        Your Vitals Were     Pulse Temperature Respirations Height Pulse Oximetry BMI (Body Mass Index)    81 97.5  F (36.4  C) (Tympanic) 16 5' 6\" (1.676 m) 98% 34.22 kg/m2       Blood Pressure from Last 3 Encounters:   10/10/17 143/74   17 126/67   17 126/68    Weight from Last 3 Encounters:   10/10/17 212 lb (96.2 kg)   17 220 lb (99.8 kg)   17 206 lb (93.4 kg)              We Performed the Following     ADMIN INFLUENZA (For " MEDICARE Patients ONLY) []     FLU VACCINE, INCREASED ANTIGEN, PRESV FREE, AGE 65+ [10830]     PHYSICAL THERAPY REFERRAL        Primary Care Provider Office Phone # Fax #    Stefan Trejo [phone] [address] [address]        Equal Access to Services     EVELYN MACKENZIE : Hadii aad ku hadasho Soomaali, waaxda luqadaha, qaybta kaalmada adeegyada, mahsa vela hayroderickn vika alinaalyssia bucio . So [facility] [phone].    ATENCIÓN: Si habla español, tiene a banerjee disposición servicios gratuitos de asistencia lingüística. Yimi al [phone].    We comply with applicable federal civil rights laws and [place] laws. We do not discriminate on the basis of race, color, national origin, age, disability, sex, sexual orientation, or gender identity.            Thank you!     Thank you for choosing [facility]  for your care. Our goal is always to provide you with excellent care. Hearing back from our patients is one way we can continue to improve our services. Please take a few minutes to complete the written survey that you may receive in the mail after your visit with us. Thank you!             Your Updated Medication List - Protect others around you: Learn how to safely use, store and throw away your medicines at www.disposemymeds.org.          This list is accurate as of: 10/10/17  2:04 PM.  Always use your most recent med list.                   Brand Name Dispense Instructions for use Diagnosis    acetaminophen 325 MG tablet    TYLENOL    100 tablet    Take 325-650 mg by mouth every 6 hours as needed for mild pain Reported on 4/10/2017    Cervicalgia       amLODIPine 10 MG tablet    NORVASC    90 tablet    Take 1 tablet (10 mg) by mouth daily    Essential hypertension with goal blood pressure less than 130/80       aspirin 81 MG tablet     30 tablet    Take 1 tablet (81 mg) by mouth daily    CAD (coronary artery disease)       blood glucose  monitoring test strip    ACCU-CHEK GEOFFREY    100 each    Use to test blood sugars 1 times daily or as directed.    Type 2 diabetes, HbA1C goal < 8% (H)       docusate sodium 100 MG capsule    COLACE     Take 100 mg by mouth daily        glipiZIDE 5 MG 24 hr tablet    glipiZIDE XL    90 tablet    Take 1 tablet (5 mg) by mouth daily    Controlled type 2 diabetes mellitus with stage 3 chronic kidney disease, without long-term current use of insulin (H)       losartan 50 MG tablet    COZAAR    90 tablet    TAKE ONE TABLET BY MOUTH ONCE DAILY    Essential hypertension       menthol (Topical Analgesic) 2.5% 2.5 % Gel topical gel    BENGAY VANISHIN SCENT     Apply to neck twice daily as needed for pain.    Cervicalgia       metFORMIN 500 MG tablet    GLUCOPHAGE    180 tablet    Take 1 tablet (500 mg) by mouth 2 times daily (with meals)    Controlled type 2 diabetes mellitus with complication, without long-term current use of insulin (H)       metoprolol 50 MG 24 hr tablet    TOPROL-XL    135 tablet    Take 1 in AM (50 mg) and 1/2 in PM (25 mg)    Essential hypertension, benign       nitroGLYcerin 0.4 MG sublingual tablet    NITROSTAT    60 tablet    Place 1 tablet (0.4 mg) under the tongue every 5 minutes as needed    CAD (coronary artery disease)       order for DME     1 Units    Equipment being ordered: Thigh high compression stockings    Edema, Type 2 diabetes, HbA1C goal < 8% (H)       PEPCID 20 MG tablet   Generic drug:  famotidine      Take 20 mg by mouth as needed        sertraline 50 MG tablet    ZOLOFT    90 tablet    TAKE ONE TABLET BY MOUTH ONCE DAILY    Adjustment disorder with mixed anxiety and depressed mood       simvastatin 40 MG tablet    ZOCOR    90 tablet    Take 0.5 tablets (20 mg) by mouth At Bedtime    Hyperlipidemia LDL goal <100       tamsulosin 0.4 MG capsule    FLOMAX    90 capsule    Take 1 capsule (0.4 mg) by mouth daily    Prostate cancer (H)       traMADol 50 MG tablet    ULTRAM     Take 50 mg  by mouth 2 times daily as needed           medications

## 2017-10-10 NOTE — PATIENT INSTRUCTIONS
Schedule physical therapy with [facility] (site per your request).    Your knee pain appears to be related to tight hamstrings and deconditioning.    Acetaminophen 325 mg orally 1-2 tabs every 4-6 hrs as needed for pain.  If not enough relief,  May take Naproxen 220 mg orally with food every 12 hrs as needed for pain.    If with swelling of legs/knee, severe pain, redness of legs/joint or fever, see provider promptly.

## 2017-10-10 NOTE — NURSING NOTE
"Chief Complaint   Patient presents with     Diabetes     Recheck/refills.       Initial /74  Pulse 81  Temp 97.5  F (36.4  C) (Tympanic)  Resp 16  Ht 5' 6\" (1.676 m)  Wt 212 lb (96.2 kg)  SpO2 98%  BMI 34.22 kg/m2 Estimated body mass index is 34.22 kg/(m^2) as calculated from the following:    Height as of this encounter: 5' 6\" (1.676 m).    Weight as of this encounter: 212 lb (96.2 kg).    Medication Reconciliation:  complete    Conchita Hernandez CMA (AAMA)  "

## 2017-10-11 DIAGNOSIS — E11.22 CONTROLLED TYPE 2 DIABETES MELLITUS WITH STAGE 3 CHRONIC KIDNEY DISEASE, WITHOUT LONG-TERM CURRENT USE OF INSULIN (H): ICD-10-CM

## 2017-10-11 DIAGNOSIS — N18.30 CONTROLLED TYPE 2 DIABETES MELLITUS WITH STAGE 3 CHRONIC KIDNEY DISEASE, WITHOUT LONG-TERM CURRENT USE OF INSULIN (H): ICD-10-CM

## 2017-10-11 DIAGNOSIS — I10 ESSENTIAL HYPERTENSION WITH GOAL BLOOD PRESSURE LESS THAN 130/80: ICD-10-CM

## 2017-10-11 NOTE — TELEPHONE ENCOUNTER
Amlodipine      Last Written Prescription Date: 07/07/2016  Last Fill Quantity: 90, # refills: 3  Last Office Visit with [facility], [facility] or [facility] prescribing provider: 10/10/2017       Potassium   Date Value Ref Range Status   08/03/2017 4.2 3.4 - 5.3 mmol/L Final     Comment:     Specimen slightly hemolyzed, potassium may be falsely elevated     Creatinine   Date Value Ref Range Status   08/03/2017 1.39 (H) 0.66 - 1.25 mg/dL Final     BP Readings from Last 3 Encounters:   10/10/17 143/74   08/03/17 126/67   05/17/17 126/68     Glipizide         Last Written Prescription Date: 02/21/2017  Last Fill Quantity: 90, # refills: 1  Last Office Visit with [facility], [facility] or [facility] prescribing provider:  10/10/2017        BP Readings from Last 3 Encounters:   10/10/17 143/74   08/03/17 126/67   05/17/17 126/68     Lab Results   Component Value Date    MICROL 31 09/19/2016     Lab Results   Component Value Date    UMALCR 35.86 09/19/2016     Creatinine   Date Value Ref Range Status   08/03/2017 1.39 (H) 0.66 - 1.25 mg/dL Final   ]  GFR Estimate   Date Value Ref Range Status   08/03/2017 49 (L) >60 mL/min/1.7m2 Final     Comment:     Non  GFR Calc   11/14/2016 36 (L) >60 mL/min/1.7m2 Final     Comment:     Non  GFR Calc   11/10/2016 38 (L) >60 mL/min/1.7m2 Final     Comment:     Non  GFR Calc     GFR Estimate If Black   Date Value Ref Range Status   08/03/2017 59 (L) >60 mL/min/1.7m2 Final     Comment:      GFR Calc   11/14/2016 44 (L) >60 mL/min/1.7m2 Final     Comment:      GFR Calc   11/10/2016 45 (L) >60 mL/min/1.7m2 Final     Comment:      GFR Calc     Lab Results   Component Value Date    CHOL 158 09/19/2016     Lab Results   Component Value Date    HDL 37 09/19/2016     Lab Results   Component Value Date    LDL 85 09/19/2016     Lab Results   Component Value Date    TRIG 178 09/19/2016     Lab Results   Component Value Date     KATELYNNHDVAUGHNO 4.3 04/13/2015     Lab Results   Component Value Date    AST 20 11/10/2016     Lab Results   Component Value Date    ALT 37 11/10/2016     Lab Results   Component Value Date    A1C 6.3 08/03/2017    A1C 5.8 03/28/2017    A1C 5.8 09/19/2016    A1C 6.0 07/12/2016    A1C 6.0 03/14/2016     Potassium   Date Value Ref Range Status   08/03/2017 4.2 3.4 - 5.3 mmol/L Final     Comment:     Specimen slightly hemolyzed, potassium may be falsely elevated     Geovany Bran RT (R)

## 2017-10-19 RX ORDER — TAMSULOSIN HYDROCHLORIDE 0.4 MG/1
CAPSULE ORAL
Qty: 90 CAPSULE | Refills: 2 | Status: SHIPPED | OUTPATIENT
Start: 2017-10-19

## 2017-10-24 ENCOUNTER — TRANSFERRED RECORDS (OUTPATIENT)
Dept: HEALTH INFORMATION MANAGEMENT | Facility: CLINIC | Age: 82
End: 2017-10-24

## 2017-10-26 RX ORDER — GLIPIZIDE 5 MG/1
TABLET, FILM COATED, EXTENDED RELEASE ORAL
Qty: 90 TABLET | Refills: 1 | Status: SHIPPED | OUTPATIENT
Start: 2017-10-26

## 2017-10-26 RX ORDER — AMLODIPINE BESYLATE 10 MG/1
TABLET ORAL
Qty: 90 TABLET | Refills: 3 | Status: SHIPPED | OUTPATIENT
Start: 2017-10-26

## 2017-10-31 ENCOUNTER — HOSPITAL ENCOUNTER (OUTPATIENT)
Dept: CARDIOLOGY | Facility: CLINIC | Age: 82
Discharge: HOME | End: 2017-10-31
Attending: INTERNAL MEDICINE | Admitting: INTERNAL MEDICINE
Payer: MEDICARE

## 2017-10-31 ENCOUNTER — DOCUMENTATION ONLY (OUTPATIENT)
Dept: CARDIOLOGY | Facility: CLINIC | Age: 82
End: 2017-10-31

## 2017-10-31 PROCEDURE — 93288 INTERROG EVL PM/LDLS PM IP: CPT

## 2017-10-31 PROCEDURE — 93288 INTERROG EVL PM/LDLS PM IP: CPT | Mod: 26 | Performed by: INTERNAL MEDICINE

## 2017-10-31 NOTE — PROGRESS NOTES
Uzair Madrid DR Pacemaker Device Check/ [place]  AP: <1 % : 100 %  Mode: DDD/ 55 bpm        Underlying Rhythm: SR with 2:1 AVB  Heart Rate: Histogram is stable with adequate HR  Sensing: Stable    Pacing Threshold: Stable   Impedance: WNL  Battery Status: 9 years estimated longevity  Atrial Arrhythmia: 4 episodes of PMT  Ventricular Arrhythmia: NONE  Setting Change: NONE    Care Plan: Remote in  3 months. sml

## 2017-10-31 NOTE — ADDENDUM NOTE
Encounter addended by: Karine Diaz on: 10/31/2017 11:39 AM<BR>     Actions taken:  activity accessed, Charge Capture section accepted

## 2017-11-01 ENCOUNTER — TELEPHONE (OUTPATIENT)
Dept: FAMILY MEDICINE | Facility: CLINIC | Age: 82
End: 2017-11-01

## 2017-11-01 NOTE — TELEPHONE ENCOUNTER
Form from [facility] PT initial assessment. Orders were signed, faxed and sent to scanning.    [facility]

## 2017-12-19 DIAGNOSIS — I10 ESSENTIAL HYPERTENSION: ICD-10-CM

## 2017-12-19 NOTE — TELEPHONE ENCOUNTER
Losartan    Last Written Prescription Date: 05/25/17  Last Fill Quantity: 90, # refills: 0    Last Office Visit with [facility], P or [facility] prescribing provider:  10/10/17   Future Office Visit:        BP Readings from Last 3 Encounters:   10/10/17 143/74   08/03/17 126/67   05/17/17 126/68

## 2017-12-20 RX ORDER — LOSARTAN POTASSIUM 50 MG/1
50 TABLET ORAL DAILY
Qty: 90 TABLET | Refills: 1 | Status: SHIPPED | OUTPATIENT
Start: 2017-12-20

## 2017-12-20 NOTE — TELEPHONE ENCOUNTER
Routing refill request to provider for review/approval because:  Labs out of range:  CR 1.39    Thank you  Ellie FERNANDEZ RN

## 2018-02-26 ENCOUNTER — HOSPITAL ENCOUNTER (OUTPATIENT)
Dept: CARDIOLOGY | Facility: CLINIC | Age: 83
Discharge: HOME | End: 2018-02-26
Attending: PHYSICIAN ASSISTANT | Admitting: PHYSICIAN ASSISTANT
Payer: MEDICARE

## 2018-02-26 DIAGNOSIS — I25.5 CARDIOMYOPATHY, ISCHEMIC: ICD-10-CM

## 2018-02-26 PROCEDURE — 40000264 ECHO COMPLETE WITH OPTISON

## 2018-02-26 PROCEDURE — 93306 TTE W/DOPPLER COMPLETE: CPT | Mod: 26 | Performed by: INTERNAL MEDICINE

## 2018-02-26 PROCEDURE — 25500064 ZZH RX 255 OP 636: Performed by: PHYSICIAN ASSISTANT

## 2018-02-26 RX ADMIN — HUMAN ALBUMIN MICROSPHERES AND PERFLUTREN 2 ML: 10; .22 INJECTION, SOLUTION INTRAVENOUS at 13:30

## 2018-03-06 ENCOUNTER — DOCUMENTATION ONLY (OUTPATIENT)
Dept: CARDIOLOGY | Facility: CLINIC | Age: 83
End: 2018-03-06

## 2018-03-06 ENCOUNTER — HOSPITAL ENCOUNTER (OUTPATIENT)
Dept: CARDIOLOGY | Facility: CLINIC | Age: 83
Discharge: HOME | End: 2018-03-06
Attending: INTERNAL MEDICINE | Admitting: INTERNAL MEDICINE
Payer: MEDICARE

## 2018-03-06 PROCEDURE — 93294 REM INTERROG EVL PM/LDLS PM: CPT | Performed by: INTERNAL MEDICINE

## 2018-03-06 PROCEDURE — 93296 REM INTERROG EVL PM/IDS: CPT

## 2018-03-06 NOTE — PROGRESS NOTES
Latitude NXT Remote PPM Device Check  AP: 2 %  : 100 %  Mode: DDD        Presenting Rhythm: AS/  Heart Rate: Adequate per the histograms  Sensing: Stable    Pacing Threshold: Stable    Impedance: Stable  Battery Status: 8.5 yrs remain.  Atrial Arrhythmia: 5 PMT episodes noted. As=Vs rate 120 bpms. This is not a new finding.     Ventricular Arrhythmia: 2 VHR episodes, 6-18 secs, w/ rates of 165-192 bpm. EGM's suggest NSVT.  EF 55 % (echo 2/26/18). Called pt regarding his event on 3/4/18, denies any symptoms, states he's walking to breakfast at that time.  Reviewed w/ S. Langenbrunner RN. This is not a new finding.     Care Plan: Latitude NXT Remote q 3 months. Pt called w/ results and next appt date.

## 2018-03-12 ENCOUNTER — OFFICE VISIT (OUTPATIENT)
Dept: CARDIOLOGY | Facility: CLINIC | Age: 83
End: 2018-03-12
Attending: PHYSICIAN ASSISTANT
Payer: COMMERCIAL

## 2018-03-12 VITALS
DIASTOLIC BLOOD PRESSURE: 65 MMHG | OXYGEN SATURATION: 97 % | BODY MASS INDEX: 34.22 KG/M2 | HEART RATE: 74 BPM | WEIGHT: 212 LBS | SYSTOLIC BLOOD PRESSURE: 114 MMHG

## 2018-03-12 DIAGNOSIS — I10 BENIGN ESSENTIAL HYPERTENSION: Chronic | ICD-10-CM

## 2018-03-12 DIAGNOSIS — I35.0 AORTIC VALVE STENOSIS, ETIOLOGY OF CARDIAC VALVE DISEASE UNSPECIFIED: Primary | ICD-10-CM

## 2018-03-12 DIAGNOSIS — I25.10 CORONARY ARTERY DISEASE INVOLVING NATIVE HEART, ANGINA PRESENCE UNSPECIFIED, UNSPECIFIED VESSEL OR LESION TYPE: ICD-10-CM

## 2018-03-12 DIAGNOSIS — E78.5 HYPERLIPIDEMIA LDL GOAL <100: ICD-10-CM

## 2018-03-12 PROCEDURE — 99214 OFFICE O/P EST MOD 30 MIN: CPT | Performed by: INTERNAL MEDICINE

## 2018-03-12 NOTE — PROGRESS NOTES
Service Date: 03/12/2018      HISTORY OF PRESENT ILLNESS:  Mr. Silva is a pleasant 86-year-old gentleman with a history of coronary artery disease.  [facility], he underwent a cardiac catheterization in the past showing diffuse 3-vessel coronary disease not amenable to PCI.  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  We have been following him here at [place] since 2014.  He saw Ellie Cummings 6 months ago.  He said blood flow the transthoracic echocardiogram and 6-month followup.  He is somewhat late for his 6-month followup.      The patient has been feeling reasonably well from a cardiovascular standpoint.  He has stable angina.  He will get chest heaviness if he walks vigorously.  He never has these symptoms at rest.  If he does not walk vigorously, he does not get any chest heaviness.  He denies any other cardiovascular complaints at this time.  He states that the chest heaviness has not changed for years.  He denies any other symptoms such as palpitations, orthopnea or paroxysmal nocturnal dyspnea.      The patient underwent a transthoracic echocardiogram on 02/26/2018, showing an ejection fraction of 55%.  There was apical hypokinesis.  There was of mild LVH and mild aortic stenosis with a mean gradient of 13 mmHg.      Please see my separate note with his full physical examination.      IMPRESSION AND PLAN:   1.  Mild aortic stenosis.  This is a new finding for Mr. Silva, but he is clearly asymptomatic regarding his aortic stenosis.  I will ask that he return in followup in 1 year with a repeat transthoracic echocardiogram to be completed at that time.  If the patient ever does develop severe aortic stenosis, he would be a candidate for TAVR only.   2.  Coronary artery disease.  The patient has stable angina and given his lack of worrisome  symptoms, I would recommend continued medical management.  I did discuss potentially increasing his antianginals, however, given his advanced age, frail status and low blood pressure, I think it would be best to not increase his antianginal medications at this time.  He is instructed to call the clinic should he start noticing any rest pain, in which case I believe that it would be reasonable to get a cardiac catheterization to make sure we are not missing any proximal coronary disease which would be amenable to PCI.  In the interim, he is to remain on aspirin, statin and beta blocker unchanged.   3.  Dyslipidemia.  The patient will remain on simvastatin 20 mg daily unchanged for secondary prevention.   4.  Hypertension.  Blood pressure is actually low in the clinic today.  Fortunately, he is not symptomatic from this and for now, will continue his antihypertensive regimen unchanged.      Overall, Mr. Silva is clinically stable, and I will plan to have him return to the clinic in 1 year with a repeat transthoracic echocardiogram to be completed at that time.         ANJU FALL MD             D: 2018   T: 2018   MT: HELEN      Name:     SAUL SILVA   MRN:      [id number]        Account:      [id number]   :      1932           Service Date: 2018      Document: [id number]

## 2018-03-12 NOTE — PROGRESS NOTES
HPI and Plan:   See dictation    Orders Placed This Encounter   Procedures     Follow-Up with Cardiologist     Echocardiogram       No orders of the defined types were placed in this encounter.      There are no discontinued medications.      Encounter Diagnoses   Name Primary?     Benign essential hypertension      Hyperlipidemia LDL goal <100      Aortic valve stenosis, etiology of cardiac valve disease unspecified Yes     Coronary artery disease involving native heart, angina presence unspecified, unspecified vessel or lesion type        CURRENT MEDICATIONS:  Current Outpatient Prescriptions   Medication Sig Dispense Refill     losartan (COZAAR) 50 MG tablet Take 1 tablet (50 mg) by mouth daily 90 tablet 1     glipiZIDE (GLUCOTROL XL) 5 MG 24 hr tablet TAKE ONE TABLET BY MOUTH ONCE DAILY 90 tablet 1     amLODIPine (NORVASC) 10 MG tablet TAKE ONE TABLET BY MOUTH ONCE DAILY 90 tablet 3     tamsulosin (FLOMAX) 0.4 MG capsule TAKE ONE CAPSULE BY MOUTH ONCE DAILY 90 capsule 2     order for [facility] Four- wheeled walker 1 Units 0     sertraline (ZOLOFT) 50 MG tablet TAKE ONE TABLET BY MOUTH ONCE DAILY 90 tablet 3     metFORMIN (GLUCOPHAGE) 500 MG tablet Take 1 tablet (500 mg) by mouth 2 times daily (with meals) 180 tablet 3     menthol, Topical Analgesic, 2.5% (BENGAY VANISHIN SCENT) 2.5 % GEL topical gel Apply to neck twice daily as needed for pain.       traMADol (ULTRAM) 50 MG tablet Take 50 mg by mouth 2 times daily as needed       acetaminophen (TYLENOL) 325 MG tablet Take 325-650 mg by mouth every 6 hours as needed for mild pain Reported on 4/10/2017 100 tablet 0     metoprolol (TOPROL-XL) 50 MG 24 hr tablet Take 1 in AM (50 mg) and 1/2 in PM (25 mg) 135 tablet 5     nitroglycerin (NITROSTAT) 0.4 MG sublingual tablet Place 1 tablet (0.4 mg) under the tongue every 5 minutes as needed 60 tablet 0     famotidine (PEPCID) 20 MG tablet Take 20 mg by mouth as needed       simvastatin (ZOCOR) 40 MG tablet Take 0.5 tablets  (20 mg) by mouth At Bedtime 90 tablet 3     blood glucose monitoring (ACCU-CHEK GEOFFREY) test strip Use to test blood sugars 1 times daily or as directed. 100 each 2     aspirin 81 MG tablet Take 1 tablet (81 mg) by mouth daily 30 tablet      ORDER FOR DME Equipment being ordered: Thigh high compression stockings 1 Units 1     docusate sodium (COLACE) 100 MG capsule Take 100 mg by mouth daily          ALLERGIES     Allergies   Allergen Reactions     Nkda [No Known Drug Allergies]        PAST MEDICAL HISTORY:  Past Medical History:   Diagnosis Date     Adenomatous polyps 5/16/2013     Advance Care Planning 1/15/2014    Advance Care Planning: Receipt of ACP document:  Received: Health Care Directive which was witnessed or notarized on 4/25/2011.  Document previously scanned on 1/31/2014.  Validation form completed and sent to be scanned.  Code Status reflects choices in most recent ACP document.  Confirmed/documented designated decision maker(s). See permanent comments section of demographics in clinical tab. Vie     Arteriosclerotic heart disease 5/16/2013     BPH (benign prostatic hyperplasia) 5/16/2013     Diabetes mellitus (H)      Diabetic nephropathy (H) 1/26/2015     MEJIA (dyspnea on exertion)      Exertional angina (H) 5/16/2013     GERD (gastroesophageal reflux disease) 5/16/2013     HTN (hypertension) 5/16/2013     Hyperlipidemia LDL goal <100 6/10/2013     Hypertension      Obese      Onychia of toe 5/16/2013     Osteoarthritis 5/16/2013     Prostate cancer (H) 5/16/2013     Type 2 diabetes, HbA1C goal < 8% (H) 5/16/2013       PAST SURGICAL HISTORY:  Past Surgical History:   Procedure Laterality Date     ANGIOGRAM  2010     CARPAL TUNNEL RELEASE RT/LT  2005     COLONOSCOPY  2008     ESOPHAGOSCOPY, GASTROSCOPY, DUODENOSCOPY (EGD), COMBINED  10/17/2011    Procedure:COMBINED ESOPHAGOSCOPY, GASTROSCOPY, DUODENOSCOPY (EGD), BIOPSY SINGLE OR MULTIPLE; Surgeon:TOÑO TAYLOR; Location:[facility]     HEMORRHOIDECTOMY        IMPLANT PACEMAKER       RELEASE CARPAL TUNNEL  8/15/2014    Procedure: RELEASE CARPAL TUNNEL;  Surgeon: Thom Hill MD;  Location: [facility]       FAMILY HISTORY:  Family History   Problem Relation Age of Onset     HEART DISEASE Sister      HEART DISEASE Brother      HEART DISEASE Brother      Alcohol/Drug Brother      HEART DISEASE Sister      Neurologic Disorder Daughter      parkinson's       SOCIAL HISTORY:  Social History     Social History     Marital status:      Spouse name: N/A     Number of children: N/A     Years of education: N/A     Social History Main Topics     Smoking status: Never Smoker     Smokeless tobacco: Never Used     Alcohol use Yes      Comment: rare     Drug use: No     Sexual activity: Not Currently     Partners: Female     Other Topics Concern     Parent/Sibling W/ Cabg, Mi Or Angioplasty Before 65f 55m? Yes     Social History Narrative       Review of Systems:  Skin:  Positive for itching     Eyes:  Positive for visual blurring    ENT:  Positive for tinnitus;nasal congestion    Respiratory:  Positive for dyspnea on exertion;cough unchanged   Cardiovascular:    Positive for;fatigue;dizziness;chest pain;palpitations    Gastroenterology: Positive for excessive gas or bloating;heartburn    Genitourinary:  Positive for urinary frequency    Musculoskeletal:  Positive for joint pain;joint swelling;foot pain    Neurologic:  Positive for numbness or tingling of feet;numbness or tingling of hands;memory problems    Psychiatric:  Positive for depression    Heme/Lymph/Imm:  Negative      Endocrine:  Positive for diabetes      Physical Exam:  Vitals: /65  Pulse 74  Wt 96.2 kg (212 lb)  SpO2 97%  BMI 34.22 kg/m2    Constitutional:  well developed        Skin:  warm and dry to the touch   pacemaker incision in the left infraclavicular area was well-healed      Head:  normocephalic        Eyes:  sclera white        Lymph:No Cervical lymphadenopathy present     ENT:  no pallor  or cyanosis        Neck:  no stiffness        Respiratory:  clear to auscultation         Cardiac: regular rhythm;normal S1 and S2       systolic murmur;grade 2        pulses full and equal                                        GI:  abdomen soft obese      Extremities and Muscular Skeletal:  no edema;no deformities, clubbing, cyanosis, erythema observed              Neurological:  no gross motor deficits;affect appropriate        Psych:  Alert and Oriented x 3        CC  Ellie Cummings PA-C  [address] [place] [address]

## 2018-03-12 NOTE — LETTER
3/12/2018      Stefan Trejo MD  [address] [facility] [address]      RE: Derrick Silva       Dear Colleague,    I had the pleasure of seeing Derrick Silva in the [facility].    Service Date: 03/12/2018      HISTORY OF PRESENT ILLNESS:  Mr. Silva is a pleasant 86-year-old gentleman with a history of coronary artery disease.  [facility], he underwent a cardiac catheterization in the past showing diffuse 3-vessel coronary disease not amenable to PCI.  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  We have been following him here at [place] since 2014.  He saw Ellie Cummings 6 months ago.  He said blood flow the transthoracic echocardiogram and 6-month followup.  He is somewhat late for his 6-month followup.      The patient has been feeling reasonably well from a cardiovascular standpoint.  He has stable angina.  He will get chest heaviness if he walks vigorously.  He never has these symptoms at rest.  If he does not walk vigorously, he does not get any chest heaviness.  He denies any other cardiovascular complaints at this time.  He states that the chest heaviness has not changed for years.  He denies any other symptoms such as palpitations, orthopnea or paroxysmal nocturnal dyspnea.      The patient underwent a transthoracic echocardiogram on 02/26/2018, showing an ejection fraction of 55%.  There was apical hypokinesis.  There was of mild LVH and mild aortic stenosis with a mean gradient of 13 mmHg.      Please see my separate note with his full physical examination.      IMPRESSION AND PLAN:   1.  Mild aortic stenosis.  This is a new finding for Mr. Silva, but he is clearly asymptomatic regarding his aortic stenosis.  I will ask that he return in followup in 1 year with a repeat  transthoracic echocardiogram to be completed at that time.  If the patient ever does develop severe aortic stenosis, he would be a candidate for TAVR only.   2.  Coronary artery disease.  The patient has stable angina and given his lack of worrisome symptoms, I would recommend continued medical management.  I did discuss potentially increasing his antianginals, however, given his advanced age, frail status and low blood pressure, I think it would be best to not increase his antianginal medications at this time.  He is instructed to call the clinic should he start noticing any rest pain, in which case I believe that it would be reasonable to get a cardiac catheterization to make sure we are not missing any proximal coronary disease which would be amenable to PCI.  In the interim, he is to remain on aspirin, statin and beta blocker unchanged.   3.  Dyslipidemia.  The patient will remain on simvastatin 20 mg daily unchanged for secondary prevention.   4.  Hypertension.  Blood pressure is actually low in the clinic today.  Fortunately, he is not symptomatic from this and for now, will continue his antihypertensive regimen unchanged.      Overall, Mr. Silva is clinically stable, and I will plan to have him return to the clinic in 1 year with a repeat transthoracic echocardiogram to be completed at that time.         ANJU FALL MD             D: 2018   T: 2018   MT: HELEN      Name:     SAUL SILVA   MRN:      [id number]        Account:      [id number]   :      1932           Service Date: 2018      Document: [id number]         Outpatient Encounter Prescriptions as of 3/12/2018   Medication Sig Dispense Refill     losartan (COZAAR) 50 MG tablet Take 1 tablet (50 mg) by mouth daily 90 tablet 1     glipiZIDE (GLUCOTROL XL) 5 MG 24 hr tablet TAKE ONE TABLET BY MOUTH ONCE DAILY 90 tablet 1     amLODIPine (NORVASC) 10 MG tablet TAKE ONE TABLET BY MOUTH ONCE DAILY 90 tablet 3     tamsulosin  (FLOMAX) 0.4 MG capsule TAKE ONE CAPSULE BY MOUTH ONCE DAILY 90 capsule 2     order for DME Four- wheeled walker 1 Units 0     sertraline (ZOLOFT) 50 MG tablet TAKE ONE TABLET BY MOUTH ONCE DAILY 90 tablet 3     metFORMIN (GLUCOPHAGE) 500 MG tablet Take 1 tablet (500 mg) by mouth 2 times daily (with meals) 180 tablet 3     menthol, Topical Analgesic, 2.5% (BENGAY VANISHIN SCENT) 2.5 % GEL topical gel Apply to neck twice daily as needed for pain.       traMADol (ULTRAM) 50 MG tablet Take 50 mg by mouth 2 times daily as needed       acetaminophen (TYLENOL) 325 MG tablet Take 325-650 mg by mouth every 6 hours as needed for mild pain Reported on 4/10/2017 100 tablet 0     metoprolol (TOPROL-XL) 50 MG 24 hr tablet Take 1 in AM (50 mg) and 1/2 in PM (25 mg) 135 tablet 5     nitroglycerin (NITROSTAT) 0.4 MG sublingual tablet Place 1 tablet (0.4 mg) under the tongue every 5 minutes as needed 60 tablet 0     famotidine (PEPCID) 20 MG tablet Take 20 mg by mouth as needed       simvastatin (ZOCOR) 40 MG tablet Take 0.5 tablets (20 mg) by mouth At Bedtime 90 tablet 3     blood glucose monitoring (ACCU-CHEK GEOFFREY) test strip Use to test blood sugars 1 times daily or as directed. 100 each 2     aspirin 81 MG tablet Take 1 tablet (81 mg) by mouth daily 30 tablet      ORDER FOR DME Equipment being ordered: Thigh high compression stockings 1 Units 1     docusate sodium (COLACE) 100 MG capsule Take 100 mg by mouth daily        No facility-administered encounter medications on file as of 3/12/2018.        Again, thank you for allowing me to participate in the care of your patient.      Sincerely,    Tonny Gann MD     [facility]

## 2018-03-12 NOTE — LETTER
3/12/2018    Stefan Trejo MD  [address] [facility] [address]    RE: Derrick Angel Silva       Dear Colleague,    I had the pleasure of seeing Derrick Silva in the [facility].    HPI and Plan:   See dictation    Orders Placed This Encounter   Procedures     Follow-Up with Cardiologist     Echocardiogram       No orders of the defined types were placed in this encounter.      There are no discontinued medications.      Encounter Diagnoses   Name Primary?     Benign essential hypertension      Hyperlipidemia LDL goal <100      Aortic valve stenosis, etiology of cardiac valve disease unspecified Yes     Coronary artery disease involving native heart, angina presence unspecified, unspecified vessel or lesion type        CURRENT MEDICATIONS:  Current Outpatient Prescriptions   Medication Sig Dispense Refill     losartan (COZAAR) 50 MG tablet Take 1 tablet (50 mg) by mouth daily 90 tablet 1     glipiZIDE (GLUCOTROL XL) 5 MG 24 hr tablet TAKE ONE TABLET BY MOUTH ONCE DAILY 90 tablet 1     amLODIPine (NORVASC) 10 MG tablet TAKE ONE TABLET BY MOUTH ONCE DAILY 90 tablet 3     tamsulosin (FLOMAX) 0.4 MG capsule TAKE ONE CAPSULE BY MOUTH ONCE DAILY 90 capsule 2     order for DME Four- wheeled walker 1 Units 0     sertraline (ZOLOFT) 50 MG tablet TAKE ONE TABLET BY MOUTH ONCE DAILY 90 tablet 3     metFORMIN (GLUCOPHAGE) 500 MG tablet Take 1 tablet (500 mg) by mouth 2 times daily (with meals) 180 tablet 3     menthol, Topical Analgesic, 2.5% (BENGAY VANISHIN SCENT) 2.5 % GEL topical gel Apply to neck twice daily as needed for pain.       traMADol (ULTRAM) 50 MG tablet Take 50 mg by mouth 2 times daily as needed       acetaminophen (TYLENOL) 325 MG tablet Take 325-650 mg by mouth every 6 hours as needed for mild pain Reported on 4/10/2017 100 tablet 0     metoprolol (TOPROL-XL) 50 MG 24 hr tablet Take 1 in AM (50 mg) and 1/2 in PM (25 mg) 135 tablet 5      nitroglycerin (NITROSTAT) 0.4 MG sublingual tablet Place 1 tablet (0.4 mg) under the tongue every 5 minutes as needed 60 tablet 0     famotidine (PEPCID) 20 MG tablet Take 20 mg by mouth as needed       simvastatin (ZOCOR) 40 MG tablet Take 0.5 tablets (20 mg) by mouth At Bedtime 90 tablet 3     blood glucose monitoring (ACCU-CHEK GEOFFREY) test strip Use to test blood sugars 1 times daily or as directed. 100 each 2     aspirin 81 MG tablet Take 1 tablet (81 mg) by mouth daily 30 tablet      ORDER FOR DME Equipment being ordered: Thigh high compression stockings 1 Units 1     docusate sodium (COLACE) 100 MG capsule Take 100 mg by mouth daily          ALLERGIES     Allergies   Allergen Reactions     Nkda [No Known Drug Allergies]        PAST MEDICAL HISTORY:  Past Medical History:   Diagnosis Date     Adenomatous polyps 5/16/2013     Advance Care Planning 1/15/2014    Advance Care Planning: Receipt of ACP document:  Received: Health Care Directive which was witnessed or notarized on 4/25/2011.  Document previously scanned on 1/31/2014.  Validation form completed and sent to be scanned.  Code Status reflects choices in most recent ACP document.  Confirmed/documented designated decision maker(s). See permanent comments section of demographics in clinical tab. Vie     Arteriosclerotic heart disease 5/16/2013     BPH (benign prostatic hyperplasia) 5/16/2013     Diabetes mellitus (H)      Diabetic nephropathy (H) 1/26/2015     MEJIA (dyspnea on exertion)      Exertional angina (H) 5/16/2013     GERD (gastroesophageal reflux disease) 5/16/2013     HTN (hypertension) 5/16/2013     Hyperlipidemia LDL goal <100 6/10/2013     Hypertension      Obese      Onychia of toe 5/16/2013     Osteoarthritis 5/16/2013     Prostate cancer (H) 5/16/2013     Type 2 diabetes, HbA1C goal < 8% (H) 5/16/2013       PAST SURGICAL HISTORY:  Past Surgical History:   Procedure Laterality Date     ANGIOGRAM  2010     CARPAL TUNNEL RELEASE RT/LT  2005      COLONOSCOPY  2008     ESOPHAGOSCOPY, GASTROSCOPY, DUODENOSCOPY (EGD), COMBINED  10/17/2011    Procedure:COMBINED ESOPHAGOSCOPY, GASTROSCOPY, DUODENOSCOPY (EGD), BIOPSY SINGLE OR MULTIPLE; Surgeon:TOÑO TAYLOR; Location:[facility]     HEMORRHOIDECTOMY       IMPLANT PACEMAKER       RELEASE CARPAL TUNNEL  8/15/2014    Procedure: RELEASE CARPAL TUNNEL;  Surgeon: Thom Hill MD;  Location: [facility]       FAMILY HISTORY:  Family History   Problem Relation Age of Onset     HEART DISEASE Sister      HEART DISEASE Brother      HEART DISEASE Brother      Alcohol/Drug Brother      HEART DISEASE Sister      Neurologic Disorder Daughter      parkinson's       SOCIAL HISTORY:  Social History     Social History     Marital status:      Spouse name: N/A     Number of children: N/A     Years of education: N/A     Social History Main Topics     Smoking status: Never Smoker     Smokeless tobacco: Never Used     Alcohol use Yes      Comment: rare     Drug use: No     Sexual activity: Not Currently     Partners: Female     Other Topics Concern     Parent/Sibling W/ Cabg, Mi Or Angioplasty Before 65f 55m? Yes     Social History Narrative       Review of Systems:  Skin:  Positive for itching     Eyes:  Positive for visual blurring    ENT:  Positive for tinnitus;nasal congestion    Respiratory:  Positive for dyspnea on exertion;cough unchanged   Cardiovascular:    Positive for;fatigue;dizziness;chest pain;palpitations    Gastroenterology: Positive for excessive gas or bloating;heartburn    Genitourinary:  Positive for urinary frequency    Musculoskeletal:  Positive for joint pain;joint swelling;foot pain    Neurologic:  Positive for numbness or tingling of feet;numbness or tingling of hands;memory problems    Psychiatric:  Positive for depression    Heme/Lymph/Imm:  Negative      Endocrine:  Positive for diabetes      Physical Exam:  Vitals: /65  Pulse 74  Wt 96.2 kg (212 lb)  SpO2 97%  BMI 34.22  kg/m2    Constitutional:  well developed        Skin:  warm and dry to the touch   pacemaker incision in the left infraclavicular area was well-healed      Head:  normocephalic        Eyes:  sclera white        Lymph:No Cervical lymphadenopathy present     ENT:  no pallor or cyanosis        Neck:  no stiffness        Respiratory:  clear to auscultation         Cardiac: regular rhythm;normal S1 and S2       systolic murmur;grade 2        pulses full and equal                                        GI:  abdomen soft obese      Extremities and Muscular Skeletal:  no edema;no deformities, clubbing, cyanosis, erythema observed              Neurological:  no gross motor deficits;affect appropriate        Psych:  Alert and Oriented x 3        CC  Ellie Cummings PA-C  [address] [place] [address]                Thank you for allowing me to participate in the care of your patient.      Sincerely,     Tonny Gann MD     [facility] [place] [facility]    cc:   Ellie Cummings PA-C  [address] [place] [address]

## 2018-03-12 NOTE — MR AVS SNAPSHOT
After Visit Summary   3/12/2018    Derrick Silva    MRN: [id number]           Patient Information     Date Of Birth          3/7/1932        Visit Information        Provider Department      3/12/2018 2:00 PM Tonny Gann MD [facility]        Today's Diagnoses     Aortic valve stenosis, etiology of cardiac valve disease unspecified    -  1    Benign essential hypertension        Hyperlipidemia LDL goal <100        Coronary artery disease involving native heart, angina presence unspecified, unspecified vessel or lesion type          Care Instructions    Call the office if your are experiencing more chest pain than usual.          Follow-ups after your visit        Additional Services     Follow-Up with Cardiologist                 Your next 10 appointments already scheduled     Jun 12, 2018  9:30 AM CDT   Remote PPM Check with [facility]   [facility] ([facility])    [address] [address] [phone]           This appointment is for a remote check of your pacemaker.  This is not an appointment at the office.              Future tests that were ordered for you today     Open Future Orders        Priority Expected Expires Ordered    Echocardiogram Routine 3/12/2019 3/13/2019 3/12/2018    Follow-Up with Cardiologist Routine 3/12/2019 3/13/2019 3/12/2018            Who to contact     If you have questions or need follow up information about today's clinic visit or your schedule please contact [facility] directly at [phone].  Normal or non-critical lab and imaging results will be communicated to you by MyChart, letter or phone within 4 business days after the clinic has received the results. If you do not hear from us within 7 days, please contact the clinic through MyChart or phone. If you have a critical or abnormal lab result, we will notify  "you by phone as soon as possible.  Submit refill requests through [employer] [facility] [employer] or call your pharmacy and they will forward the refill request to us. Please allow 3 business days for your refill to be completed.          Additional Information About Your Visit        [facility] Information     [facility] [facility] lets you send messages to your doctor, view your test results, renew your prescriptions, schedule appointments and more. To sign up, go to [email or web address] . Click on \"Log in\" on the left side of the screen, which will take you to the Welcome page. Then click on \"Sign up Now\" on the right side of the page.     You will be asked to enter the access code listed below, as well as some personal information. Please follow the directions to create your username and password.     Your access code is: [email or web address]  Expires: 6/10/2018  2:16 PM     Your access code will  in 90 days. If you need help or a new code, please call your [place] clinic or [phone].        Care EveryWhere ID     This is your Care EveryWhere ID. This could be used by other organizations to access your [place] medical records  [id number]        Your Vitals Were     Pulse Pulse Oximetry BMI (Body Mass Index)             74 97% 34.22 kg/m2          Blood Pressure from Last 3 Encounters:   18 114/65   10/10/17 143/74   17 126/67    Weight from Last 3 Encounters:   18 96.2 kg (212 lb)   10/10/17 96.2 kg (212 lb)   17 99.8 kg (220 lb)              We Performed the Following     Follow-Up with Cardiologist        Primary Care Provider Office Phone # Fax #    Stefan Trejo [phone] [address] [address]        Equal Access to Services     EVELYN MACKENZIE : Allyson Richmond, jose alejandro dodson, qaandressata mahsa parra. So [facility] [phone].    ATENCIÓN: Si habla español, tiene a banerjee disposición servicios " alirio de asistencia lingüística. Yimi dial [phone].    We comply with applicable federal civil rights laws and [place] laws. We do not discriminate on the basis of race, color, national origin, age, disability, sex, sexual orientation, or gender identity.            Thank you!     Thank you for choosing [facility]  for your care. Our goal is always to provide you with excellent care. Hearing back from our patients is one way we can continue to improve our services. Please take a few minutes to complete the written survey that you may receive in the mail after your visit with us. Thank you!             Your Updated Medication List - Protect others around you: Learn how to safely use, store and throw away your medicines at www.disposemymeds.org.          This list is accurate as of 3/12/18  2:16 PM.  Always use your most recent med list.                   Brand Name Dispense Instructions for use Diagnosis    acetaminophen 325 MG tablet    TYLENOL    100 tablet    Take 325-650 mg by mouth every 6 hours as needed for mild pain Reported on 4/10/2017    Cervicalgia       amLODIPine 10 MG tablet    NORVASC    90 tablet    TAKE ONE TABLET BY MOUTH ONCE DAILY    Essential hypertension with goal blood pressure less than 130/80       aspirin 81 MG tablet     30 tablet    Take 1 tablet (81 mg) by mouth daily    CAD (coronary artery disease)       blood glucose monitoring test strip    ACCU-CHEK GEOFFREY    100 each    Use to test blood sugars 1 times daily or as directed.    Type 2 diabetes, HbA1C goal < 8% (H)       docusate sodium 100 MG capsule    COLACE     Take 100 mg by mouth daily        glipiZIDE 5 MG 24 hr tablet    GLUCOTROL XL    90 tablet    TAKE ONE TABLET BY MOUTH ONCE DAILY    Controlled type 2 diabetes mellitus with stage 3 chronic kidney disease, without long-term current use of insulin (H)       losartan 50 MG tablet    COZAAR    90 tablet    Take 1 tablet (50  mg) by mouth daily    Essential hypertension       menthol (Topical Analgesic) 2.5% 2.5 % Gel topical gel    BENGAY VANISHIN SCENT     Apply to neck twice daily as needed for pain.    Cervicalgia       metFORMIN 500 MG tablet    GLUCOPHAGE    180 tablet    Take 1 tablet (500 mg) by mouth 2 times daily (with meals)    Controlled type 2 diabetes mellitus with complication, without long-term current use of insulin (H)       metoprolol succinate 50 MG 24 hr tablet    TOPROL-XL    135 tablet    Take 1 in AM (50 mg) and 1/2 in PM (25 mg)    Essential hypertension, benign       nitroGLYcerin 0.4 MG sublingual tablet    NITROSTAT    60 tablet    Place 1 tablet (0.4 mg) under the tongue every 5 minutes as needed    CAD (coronary artery disease)       order for DME     1 Units    Equipment being ordered: Thigh high compression stockings    Edema, Type 2 diabetes, HbA1C goal < 8% (H)       order for DME     1 Units    Four- wheeled walker    Bilateral chronic knee pain       PEPCID 20 MG tablet   Generic drug:  famotidine      Take 20 mg by mouth as needed        sertraline 50 MG tablet    ZOLOFT    90 tablet    TAKE ONE TABLET BY MOUTH ONCE DAILY    Adjustment disorder with mixed anxiety and depressed mood       simvastatin 40 MG tablet    ZOCOR    90 tablet    Take 0.5 tablets (20 mg) by mouth At Bedtime    Hyperlipidemia LDL goal <100       tamsulosin 0.4 MG capsule    FLOMAX    90 capsule    TAKE ONE CAPSULE BY MOUTH ONCE DAILY    Prostate cancer (H)       traMADol 50 MG tablet    ULTRAM     Take 50 mg by mouth 2 times daily as needed

## 2018-03-23 NOTE — TELEPHONE ENCOUNTER
Forms signed and faxed back to [phone]  Copy sent to boo Mcbride  [facility] [place]    
There were 2 forms faxed over from [employer]   One was for diabetic supplies the other for topical medications  They were completed to the best of my ability and given to PCP for review and signature   Will fax back to company when addressed     Meredith Mcbride  [facility]        
patient's belongings returned

## 2018-04-16 ENCOUNTER — OFFICE VISIT - HEALTHEAST (OUTPATIENT)
Dept: FAMILY MEDICINE | Facility: CLINIC | Age: 83
End: 2018-04-16

## 2018-04-16 DIAGNOSIS — N40.0 BPH (BENIGN PROSTATIC HYPERPLASIA): ICD-10-CM

## 2018-04-16 DIAGNOSIS — I25.10 ARTERIOSCLEROTIC HEART DISEASE: ICD-10-CM

## 2018-04-16 DIAGNOSIS — Z00.00 ENCOUNTER FOR MEDICARE ANNUAL WELLNESS EXAM: ICD-10-CM

## 2018-04-16 DIAGNOSIS — I35.0 AORTIC VALVE STENOSIS, ETIOLOGY OF CARDIAC VALVE DISEASE UNSPECIFIED: ICD-10-CM

## 2018-04-16 DIAGNOSIS — Z79.899 ENCOUNTER FOR LONG-TERM (CURRENT) USE OF HIGH-RISK MEDICATION: ICD-10-CM

## 2018-04-16 DIAGNOSIS — I10 BENIGN ESSENTIAL HYPERTENSION: ICD-10-CM

## 2018-04-16 DIAGNOSIS — Z13.220 ENCOUNTER FOR SCREENING FOR LIPOID DISORDERS: ICD-10-CM

## 2018-04-16 DIAGNOSIS — L30.9 ECZEMA: ICD-10-CM

## 2018-04-16 DIAGNOSIS — I20.89 CHRONIC STABLE ANGINA (H): ICD-10-CM

## 2018-04-16 DIAGNOSIS — C61 PROSTATE CANCER (H): ICD-10-CM

## 2018-04-16 DIAGNOSIS — E11.9 TYPE 2 DIABETES MELLITUS (H): ICD-10-CM

## 2018-04-16 DIAGNOSIS — K59.00 CONSTIPATION: ICD-10-CM

## 2018-04-16 DIAGNOSIS — R41.3 MEMORY DIFFICULTY: ICD-10-CM

## 2018-04-16 DIAGNOSIS — F32.A DEPRESSION: ICD-10-CM

## 2018-04-16 ASSESSMENT — MIFFLIN-ST. JEOR: SCORE: 1569.83

## 2018-04-20 ENCOUNTER — OFFICE VISIT - HEALTHEAST (OUTPATIENT)
Dept: FAMILY MEDICINE | Facility: CLINIC | Age: 83
End: 2018-04-20

## 2018-04-20 DIAGNOSIS — C61 PROSTATE CANCER (H): ICD-10-CM

## 2018-04-20 DIAGNOSIS — F32.A DEPRESSION: ICD-10-CM

## 2018-04-20 DIAGNOSIS — R41.3 MEMORY DEFICIT: ICD-10-CM

## 2018-04-20 DIAGNOSIS — E11.9 TYPE 2 DIABETES MELLITUS (H): ICD-10-CM

## 2018-04-20 DIAGNOSIS — G89.29 CHRONIC PAIN: ICD-10-CM

## 2018-04-20 DIAGNOSIS — R53.1 GENERALIZED WEAKNESS: ICD-10-CM

## 2018-04-20 DIAGNOSIS — I25.10 ARTERIOSCLEROTIC HEART DISEASE: ICD-10-CM

## 2018-04-20 DIAGNOSIS — I20.89 CHRONIC STABLE ANGINA (H): ICD-10-CM

## 2018-04-20 DIAGNOSIS — I10 BENIGN ESSENTIAL HYPERTENSION: ICD-10-CM

## 2018-04-20 DIAGNOSIS — I35.0 AORTIC STENOSIS: ICD-10-CM

## 2018-04-20 DIAGNOSIS — E11.8 DIABETES MELLITUS TYPE 2, CONTROLLED, WITH COMPLICATIONS (H): ICD-10-CM

## 2018-04-20 LAB
ANION GAP SERPL CALCULATED.3IONS-SCNC: 13 MMOL/L (ref 5–18)
BUN SERPL-MCNC: 28 MG/DL (ref 8–28)
CALCIUM SERPL-MCNC: 10.1 MG/DL (ref 8.5–10.5)
CHLORIDE BLD-SCNC: 109 MMOL/L (ref 98–107)
CO2 SERPL-SCNC: 18 MMOL/L (ref 22–31)
CREAT SERPL-MCNC: 1.48 MG/DL (ref 0.7–1.3)
GFR SERPL CREATININE-BSD FRML MDRD: 45 ML/MIN/1.73M2
GLUCOSE BLD-MCNC: 118 MG/DL (ref 70–125)
HBA1C MFR BLD: 6.1 % (ref 3.5–6)
POTASSIUM BLD-SCNC: 4.4 MMOL/L (ref 3.5–5)
PSA SERPL-MCNC: 16.9 NG/ML (ref 0–6.5)
SODIUM SERPL-SCNC: 140 MMOL/L (ref 136–145)

## 2018-04-26 ENCOUNTER — OFFICE VISIT - HEALTHEAST (OUTPATIENT)
Dept: FAMILY MEDICINE | Facility: CLINIC | Age: 83
End: 2018-04-26

## 2018-04-26 DIAGNOSIS — K21.9 GERD (GASTROESOPHAGEAL REFLUX DISEASE): ICD-10-CM

## 2018-04-26 DIAGNOSIS — M54.50 LOW BACK PAIN: ICD-10-CM

## 2018-04-26 DIAGNOSIS — I25.10 ARTERIOSCLEROTIC HEART DISEASE: ICD-10-CM

## 2018-04-26 DIAGNOSIS — Z66: ICD-10-CM

## 2018-04-26 DIAGNOSIS — E11.8 CONTROLLED TYPE 2 DIABETES MELLITUS WITH COMPLICATION, WITHOUT LONG-TERM CURRENT USE OF INSULIN (H): ICD-10-CM

## 2018-04-26 DIAGNOSIS — F32.A DEPRESSION: ICD-10-CM

## 2018-04-26 DIAGNOSIS — G89.29 CHRONIC PAIN: ICD-10-CM

## 2018-04-26 DIAGNOSIS — C61 PROSTATE CANCER (H): ICD-10-CM

## 2018-04-26 DIAGNOSIS — E78.5 HYPERLIPIDEMIA LDL GOAL <100: ICD-10-CM

## 2018-04-30 ENCOUNTER — COMMUNICATION - HEALTHEAST (OUTPATIENT)
Dept: NURSING | Facility: CLINIC | Age: 83
End: 2018-04-30

## 2018-05-10 ENCOUNTER — RECORDS - HEALTHEAST (OUTPATIENT)
Dept: ADMINISTRATIVE | Facility: OTHER | Age: 83
End: 2018-05-10

## 2018-05-10 ENCOUNTER — AMBULATORY - HEALTHEAST (OUTPATIENT)
Dept: NURSING | Facility: CLINIC | Age: 83
End: 2018-05-10

## 2018-05-16 ENCOUNTER — COMMUNICATION - HEALTHEAST (OUTPATIENT)
Dept: FAMILY MEDICINE | Facility: CLINIC | Age: 83
End: 2018-05-16

## 2018-05-21 ENCOUNTER — COMMUNICATION - HEALTHEAST (OUTPATIENT)
Dept: FAMILY MEDICINE | Facility: CLINIC | Age: 83
End: 2018-05-21

## 2018-05-23 ENCOUNTER — COMMUNICATION - HEALTHEAST (OUTPATIENT)
Dept: FAMILY MEDICINE | Facility: CLINIC | Age: 83
End: 2018-05-23

## 2018-05-23 ENCOUNTER — HOSPITAL ENCOUNTER (OUTPATIENT)
Dept: PHYSICAL MEDICINE AND REHAB | Facility: CLINIC | Age: 83
Discharge: HOME | End: 2018-05-23
Attending: FAMILY MEDICINE

## 2018-05-23 DIAGNOSIS — G47.9 SLEEP DISTURBANCE: ICD-10-CM

## 2018-05-23 DIAGNOSIS — M54.50 LUMBALGIA: ICD-10-CM

## 2018-05-23 DIAGNOSIS — M79.18 MYOFASCIAL PAIN: ICD-10-CM

## 2018-05-23 DIAGNOSIS — C61 PROSTATE CANCER (H): ICD-10-CM

## 2018-05-23 DIAGNOSIS — M54.16 LUMBAR RADICULITIS: ICD-10-CM

## 2018-05-23 DIAGNOSIS — N40.0 BPH (BENIGN PROSTATIC HYPERPLASIA): ICD-10-CM

## 2018-05-23 ASSESSMENT — MIFFLIN-ST. JEOR: SCORE: 1524.93

## 2018-05-24 ENCOUNTER — COMMUNICATION - HEALTHEAST (OUTPATIENT)
Dept: NURSING | Facility: CLINIC | Age: 83
End: 2018-05-24

## 2018-05-29 ENCOUNTER — HOSPITAL ENCOUNTER (OUTPATIENT)
Dept: CT IMAGING | Facility: HOSPITAL | Age: 83
Discharge: HOME | End: 2018-05-29
Attending: PHYSICIAN ASSISTANT

## 2018-05-29 DIAGNOSIS — M54.16 LUMBAR RADICULITIS: ICD-10-CM

## 2018-05-29 DIAGNOSIS — G47.9 SLEEP DISTURBANCE: ICD-10-CM

## 2018-05-29 DIAGNOSIS — M79.18 MYOFASCIAL PAIN: ICD-10-CM

## 2018-05-29 DIAGNOSIS — M54.50 LUMBALGIA: ICD-10-CM

## 2018-05-30 ENCOUNTER — HOSPITAL ENCOUNTER (OUTPATIENT)
Dept: PHYSICAL MEDICINE AND REHAB | Facility: CLINIC | Age: 83
Discharge: HOME | End: 2018-05-30
Attending: PHYSICIAN ASSISTANT

## 2018-05-30 DIAGNOSIS — R32 URINARY INCONTINENCE: ICD-10-CM

## 2018-05-30 DIAGNOSIS — K59.00 CONSTIPATION: ICD-10-CM

## 2018-05-30 DIAGNOSIS — M51.26 LUMBAR DISC HERNIATION: ICD-10-CM

## 2018-05-30 DIAGNOSIS — M48.062 SPINAL STENOSIS OF LUMBAR REGION WITH NEUROGENIC CLAUDICATION: ICD-10-CM

## 2018-05-30 DIAGNOSIS — M54.16 LUMBAR RADICULITIS: ICD-10-CM

## 2018-05-30 DIAGNOSIS — M54.50 LUMBALGIA: ICD-10-CM

## 2018-05-30 DIAGNOSIS — R29.898 WEAKNESS OF RIGHT FOOT: ICD-10-CM

## 2018-05-30 ASSESSMENT — MIFFLIN-ST. JEOR: SCORE: 1521.75

## 2018-06-01 ENCOUNTER — TELEPHONE (OUTPATIENT)
Dept: FAMILY MEDICINE | Facility: CLINIC | Age: 83
End: 2018-06-01

## 2018-06-01 ENCOUNTER — AMBULATORY - HEALTHEAST (OUTPATIENT)
Dept: NEUROSURGERY | Facility: CLINIC | Age: 83
End: 2018-06-01

## 2018-06-01 DIAGNOSIS — M54.9 BACK PAIN: ICD-10-CM

## 2018-06-04 ENCOUNTER — COMMUNICATION - HEALTHEAST (OUTPATIENT)
Dept: NURSING | Facility: CLINIC | Age: 83
End: 2018-06-04

## 2018-06-04 ENCOUNTER — COMMUNICATION - HEALTHEAST (OUTPATIENT)
Dept: FAMILY MEDICINE | Facility: CLINIC | Age: 83
End: 2018-06-04

## 2018-06-04 DIAGNOSIS — G89.29 CHRONIC PAIN: ICD-10-CM

## 2018-06-06 ENCOUNTER — COMMUNICATION - HEALTHEAST (OUTPATIENT)
Dept: PHYSICAL MEDICINE AND REHAB | Facility: CLINIC | Age: 83
End: 2018-06-06

## 2018-06-06 DIAGNOSIS — M54.50 LUMBALGIA: ICD-10-CM

## 2018-06-07 ENCOUNTER — COMMUNICATION - HEALTHEAST (OUTPATIENT)
Dept: FAMILY MEDICINE | Facility: CLINIC | Age: 83
End: 2018-06-07

## 2018-06-07 ENCOUNTER — HOSPITAL ENCOUNTER (OUTPATIENT)
Dept: RADIOLOGY | Facility: HOSPITAL | Age: 83
Discharge: HOME | End: 2018-06-07
Attending: NEUROLOGICAL SURGERY

## 2018-06-07 ENCOUNTER — HOSPITAL ENCOUNTER (OUTPATIENT)
Dept: CT IMAGING | Facility: HOSPITAL | Age: 83
Discharge: HOME | End: 2018-06-07
Attending: NEUROLOGICAL SURGERY

## 2018-06-07 DIAGNOSIS — M54.9 BACK PAIN: ICD-10-CM

## 2018-06-07 ASSESSMENT — MIFFLIN-ST. JEOR: SCORE: 1560.3

## 2018-06-11 ENCOUNTER — AMBULATORY - HEALTHEAST (OUTPATIENT)
Dept: CARDIOLOGY | Facility: CLINIC | Age: 83
End: 2018-06-11

## 2018-06-11 ENCOUNTER — RECORDS - HEALTHEAST (OUTPATIENT)
Dept: ADMINISTRATIVE | Facility: OTHER | Age: 83
End: 2018-06-11

## 2018-06-12 ENCOUNTER — OFFICE VISIT - HEALTHEAST (OUTPATIENT)
Dept: NEUROSURGERY | Facility: CLINIC | Age: 83
End: 2018-06-12

## 2018-06-12 ENCOUNTER — DOCUMENTATION ONLY (OUTPATIENT)
Dept: CARDIOLOGY | Facility: CLINIC | Age: 83
End: 2018-06-12

## 2018-06-12 ENCOUNTER — COMMUNICATION - HEALTHEAST (OUTPATIENT)
Dept: NURSING | Facility: CLINIC | Age: 83
End: 2018-06-12

## 2018-06-12 ENCOUNTER — HOSPITAL ENCOUNTER (OUTPATIENT)
Dept: CARDIOLOGY | Facility: CLINIC | Age: 83
Discharge: HOME | End: 2018-06-12
Attending: INTERNAL MEDICINE | Admitting: INTERNAL MEDICINE
Payer: MEDICARE

## 2018-06-12 DIAGNOSIS — G89.29 CHRONIC MIDLINE LOW BACK PAIN WITHOUT SCIATICA: ICD-10-CM

## 2018-06-12 DIAGNOSIS — R20.2 NUMBNESS AND TINGLING OF BOTH LEGS BELOW KNEES: ICD-10-CM

## 2018-06-12 DIAGNOSIS — M47.812 CERVICAL SPONDYLOSIS WITHOUT MYELOPATHY: ICD-10-CM

## 2018-06-12 DIAGNOSIS — R20.2 NUMBNESS AND TINGLING IN BOTH HANDS: ICD-10-CM

## 2018-06-12 DIAGNOSIS — R41.89 COGNITIVE IMPAIRMENT: ICD-10-CM

## 2018-06-12 DIAGNOSIS — R20.0 NUMBNESS AND TINGLING OF BOTH LEGS BELOW KNEES: ICD-10-CM

## 2018-06-12 DIAGNOSIS — M54.50 CHRONIC MIDLINE LOW BACK PAIN WITHOUT SCIATICA: ICD-10-CM

## 2018-06-12 DIAGNOSIS — R26.89 IMBALANCE: ICD-10-CM

## 2018-06-12 DIAGNOSIS — R20.0 NUMBNESS AND TINGLING IN BOTH HANDS: ICD-10-CM

## 2018-06-12 DIAGNOSIS — M48.062 NEUROGENIC CLAUDICATION DUE TO LUMBAR SPINAL STENOSIS: ICD-10-CM

## 2018-06-12 PROCEDURE — 93294 REM INTERROG EVL PM/LDLS PM: CPT | Performed by: INTERNAL MEDICINE

## 2018-06-12 PROCEDURE — 93296 REM INTERROG EVL PM/IDS: CPT

## 2018-06-12 ASSESSMENT — MIFFLIN-ST. JEOR: SCORE: 1560.3

## 2018-06-12 NOTE — PROGRESS NOTES
Latitude NXT Remote PPM Device Check  AP: 2 %  : 100 %  Mode: DDD        Presenting Rhythm: AP/  Heart Rate: Adequate per the histograms.  Sensing: Stable    Pacing Threshold: Stable    Impedance: Stable  Battery Status: 8.6 yrs remain.  Atrial Arrhythmia: 5 PMT episodes. As=Vs rates of 120 bpm. This is not a new finding.  Ventricular Arrhythmia: None.     Care Plan: F/U Latitude NXT Remote PPM q 3 months. Gave patient results and next appt date over the phone.

## 2018-06-18 ENCOUNTER — OFFICE VISIT - HEALTHEAST (OUTPATIENT)
Dept: FAMILY MEDICINE | Facility: CLINIC | Age: 83
End: 2018-06-18

## 2018-06-18 ENCOUNTER — AMBULATORY - HEALTHEAST (OUTPATIENT)
Dept: CARDIOLOGY | Facility: CLINIC | Age: 83
End: 2018-06-18

## 2018-06-18 ENCOUNTER — COMMUNICATION - HEALTHEAST (OUTPATIENT)
Dept: FAMILY MEDICINE | Facility: CLINIC | Age: 83
End: 2018-06-18

## 2018-06-18 ENCOUNTER — OFFICE VISIT - HEALTHEAST (OUTPATIENT)
Dept: CARDIOLOGY | Facility: CLINIC | Age: 83
End: 2018-06-18

## 2018-06-18 DIAGNOSIS — I25.118 CORONARY ARTERY DISEASE OF NATIVE ARTERY OF NATIVE HEART WITH STABLE ANGINA PECTORIS (H): ICD-10-CM

## 2018-06-18 DIAGNOSIS — G89.29 CHRONIC PAIN: ICD-10-CM

## 2018-06-18 DIAGNOSIS — Z95.0 CARDIAC PACEMAKER IN SITU: ICD-10-CM

## 2018-06-18 DIAGNOSIS — I10 BENIGN ESSENTIAL HYPERTENSION: ICD-10-CM

## 2018-06-18 LAB
HCC DEVICE COMMENTS: NORMAL
HCC DEVICE IMPLANTING PROVIDER: NORMAL
HCC DEVICE MANUFACTURE: NORMAL
HCC DEVICE MODEL: NORMAL
HCC DEVICE SERIAL NUMBER: NORMAL
HCC DEVICE TYPE: NORMAL

## 2018-06-18 ASSESSMENT — MIFFLIN-ST. JEOR: SCORE: 1528.1

## 2018-06-21 ENCOUNTER — HOSPITAL ENCOUNTER (OUTPATIENT)
Dept: RADIOLOGY | Facility: HOSPITAL | Age: 83
Discharge: HOME | End: 2018-06-21
Attending: NEUROLOGICAL SURGERY

## 2018-06-21 ENCOUNTER — HOSPITAL ENCOUNTER (OUTPATIENT)
Dept: INTERVENTIONAL RADIOLOGY/VASCULAR | Facility: HOSPITAL | Age: 83
Discharge: HOME | End: 2018-06-21
Attending: NEUROLOGICAL SURGERY

## 2018-06-21 ENCOUNTER — HOSPITAL ENCOUNTER (OUTPATIENT)
Dept: CT IMAGING | Facility: HOSPITAL | Age: 83
Discharge: HOME | End: 2018-06-21
Attending: NEUROLOGICAL SURGERY

## 2018-06-21 DIAGNOSIS — R20.0 NUMBNESS AND TINGLING IN BOTH HANDS: ICD-10-CM

## 2018-06-21 DIAGNOSIS — R26.89 IMBALANCE: ICD-10-CM

## 2018-06-21 DIAGNOSIS — M47.812 CERVICAL SPONDYLOSIS WITHOUT MYELOPATHY: ICD-10-CM

## 2018-06-21 DIAGNOSIS — R20.2 NUMBNESS AND TINGLING IN BOTH HANDS: ICD-10-CM

## 2018-06-21 DIAGNOSIS — R20.2 NUMBNESS AND TINGLING OF BOTH LEGS BELOW KNEES: ICD-10-CM

## 2018-06-21 DIAGNOSIS — R20.0 NUMBNESS AND TINGLING OF BOTH LEGS BELOW KNEES: ICD-10-CM

## 2018-06-21 ASSESSMENT — MIFFLIN-ST. JEOR: SCORE: 1576.18

## 2018-06-22 ENCOUNTER — HOSPITAL ENCOUNTER (OUTPATIENT)
Dept: PHYSICAL MEDICINE AND REHAB | Facility: CLINIC | Age: 83
Discharge: HOME | End: 2018-06-22
Attending: PHYSICIAN ASSISTANT

## 2018-06-22 ENCOUNTER — HOSPITAL ENCOUNTER (OUTPATIENT)
Dept: PHYSICAL MEDICINE AND REHAB | Facility: CLINIC | Age: 83
Discharge: HOME | End: 2018-06-22
Attending: NEUROLOGICAL SURGERY

## 2018-06-22 DIAGNOSIS — M54.12 CERVICAL RADICULOPATHY: ICD-10-CM

## 2018-06-22 DIAGNOSIS — M43.12 SPONDYLOLISTHESIS OF CERVICAL REGION: ICD-10-CM

## 2018-06-22 DIAGNOSIS — M48.062 SPINAL STENOSIS OF LUMBAR REGION WITH NEUROGENIC CLAUDICATION: ICD-10-CM

## 2018-06-22 DIAGNOSIS — M54.2 CERVICAL SPINE PAIN: ICD-10-CM

## 2018-06-26 ENCOUNTER — HOSPITAL ENCOUNTER (OUTPATIENT)
Dept: PHYSICAL MEDICINE AND REHAB | Facility: CLINIC | Age: 83
Discharge: HOME | End: 2018-06-26
Attending: PHYSICIAN ASSISTANT

## 2018-06-26 DIAGNOSIS — E11.8 CONTROLLED TYPE 2 DIABETES MELLITUS WITH COMPLICATION, WITHOUT LONG-TERM CURRENT USE OF INSULIN (H): ICD-10-CM

## 2018-06-26 DIAGNOSIS — M48.062 SPINAL STENOSIS OF LUMBAR REGION WITH NEUROGENIC CLAUDICATION: ICD-10-CM

## 2018-06-26 LAB — GLUCOSE BLDC GLUCOMTR-MCNC: 156 MG/DL (ref 70–125)

## 2018-06-28 ENCOUNTER — COMMUNICATION - HEALTHEAST (OUTPATIENT)
Dept: FAMILY MEDICINE | Facility: CLINIC | Age: 83
End: 2018-06-28

## 2018-06-28 DIAGNOSIS — G89.29 CHRONIC PAIN: ICD-10-CM

## 2018-07-02 ENCOUNTER — COMMUNICATION - HEALTHEAST (OUTPATIENT)
Dept: FAMILY MEDICINE | Facility: CLINIC | Age: 83
End: 2018-07-02

## 2018-07-02 DIAGNOSIS — I10 BENIGN ESSENTIAL HYPERTENSION: ICD-10-CM

## 2018-07-03 ENCOUNTER — COMMUNICATION - HEALTHEAST (OUTPATIENT)
Dept: FAMILY MEDICINE | Facility: CLINIC | Age: 83
End: 2018-07-03

## 2018-07-03 DIAGNOSIS — M48.07 SPINAL STENOSIS OF LUMBOSACRAL REGION: ICD-10-CM

## 2018-07-03 DIAGNOSIS — G89.29 CHRONIC PAIN: ICD-10-CM

## 2018-07-06 ENCOUNTER — COMMUNICATION - HEALTHEAST (OUTPATIENT)
Dept: FAMILY MEDICINE | Facility: CLINIC | Age: 83
End: 2018-07-06

## 2018-07-06 DIAGNOSIS — I10 BENIGN ESSENTIAL HYPERTENSION: ICD-10-CM

## 2018-07-06 DIAGNOSIS — E78.2 MIXED HYPERLIPIDEMIA: ICD-10-CM

## 2018-07-06 DIAGNOSIS — G89.29 CHRONIC PAIN: ICD-10-CM

## 2018-07-11 ENCOUNTER — COMMUNICATION - HEALTHEAST (OUTPATIENT)
Dept: NURSING | Facility: CLINIC | Age: 83
End: 2018-07-11

## 2018-07-16 ENCOUNTER — COMMUNICATION - HEALTHEAST (OUTPATIENT)
Dept: NEUROSURGERY | Facility: CLINIC | Age: 83
End: 2018-07-16

## 2018-07-24 ENCOUNTER — AMBULATORY - HEALTHEAST (OUTPATIENT)
Dept: CARE COORDINATION | Facility: CLINIC | Age: 83
End: 2018-07-24

## 2018-07-25 ENCOUNTER — COMMUNICATION - HEALTHEAST (OUTPATIENT)
Dept: NURSING | Facility: CLINIC | Age: 83
End: 2018-07-25

## 2018-07-26 ENCOUNTER — OFFICE VISIT - HEALTHEAST (OUTPATIENT)
Dept: FAMILY MEDICINE | Facility: CLINIC | Age: 83
End: 2018-07-26

## 2018-07-26 DIAGNOSIS — E78.5 HYPERLIPIDEMIA LDL GOAL <100: ICD-10-CM

## 2018-07-26 DIAGNOSIS — R41.0 CONFUSION: ICD-10-CM

## 2018-07-26 DIAGNOSIS — G30.1 LATE ONSET ALZHEIMER'S DISEASE WITHOUT BEHAVIORAL DISTURBANCE (H): ICD-10-CM

## 2018-07-26 DIAGNOSIS — F02.80 LATE ONSET ALZHEIMER'S DISEASE WITHOUT BEHAVIORAL DISTURBANCE (H): ICD-10-CM

## 2018-07-26 DIAGNOSIS — K59.00 CONSTIPATION, UNSPECIFIED CONSTIPATION TYPE: ICD-10-CM

## 2018-07-26 DIAGNOSIS — N17.9 AKI (ACUTE KIDNEY INJURY) (H): ICD-10-CM

## 2018-07-26 DIAGNOSIS — I10 BENIGN ESSENTIAL HYPERTENSION: ICD-10-CM

## 2018-07-26 DIAGNOSIS — E11.8 CONTROLLED TYPE 2 DIABETES MELLITUS WITH COMPLICATION, WITHOUT LONG-TERM CURRENT USE OF INSULIN (H): ICD-10-CM

## 2018-07-26 DIAGNOSIS — B35.1 FUNGAL NAIL INFECTION: ICD-10-CM

## 2018-07-26 DIAGNOSIS — R44.3 HALLUCINATIONS: ICD-10-CM

## 2018-07-26 DIAGNOSIS — G89.4 CHRONIC PAIN SYNDROME: ICD-10-CM

## 2018-07-26 DIAGNOSIS — M48.07 SPINAL STENOSIS OF LUMBOSACRAL REGION: ICD-10-CM

## 2018-07-26 DIAGNOSIS — Z09 HOSPITAL DISCHARGE FOLLOW-UP: ICD-10-CM

## 2018-07-26 LAB
ALBUMIN SERPL-MCNC: 3.5 G/DL (ref 3.5–5)
ALP SERPL-CCNC: 85 U/L (ref 45–120)
ALT SERPL W P-5'-P-CCNC: 24 U/L (ref 0–45)
ANION GAP SERPL CALCULATED.3IONS-SCNC: 11 MMOL/L (ref 5–18)
AST SERPL W P-5'-P-CCNC: 17 U/L (ref 0–40)
BILIRUB SERPL-MCNC: 0.3 MG/DL (ref 0–1)
BUN SERPL-MCNC: 28 MG/DL (ref 8–28)
CALCIUM SERPL-MCNC: 9.2 MG/DL (ref 8.5–10.5)
CHLORIDE BLD-SCNC: 114 MMOL/L (ref 98–107)
CO2 SERPL-SCNC: 16 MMOL/L (ref 22–31)
CREAT SERPL-MCNC: 1.31 MG/DL (ref 0.7–1.3)
GFR SERPL CREATININE-BSD FRML MDRD: 52 ML/MIN/1.73M2
GLUCOSE BLD-MCNC: 150 MG/DL (ref 70–125)
HBA1C MFR BLD: 6.9 % (ref 3.5–6)
POTASSIUM BLD-SCNC: 4.4 MMOL/L (ref 3.5–5)
PROT SERPL-MCNC: 6.3 G/DL (ref 6–8)
SODIUM SERPL-SCNC: 141 MMOL/L (ref 136–145)

## 2018-07-27 ENCOUNTER — HOME CARE/HOSPICE - HEALTHEAST (OUTPATIENT)
Dept: HOME HEALTH SERVICES | Facility: HOME HEALTH | Age: 83
End: 2018-07-27

## 2018-07-27 ENCOUNTER — COMMUNICATION - HEALTHEAST (OUTPATIENT)
Dept: FAMILY MEDICINE | Facility: CLINIC | Age: 83
End: 2018-07-27

## 2018-07-29 ENCOUNTER — HOME CARE/HOSPICE - HEALTHEAST (OUTPATIENT)
Dept: HOME HEALTH SERVICES | Facility: HOME HEALTH | Age: 83
End: 2018-07-29

## 2018-07-30 ENCOUNTER — COMMUNICATION - HEALTHEAST (OUTPATIENT)
Dept: HOME HEALTH SERVICES | Facility: HOME HEALTH | Age: 83
End: 2018-07-30

## 2018-07-31 ENCOUNTER — HOME CARE/HOSPICE - HEALTHEAST (OUTPATIENT)
Dept: HOME HEALTH SERVICES | Facility: HOME HEALTH | Age: 83
End: 2018-07-31

## 2018-08-01 ENCOUNTER — COMMUNICATION - HEALTHEAST (OUTPATIENT)
Dept: FAMILY MEDICINE | Facility: CLINIC | Age: 83
End: 2018-08-01

## 2018-08-01 DIAGNOSIS — F32.A DEPRESSION: ICD-10-CM

## 2018-08-01 DIAGNOSIS — I10 BENIGN ESSENTIAL HYPERTENSION: ICD-10-CM

## 2018-08-02 ENCOUNTER — COMMUNICATION - HEALTHEAST (OUTPATIENT)
Dept: FAMILY MEDICINE | Facility: CLINIC | Age: 83
End: 2018-08-02

## 2018-08-03 ENCOUNTER — COMMUNICATION - HEALTHEAST (OUTPATIENT)
Dept: HOME HEALTH SERVICES | Facility: HOME HEALTH | Age: 83
End: 2018-08-03

## 2018-08-06 ENCOUNTER — HOME CARE/HOSPICE - HEALTHEAST (OUTPATIENT)
Dept: HOME HEALTH SERVICES | Facility: HOME HEALTH | Age: 83
End: 2018-08-06

## 2018-08-06 ENCOUNTER — COMMUNICATION - HEALTHEAST (OUTPATIENT)
Dept: FAMILY MEDICINE | Facility: CLINIC | Age: 83
End: 2018-08-06

## 2018-08-07 ENCOUNTER — COMMUNICATION - HEALTHEAST (OUTPATIENT)
Dept: FAMILY MEDICINE | Facility: CLINIC | Age: 83
End: 2018-08-07

## 2018-08-09 ENCOUNTER — HOME CARE/HOSPICE - HEALTHEAST (OUTPATIENT)
Dept: HOME HEALTH SERVICES | Facility: HOME HEALTH | Age: 83
End: 2018-08-09

## 2018-08-10 ENCOUNTER — OFFICE VISIT - HEALTHEAST (OUTPATIENT)
Dept: FAMILY MEDICINE | Facility: CLINIC | Age: 83
End: 2018-08-10

## 2018-08-10 ENCOUNTER — COMMUNICATION - HEALTHEAST (OUTPATIENT)
Dept: FAMILY MEDICINE | Facility: CLINIC | Age: 83
End: 2018-08-10

## 2018-08-10 DIAGNOSIS — H61.23 BILATERAL IMPACTED CERUMEN: ICD-10-CM

## 2018-08-10 DIAGNOSIS — R44.3 HALLUCINATIONS: ICD-10-CM

## 2018-08-10 DIAGNOSIS — I87.2 VENOUS STASIS DERMATITIS OF BOTH LOWER EXTREMITIES: ICD-10-CM

## 2018-08-10 DIAGNOSIS — K59.00 CONSTIPATION, UNSPECIFIED CONSTIPATION TYPE: ICD-10-CM

## 2018-08-10 DIAGNOSIS — G47.00 INSOMNIA, UNSPECIFIED TYPE: ICD-10-CM

## 2018-08-10 DIAGNOSIS — G89.29 OTHER CHRONIC PAIN: ICD-10-CM

## 2018-08-10 DIAGNOSIS — I10 BENIGN ESSENTIAL HYPERTENSION: ICD-10-CM

## 2018-08-10 DIAGNOSIS — F02.80 LATE ONSET ALZHEIMER'S DISEASE WITHOUT BEHAVIORAL DISTURBANCE (H): ICD-10-CM

## 2018-08-10 DIAGNOSIS — G30.1 LATE ONSET ALZHEIMER'S DISEASE WITHOUT BEHAVIORAL DISTURBANCE (H): ICD-10-CM

## 2018-08-15 ENCOUNTER — HOME CARE/HOSPICE - HEALTHEAST (OUTPATIENT)
Dept: HOME HEALTH SERVICES | Facility: HOME HEALTH | Age: 83
End: 2018-08-15

## 2018-08-15 ENCOUNTER — COMMUNICATION - HEALTHEAST (OUTPATIENT)
Dept: FAMILY MEDICINE | Facility: CLINIC | Age: 83
End: 2018-08-15

## 2018-08-15 DIAGNOSIS — K59.00 CONSTIPATION, UNSPECIFIED CONSTIPATION TYPE: ICD-10-CM

## 2018-08-17 ENCOUNTER — HOME CARE/HOSPICE - HEALTHEAST (OUTPATIENT)
Dept: HOME HEALTH SERVICES | Facility: HOME HEALTH | Age: 83
End: 2018-08-17

## 2018-08-21 ENCOUNTER — HOME CARE/HOSPICE - HEALTHEAST (OUTPATIENT)
Dept: HOME HEALTH SERVICES | Facility: HOME HEALTH | Age: 83
End: 2018-08-21

## 2018-08-23 ENCOUNTER — HOME CARE/HOSPICE - HEALTHEAST (OUTPATIENT)
Dept: HOME HEALTH SERVICES | Facility: HOME HEALTH | Age: 83
End: 2018-08-23

## 2018-08-24 ENCOUNTER — COMMUNICATION - HEALTHEAST (OUTPATIENT)
Dept: FAMILY MEDICINE | Facility: CLINIC | Age: 83
End: 2018-08-24

## 2018-08-27 ENCOUNTER — COMMUNICATION - HEALTHEAST (OUTPATIENT)
Dept: FAMILY MEDICINE | Facility: CLINIC | Age: 83
End: 2018-08-27

## 2018-08-28 ENCOUNTER — HOSPITAL ENCOUNTER (OUTPATIENT)
Dept: NEUROLOGY | Facility: CLINIC | Age: 83
Setting detail: THERAPIES SERIES
Discharge: STILL ADMITTED OR ADMINISTRATIVE | End: 2018-08-28
Attending: SOCIAL WORKER

## 2018-08-28 ENCOUNTER — HOSPITAL ENCOUNTER (OUTPATIENT)
Dept: NEUROLOGY | Facility: CLINIC | Age: 83
Setting detail: THERAPIES SERIES
Discharge: STILL ADMITTED OR ADMINISTRATIVE | End: 2018-08-28
Attending: FAMILY MEDICINE

## 2018-08-28 DIAGNOSIS — G31.83 LEWY BODY DEMENTIA (H): ICD-10-CM

## 2018-08-28 DIAGNOSIS — F02.80 LEWY BODY DEMENTIA (H): ICD-10-CM

## 2018-08-28 DIAGNOSIS — R41.89 COGNITIVE AND BEHAVIORAL CHANGES: ICD-10-CM

## 2018-08-28 DIAGNOSIS — R46.89 COGNITIVE AND BEHAVIORAL CHANGES: ICD-10-CM

## 2018-08-29 ENCOUNTER — HOME CARE/HOSPICE - HEALTHEAST (OUTPATIENT)
Dept: HOME HEALTH SERVICES | Facility: HOME HEALTH | Age: 83
End: 2018-08-29

## 2018-08-31 ENCOUNTER — HOME CARE/HOSPICE - HEALTHEAST (OUTPATIENT)
Dept: HOME HEALTH SERVICES | Facility: HOME HEALTH | Age: 83
End: 2018-08-31

## 2018-09-05 ENCOUNTER — COMMUNICATION - HEALTHEAST (OUTPATIENT)
Dept: FAMILY MEDICINE | Facility: CLINIC | Age: 83
End: 2018-09-05

## 2018-09-05 ENCOUNTER — HOME CARE/HOSPICE - HEALTHEAST (OUTPATIENT)
Dept: HOME HEALTH SERVICES | Facility: HOME HEALTH | Age: 83
End: 2018-09-05

## 2018-09-06 ENCOUNTER — HOME CARE/HOSPICE - HEALTHEAST (OUTPATIENT)
Dept: HOME HEALTH SERVICES | Facility: HOME HEALTH | Age: 83
End: 2018-09-06

## 2018-09-10 ENCOUNTER — HOME CARE/HOSPICE - HEALTHEAST (OUTPATIENT)
Dept: HOME HEALTH SERVICES | Facility: HOME HEALTH | Age: 83
End: 2018-09-10

## 2018-09-11 ENCOUNTER — HOSPITAL ENCOUNTER (OUTPATIENT)
Dept: NEUROLOGY | Facility: CLINIC | Age: 83
Setting detail: THERAPIES SERIES
Discharge: STILL ADMITTED OR ADMINISTRATIVE | End: 2018-09-11
Attending: PSYCHIATRY & NEUROLOGY

## 2018-09-11 ENCOUNTER — COMMUNICATION - HEALTHEAST (OUTPATIENT)
Dept: HOME HEALTH SERVICES | Facility: HOME HEALTH | Age: 83
End: 2018-09-11

## 2018-09-14 ENCOUNTER — HOME CARE/HOSPICE - HEALTHEAST (OUTPATIENT)
Dept: HOME HEALTH SERVICES | Facility: HOME HEALTH | Age: 83
End: 2018-09-14

## 2018-09-14 ENCOUNTER — COMMUNICATION - HEALTHEAST (OUTPATIENT)
Dept: FAMILY MEDICINE | Facility: CLINIC | Age: 83
End: 2018-09-14

## 2018-09-17 ENCOUNTER — HOME CARE/HOSPICE - HEALTHEAST (OUTPATIENT)
Dept: HOME HEALTH SERVICES | Facility: HOME HEALTH | Age: 83
End: 2018-09-17

## 2018-09-17 ENCOUNTER — COMMUNICATION - HEALTHEAST (OUTPATIENT)
Dept: HOME HEALTH SERVICES | Facility: HOME HEALTH | Age: 83
End: 2018-09-17

## 2018-09-18 ENCOUNTER — HOME CARE/HOSPICE - HEALTHEAST (OUTPATIENT)
Dept: HOME HEALTH SERVICES | Facility: HOME HEALTH | Age: 83
End: 2018-09-18

## 2018-09-23 ENCOUNTER — HOME CARE/HOSPICE - HEALTHEAST (OUTPATIENT)
Dept: HOME HEALTH SERVICES | Facility: HOME HEALTH | Age: 83
End: 2018-09-23

## 2018-09-24 ENCOUNTER — COMMUNICATION - HEALTHEAST (OUTPATIENT)
Dept: FAMILY MEDICINE | Facility: CLINIC | Age: 83
End: 2018-09-24

## 2018-09-25 ENCOUNTER — OFFICE VISIT - HEALTHEAST (OUTPATIENT)
Dept: GERIATRICS | Facility: CLINIC | Age: 83
End: 2018-09-25

## 2018-09-25 DIAGNOSIS — R33.8 BENIGN PROSTATIC HYPERPLASIA WITH URINARY RETENTION: ICD-10-CM

## 2018-09-25 DIAGNOSIS — R06.02 SHORTNESS OF BREATH: ICD-10-CM

## 2018-09-25 DIAGNOSIS — F02.80 LATE ONSET ALZHEIMER'S DISEASE WITHOUT BEHAVIORAL DISTURBANCE (H): ICD-10-CM

## 2018-09-25 DIAGNOSIS — E11.9 CONTROLLED TYPE 2 DIABETES MELLITUS WITHOUT COMPLICATION, WITHOUT LONG-TERM CURRENT USE OF INSULIN (H): ICD-10-CM

## 2018-09-25 DIAGNOSIS — N40.1 BENIGN PROSTATIC HYPERPLASIA WITH URINARY RETENTION: ICD-10-CM

## 2018-09-25 DIAGNOSIS — K59.00 CONSTIPATION, UNSPECIFIED CONSTIPATION TYPE: ICD-10-CM

## 2018-09-25 DIAGNOSIS — G30.1 LATE ONSET ALZHEIMER'S DISEASE WITHOUT BEHAVIORAL DISTURBANCE (H): ICD-10-CM

## 2018-09-25 DIAGNOSIS — I25.118 CORONARY ARTERY DISEASE OF NATIVE ARTERY OF NATIVE HEART WITH STABLE ANGINA PECTORIS (H): ICD-10-CM

## 2018-09-25 ASSESSMENT — MIFFLIN-ST. JEOR: SCORE: 1553.5

## 2018-09-27 ENCOUNTER — OFFICE VISIT - HEALTHEAST (OUTPATIENT)
Dept: GERIATRICS | Facility: CLINIC | Age: 83
End: 2018-09-27

## 2018-09-27 DIAGNOSIS — E11.9 CONTROLLED TYPE 2 DIABETES MELLITUS WITHOUT COMPLICATION, WITHOUT LONG-TERM CURRENT USE OF INSULIN (H): ICD-10-CM

## 2018-09-27 DIAGNOSIS — R06.02 SHORTNESS OF BREATH: ICD-10-CM

## 2018-09-27 DIAGNOSIS — G30.1 LATE ONSET ALZHEIMER'S DISEASE WITHOUT BEHAVIORAL DISTURBANCE (H): ICD-10-CM

## 2018-09-27 DIAGNOSIS — I25.118 CORONARY ARTERY DISEASE OF NATIVE ARTERY OF NATIVE HEART WITH STABLE ANGINA PECTORIS (H): ICD-10-CM

## 2018-09-27 DIAGNOSIS — N40.1 BENIGN PROSTATIC HYPERPLASIA WITH URINARY RETENTION: ICD-10-CM

## 2018-09-27 DIAGNOSIS — R33.8 BENIGN PROSTATIC HYPERPLASIA WITH URINARY RETENTION: ICD-10-CM

## 2018-09-27 DIAGNOSIS — F02.80 LATE ONSET ALZHEIMER'S DISEASE WITHOUT BEHAVIORAL DISTURBANCE (H): ICD-10-CM

## 2018-09-28 ENCOUNTER — AMBULATORY - HEALTHEAST (OUTPATIENT)
Dept: GERIATRICS | Facility: CLINIC | Age: 83
End: 2018-09-28

## 2018-10-01 ENCOUNTER — OFFICE VISIT - HEALTHEAST (OUTPATIENT)
Dept: GERIATRICS | Facility: CLINIC | Age: 83
End: 2018-10-01

## 2018-10-01 DIAGNOSIS — I25.10 CAD (CORONARY ARTERY DISEASE): ICD-10-CM

## 2018-10-01 DIAGNOSIS — E11.9 DIABETES MELLITUS (H): ICD-10-CM

## 2018-10-01 DIAGNOSIS — F03.90 DEMENTIA (H): ICD-10-CM

## 2018-10-01 DIAGNOSIS — R33.9 URINARY RETENTION: ICD-10-CM

## 2018-10-01 DIAGNOSIS — I10 ESSENTIAL HYPERTENSION: ICD-10-CM

## 2018-10-02 ENCOUNTER — OFFICE VISIT - HEALTHEAST (OUTPATIENT)
Dept: GERIATRICS | Facility: CLINIC | Age: 83
End: 2018-10-02

## 2018-10-02 DIAGNOSIS — R06.02 SOB (SHORTNESS OF BREATH): ICD-10-CM

## 2018-10-02 DIAGNOSIS — K59.00 CONSTIPATION, UNSPECIFIED CONSTIPATION TYPE: ICD-10-CM

## 2018-10-02 DIAGNOSIS — E11.9 CONTROLLED TYPE 2 DIABETES MELLITUS WITHOUT COMPLICATION, WITHOUT LONG-TERM CURRENT USE OF INSULIN (H): ICD-10-CM

## 2018-10-02 DIAGNOSIS — I25.118 CORONARY ARTERY DISEASE OF NATIVE ARTERY OF NATIVE HEART WITH STABLE ANGINA PECTORIS (H): ICD-10-CM

## 2018-10-02 DIAGNOSIS — N40.1 BENIGN PROSTATIC HYPERPLASIA WITH URINARY RETENTION: ICD-10-CM

## 2018-10-02 DIAGNOSIS — R33.8 BENIGN PROSTATIC HYPERPLASIA WITH URINARY RETENTION: ICD-10-CM

## 2018-10-02 DIAGNOSIS — F02.80 LATE ONSET ALZHEIMER'S DISEASE WITHOUT BEHAVIORAL DISTURBANCE (H): ICD-10-CM

## 2018-10-02 DIAGNOSIS — G30.1 LATE ONSET ALZHEIMER'S DISEASE WITHOUT BEHAVIORAL DISTURBANCE (H): ICD-10-CM

## 2018-10-02 ASSESSMENT — MIFFLIN-ST. JEOR: SCORE: 1526.29

## 2018-10-09 ENCOUNTER — COMMUNICATION - HEALTHEAST (OUTPATIENT)
Dept: FAMILY MEDICINE | Facility: CLINIC | Age: 83
End: 2018-10-09

## 2018-10-09 ENCOUNTER — OFFICE VISIT - HEALTHEAST (OUTPATIENT)
Dept: GERIATRICS | Facility: CLINIC | Age: 83
End: 2018-10-09

## 2018-10-09 DIAGNOSIS — K59.00 CONSTIPATION, UNSPECIFIED CONSTIPATION TYPE: ICD-10-CM

## 2018-10-09 DIAGNOSIS — E11.9 CONTROLLED TYPE 2 DIABETES MELLITUS WITHOUT COMPLICATION, WITHOUT LONG-TERM CURRENT USE OF INSULIN (H): ICD-10-CM

## 2018-10-09 DIAGNOSIS — R06.02 SOB (SHORTNESS OF BREATH): ICD-10-CM

## 2018-10-09 DIAGNOSIS — I25.118 CORONARY ARTERY DISEASE OF NATIVE ARTERY OF NATIVE HEART WITH STABLE ANGINA PECTORIS (H): ICD-10-CM

## 2018-10-09 DIAGNOSIS — R33.8 BENIGN PROSTATIC HYPERPLASIA WITH URINARY RETENTION: ICD-10-CM

## 2018-10-09 DIAGNOSIS — F02.80 LATE ONSET ALZHEIMER'S DISEASE WITHOUT BEHAVIORAL DISTURBANCE (H): ICD-10-CM

## 2018-10-09 DIAGNOSIS — G30.1 LATE ONSET ALZHEIMER'S DISEASE WITHOUT BEHAVIORAL DISTURBANCE (H): ICD-10-CM

## 2018-10-09 DIAGNOSIS — N40.1 BENIGN PROSTATIC HYPERPLASIA WITH URINARY RETENTION: ICD-10-CM

## 2018-10-09 ASSESSMENT — MIFFLIN-ST. JEOR: SCORE: 1522.66

## 2018-10-12 ENCOUNTER — COMMUNICATION - HEALTHEAST (OUTPATIENT)
Dept: GERIATRICS | Facility: CLINIC | Age: 83
End: 2018-10-12

## 2018-10-16 ENCOUNTER — OFFICE VISIT - HEALTHEAST (OUTPATIENT)
Dept: GERIATRICS | Facility: CLINIC | Age: 83
End: 2018-10-16

## 2018-10-16 DIAGNOSIS — R33.8 BENIGN PROSTATIC HYPERPLASIA WITH URINARY RETENTION: ICD-10-CM

## 2018-10-16 DIAGNOSIS — I25.118 CORONARY ARTERY DISEASE OF NATIVE ARTERY OF NATIVE HEART WITH STABLE ANGINA PECTORIS (H): ICD-10-CM

## 2018-10-16 DIAGNOSIS — G30.1 LATE ONSET ALZHEIMER'S DISEASE WITH BEHAVIORAL DISTURBANCE (H): ICD-10-CM

## 2018-10-16 DIAGNOSIS — K59.00 CONSTIPATION, UNSPECIFIED CONSTIPATION TYPE: ICD-10-CM

## 2018-10-16 DIAGNOSIS — F02.818 LATE ONSET ALZHEIMER'S DISEASE WITH BEHAVIORAL DISTURBANCE (H): ICD-10-CM

## 2018-10-16 DIAGNOSIS — E11.9 CONTROLLED TYPE 2 DIABETES MELLITUS WITHOUT COMPLICATION, WITHOUT LONG-TERM CURRENT USE OF INSULIN (H): ICD-10-CM

## 2018-10-16 DIAGNOSIS — N40.1 BENIGN PROSTATIC HYPERPLASIA WITH URINARY RETENTION: ICD-10-CM

## 2018-10-16 ASSESSMENT — MIFFLIN-ST. JEOR: SCORE: 1538.08

## 2018-10-19 ENCOUNTER — AMBULATORY - HEALTHEAST (OUTPATIENT)
Dept: GERIATRICS | Facility: CLINIC | Age: 83
End: 2018-10-19

## 2018-10-19 ENCOUNTER — COMMUNICATION - HEALTHEAST (OUTPATIENT)
Dept: FAMILY MEDICINE | Facility: CLINIC | Age: 83
End: 2018-10-19

## 2018-10-22 ENCOUNTER — COMMUNICATION - HEALTHEAST (OUTPATIENT)
Dept: FAMILY MEDICINE | Facility: CLINIC | Age: 83
End: 2018-10-22

## 2018-10-22 ENCOUNTER — AMBULATORY - HEALTHEAST (OUTPATIENT)
Dept: CARDIOLOGY | Facility: CLINIC | Age: 83
End: 2018-10-22

## 2018-10-22 DIAGNOSIS — Z95.0 CARDIAC PACEMAKER IN SITU: ICD-10-CM

## 2018-10-25 ENCOUNTER — COMMUNICATION - HEALTHEAST (OUTPATIENT)
Dept: FAMILY MEDICINE | Facility: CLINIC | Age: 83
End: 2018-10-25

## 2018-10-29 ENCOUNTER — HOSPITAL ENCOUNTER (OUTPATIENT)
Dept: NEUROLOGY | Facility: CLINIC | Age: 83
Setting detail: THERAPIES SERIES
Discharge: STILL ADMITTED OR ADMINISTRATIVE | End: 2018-10-29
Attending: PSYCHIATRY & NEUROLOGY

## 2018-11-01 ENCOUNTER — COMMUNICATION - HEALTHEAST (OUTPATIENT)
Dept: FAMILY MEDICINE | Facility: CLINIC | Age: 83
End: 2018-11-01

## 2018-11-05 ENCOUNTER — COMMUNICATION - HEALTHEAST (OUTPATIENT)
Dept: PHARMACY | Facility: CLINIC | Age: 83
End: 2018-11-05

## 2018-11-06 ENCOUNTER — RECORDS - HEALTHEAST (OUTPATIENT)
Dept: LAB | Facility: CLINIC | Age: 83
End: 2018-11-06

## 2018-11-06 LAB
ANION GAP SERPL CALCULATED.3IONS-SCNC: 10 MMOL/L (ref 5–18)
BUN SERPL-MCNC: 39 MG/DL (ref 8–28)
CALCIUM SERPL-MCNC: 9.2 MG/DL (ref 8.5–10.5)
CHLORIDE BLD-SCNC: 106 MMOL/L (ref 98–107)
CO2 SERPL-SCNC: 20 MMOL/L (ref 22–31)
CREAT SERPL-MCNC: 1.66 MG/DL (ref 0.7–1.3)
ERYTHROCYTE [DISTWIDTH] IN BLOOD BY AUTOMATED COUNT: 13.3 % (ref 11–14.5)
GFR SERPL CREATININE-BSD FRML MDRD: 39 ML/MIN/1.73M2
GLUCOSE BLD-MCNC: 170 MG/DL (ref 70–125)
HCT VFR BLD AUTO: 40.9 % (ref 40–54)
HGB BLD-MCNC: 13.2 G/DL (ref 14–18)
MCH RBC QN AUTO: 30.6 PG (ref 27–34)
MCHC RBC AUTO-ENTMCNC: 32.3 G/DL (ref 32–36)
MCV RBC AUTO: 95 FL (ref 80–100)
PLATELET # BLD AUTO: 211 THOU/UL (ref 140–440)
PMV BLD AUTO: 10.1 FL (ref 8.5–12.5)
POTASSIUM BLD-SCNC: 4.4 MMOL/L (ref 3.5–5)
RBC # BLD AUTO: 4.31 MILL/UL (ref 4.4–6.2)
SODIUM SERPL-SCNC: 136 MMOL/L (ref 136–145)
TSH SERPL DL<=0.005 MIU/L-ACNC: 0.86 UIU/ML (ref 0.3–5)
VIT B12 SERPL-MCNC: 315 PG/ML (ref 213–816)
WBC: 7.9 THOU/UL (ref 4–11)

## 2018-11-07 LAB
25(OH)D3 SERPL-MCNC: 12.6 NG/ML (ref 30–80)
HBA1C MFR BLD: 7.6 % (ref 4.2–6.1)

## 2018-11-09 ENCOUNTER — COMMUNICATION - HEALTHEAST (OUTPATIENT)
Dept: FAMILY MEDICINE | Facility: CLINIC | Age: 83
End: 2018-11-09

## 2019-01-01 ENCOUNTER — AMBULATORY - HEALTHEAST (OUTPATIENT)
Dept: CARDIOLOGY | Facility: CLINIC | Age: 84
End: 2019-01-01

## 2019-01-01 ENCOUNTER — RECORDS - HEALTHEAST (OUTPATIENT)
Dept: LAB | Facility: CLINIC | Age: 84
End: 2019-01-01

## 2019-01-01 ENCOUNTER — COMMUNICATION - HEALTHEAST (OUTPATIENT)
Dept: ADMINISTRATIVE | Facility: CLINIC | Age: 84
End: 2019-01-01

## 2019-01-01 DIAGNOSIS — Z95.0 CARDIAC PACEMAKER IN SITU: ICD-10-CM

## 2019-01-01 LAB
25(OH)D3 SERPL-MCNC: 27.4 NG/ML (ref 30–80)
25(OH)D3 SERPL-MCNC: 28.6 NG/ML (ref 30–80)
ALBUMIN UR-MCNC: ABNORMAL MG/DL
ANION GAP SERPL CALCULATED.3IONS-SCNC: 10 MMOL/L (ref 5–18)
ANION GAP SERPL CALCULATED.3IONS-SCNC: 8 MMOL/L (ref 5–18)
APPEARANCE UR: ABNORMAL
BACTERIA #/AREA URNS HPF: ABNORMAL HPF
BACTERIA SPEC CULT: ABNORMAL
BACTERIA SPEC CULT: ABNORMAL
BILIRUB UR QL STRIP: NEGATIVE
BUN SERPL-MCNC: 24 MG/DL (ref 8–28)
BUN SERPL-MCNC: 26 MG/DL (ref 8–28)
CALCIUM SERPL-MCNC: 10 MG/DL (ref 8.5–10.5)
CALCIUM SERPL-MCNC: 10 MG/DL (ref 8.5–10.5)
CHLORIDE BLD-SCNC: 107 MMOL/L (ref 98–107)
CHLORIDE BLD-SCNC: 108 MMOL/L (ref 98–107)
CO2 SERPL-SCNC: 22 MMOL/L (ref 22–31)
CO2 SERPL-SCNC: 24 MMOL/L (ref 22–31)
COLOR UR AUTO: YELLOW
CREAT SERPL-MCNC: 1.28 MG/DL (ref 0.7–1.3)
CREAT SERPL-MCNC: 1.39 MG/DL (ref 0.7–1.3)
ERYTHROCYTE [DISTWIDTH] IN BLOOD BY AUTOMATED COUNT: 14.9 % (ref 11–14.5)
GFR SERPL CREATININE-BSD FRML MDRD: 48 ML/MIN/1.73M2
GFR SERPL CREATININE-BSD FRML MDRD: 53 ML/MIN/1.73M2
GLUCOSE BLD-MCNC: 141 MG/DL (ref 70–125)
GLUCOSE BLD-MCNC: 159 MG/DL (ref 70–125)
GLUCOSE UR STRIP-MCNC: NEGATIVE MG/DL
HBA1C MFR BLD: 7 % (ref 4.2–6.1)
HBA1C MFR BLD: 7.1 % (ref 4.2–6.1)
HCC DEVICE COMMENTS: NORMAL
HCC DEVICE IMPLANTING PROVIDER: NORMAL
HCC DEVICE MANUFACTURE: NORMAL
HCC DEVICE MODEL: NORMAL
HCC DEVICE SERIAL NUMBER: NORMAL
HCC DEVICE TYPE: NORMAL
HCT VFR BLD AUTO: 44.3 % (ref 40–54)
HGB BLD-MCNC: 13.9 G/DL (ref 14–18)
HGB BLD-MCNC: 14.3 G/DL (ref 14–18)
HGB UR QL STRIP: NEGATIVE
HYALINE CASTS #/AREA URNS LPF: ABNORMAL LPF
KETONES UR STRIP-MCNC: NEGATIVE MG/DL
LEUKOCYTE ESTERASE UR QL STRIP: NEGATIVE
MAGNESIUM SERPL-MCNC: 2.2 MG/DL (ref 1.8–2.6)
MCH RBC QN AUTO: 30.9 PG (ref 27–34)
MCHC RBC AUTO-ENTMCNC: 32.3 G/DL (ref 32–36)
MCV RBC AUTO: 96 FL (ref 80–100)
MUCOUS THREADS #/AREA URNS LPF: ABNORMAL LPF
NITRATE UR QL: NEGATIVE
PH UR STRIP: 6 [PH] (ref 4.5–8)
PLATELET # BLD AUTO: 234 THOU/UL (ref 140–440)
PMV BLD AUTO: 9.8 FL (ref 8.5–12.5)
POTASSIUM BLD-SCNC: 4 MMOL/L (ref 3.5–5)
POTASSIUM BLD-SCNC: 4 MMOL/L (ref 3.5–5)
PSA SERPL-MCNC: 17.9 NG/ML (ref 0–6.5)
RBC # BLD AUTO: 4.63 MILL/UL (ref 4.4–6.2)
RBC #/AREA URNS AUTO: ABNORMAL HPF
SODIUM SERPL-SCNC: 139 MMOL/L (ref 136–145)
SODIUM SERPL-SCNC: 140 MMOL/L (ref 136–145)
SP GR UR STRIP: 1.02 (ref 1–1.03)
SQUAMOUS #/AREA URNS AUTO: ABNORMAL LPF
UROBILINOGEN UR STRIP-ACNC: ABNORMAL
VIT B12 SERPL-MCNC: 318 PG/ML (ref 213–816)
VIT B12 SERPL-MCNC: 396 PG/ML (ref 213–816)
WBC #/AREA URNS AUTO: ABNORMAL HPF
WBC: 8.6 THOU/UL (ref 4–11)

## 2019-01-29 ENCOUNTER — AMBULATORY - HEALTHEAST (OUTPATIENT)
Dept: CARDIOLOGY | Facility: CLINIC | Age: 84
End: 2019-01-29

## 2019-01-29 DIAGNOSIS — Z95.0 CARDIAC PACEMAKER IN SITU: ICD-10-CM

## 2019-03-06 LAB — PSA SERPL-MCNC: 17.7 NG/ML (ref 0–6.5)

## 2019-03-11 ENCOUNTER — HOSPITAL ENCOUNTER (OUTPATIENT)
Dept: NEUROLOGY | Facility: CLINIC | Age: 84
Setting detail: THERAPIES SERIES
Discharge: STILL ADMITTED OR ADMINISTRATIVE | End: 2019-03-11
Attending: PSYCHIATRY & NEUROLOGY

## 2019-03-11 DIAGNOSIS — F02.818 LEWY BODY DEMENTIA WITH BEHAVIORAL DISTURBANCE (H): ICD-10-CM

## 2019-03-11 DIAGNOSIS — G31.83 LEWY BODY DEMENTIA WITH BEHAVIORAL DISTURBANCE (H): ICD-10-CM

## 2020-01-01 ENCOUNTER — AMBULATORY - HEALTHEAST (OUTPATIENT)
Dept: CARDIOLOGY | Facility: CLINIC | Age: 85
End: 2020-01-01

## 2020-01-01 ENCOUNTER — RECORDS - HEALTHEAST (OUTPATIENT)
Dept: LAB | Facility: CLINIC | Age: 85
End: 2020-01-01

## 2020-01-01 ENCOUNTER — HOME CARE/HOSPICE - HEALTHEAST (OUTPATIENT)
Dept: HOSPICE | Facility: HOSPICE | Age: 85
End: 2020-01-01

## 2020-01-01 DIAGNOSIS — Z95.0 CARDIAC PACEMAKER IN SITU: ICD-10-CM

## 2020-01-01 DIAGNOSIS — I44.2 ATRIOVENTRICULAR BLOCK, COMPLETE (H): ICD-10-CM

## 2020-01-01 LAB
25(OH)D3 SERPL-MCNC: 30.8 NG/ML (ref 30–80)
ALBUMIN SERPL-MCNC: 4.2 G/DL (ref 3.5–5)
ALP SERPL-CCNC: 88 U/L (ref 45–120)
ALT SERPL W P-5'-P-CCNC: 23 U/L (ref 0–45)
ANION GAP SERPL CALCULATED.3IONS-SCNC: 12 MMOL/L (ref 5–18)
AST SERPL W P-5'-P-CCNC: 20 U/L (ref 0–40)
BILIRUB SERPL-MCNC: 0.4 MG/DL (ref 0–1)
BUN SERPL-MCNC: 25 MG/DL (ref 8–28)
CALCIUM SERPL-MCNC: 10.1 MG/DL (ref 8.5–10.5)
CHLORIDE BLD-SCNC: 107 MMOL/L (ref 98–107)
CO2 SERPL-SCNC: 21 MMOL/L (ref 22–31)
CREAT SERPL-MCNC: 1.5 MG/DL (ref 0.7–1.3)
ERYTHROCYTE [DISTWIDTH] IN BLOOD BY AUTOMATED COUNT: 14.3 % (ref 11–14.5)
GFR SERPL CREATININE-BSD FRML MDRD: 44 ML/MIN/1.73M2
GLUCOSE BLD-MCNC: 164 MG/DL (ref 70–125)
HBA1C MFR BLD: 6.6 %
HCC DEVICE COMMENTS: NORMAL
HCC DEVICE IMPLANTING PROVIDER: NORMAL
HCC DEVICE MANUFACTURE: NORMAL
HCC DEVICE MODEL: NORMAL
HCC DEVICE SERIAL NUMBER: NORMAL
HCC DEVICE TYPE: NORMAL
HCT VFR BLD AUTO: 48.7 % (ref 40–54)
HGB BLD-MCNC: 15.7 G/DL (ref 14–18)
MCH RBC QN AUTO: 31.4 PG (ref 27–34)
MCHC RBC AUTO-ENTMCNC: 32.2 G/DL (ref 32–36)
MCV RBC AUTO: 97 FL (ref 80–100)
PLATELET # BLD AUTO: 202 THOU/UL (ref 140–440)
PMV BLD AUTO: 10.2 FL (ref 8.5–12.5)
POTASSIUM BLD-SCNC: 4.4 MMOL/L (ref 3.5–5)
PROT SERPL-MCNC: 8 G/DL (ref 6–8)
PSA SERPL-MCNC: 20.5 NG/ML (ref 0–6.5)
RBC # BLD AUTO: 5 MILL/UL (ref 4.4–6.2)
SODIUM SERPL-SCNC: 140 MMOL/L (ref 136–145)
TSH SERPL DL<=0.005 MIU/L-ACNC: 1.4 UIU/ML (ref 0.3–5)
WBC: 8.1 THOU/UL (ref 4–11)

## 2021-06-01 VITALS — HEIGHT: 67 IN | BODY MASS INDEX: 31.19 KG/M2 | WEIGHT: 198.7 LBS

## 2021-06-01 VITALS — BODY MASS INDEX: 32.89 KG/M2 | WEIGHT: 210 LBS

## 2021-06-01 VITALS — WEIGHT: 210 LBS | BODY MASS INDEX: 33.75 KG/M2 | HEIGHT: 66 IN

## 2021-06-01 VITALS — HEIGHT: 67 IN | WEIGHT: 198 LBS | BODY MASS INDEX: 31.08 KG/M2

## 2021-06-01 VITALS — WEIGHT: 213 LBS | BODY MASS INDEX: 30.56 KG/M2

## 2021-06-01 VITALS — BODY MASS INDEX: 29.41 KG/M2 | WEIGHT: 205 LBS

## 2021-06-01 VITALS — BODY MASS INDEX: 32.61 KG/M2 | HEIGHT: 66 IN | WEIGHT: 202.9 LBS

## 2021-06-01 VITALS — HEIGHT: 67 IN | BODY MASS INDEX: 32.96 KG/M2 | WEIGHT: 210 LBS

## 2021-06-01 VITALS — BODY MASS INDEX: 33.75 KG/M2 | HEIGHT: 66 IN | WEIGHT: 210 LBS

## 2021-06-01 VITALS — BODY MASS INDEX: 32.75 KG/M2 | WEIGHT: 202.9 LBS

## 2021-06-01 VITALS — BODY MASS INDEX: 32.49 KG/M2 | WEIGHT: 207.44 LBS

## 2021-06-01 VITALS — BODY MASS INDEX: 34.09 KG/M2 | HEIGHT: 66 IN | WEIGHT: 212.1 LBS

## 2021-06-01 VITALS — WEIGHT: 204.6 LBS | BODY MASS INDEX: 32.04 KG/M2

## 2021-06-02 VITALS — BODY MASS INDEX: 30.71 KG/M2 | WEIGHT: 214 LBS

## 2021-06-02 VITALS — HEIGHT: 67 IN | BODY MASS INDEX: 31.11 KG/M2 | WEIGHT: 198.2 LBS

## 2021-06-02 VITALS — HEIGHT: 67 IN | BODY MASS INDEX: 32.18 KG/M2 | WEIGHT: 205 LBS

## 2021-06-02 VITALS — BODY MASS INDEX: 31.64 KG/M2 | HEIGHT: 67 IN | WEIGHT: 201.6 LBS

## 2021-06-02 VITALS — BODY MASS INDEX: 31.23 KG/M2 | WEIGHT: 199 LBS | HEIGHT: 67 IN

## 2021-06-02 VITALS — WEIGHT: 199 LBS | BODY MASS INDEX: 31.17 KG/M2

## 2021-06-17 NOTE — PROGRESS NOTES
ASSESSMENT/ PLAN    1. Depression  Has been on Zoloft 50 mg daily. Not improving per patient. Will increase to 100 mg daily. Follow up in 2 weeks.   - sertraline (ZOLOFT) 100 MG tablet; Take 1 tablet (100 mg total) by mouth daily.  Dispense: 90 tablet; Refill: 0  - Ambulatory referral to Care Management (Primary Care)    2. Controlled type 2 diabetes mellitus with complication, without long-term current use of insulin  A1C 6.1. Will stop glipizide. Continue metformin 500 mg BID  - Ambulatory referral to Care Management (Primary Care)    3. Prostate cancer  A1C 16 range, was 12 in 2014. Patient doesn't want to go see urology anymore which is reasonable. Was told by urology it's slow growing and that he would die of something else before dying from prostate cancer.   - Ambulatory referral to Care Management (Primary Care)    4. GERD (gastroesophageal reflux disease)  I found a bottle of omeprazole in his pill bag and he tells me he only takes this once in a while. Advised patient to not take omeprazole but try TUMS first.     5. Chronic pain  Has been taking Tylenol thousand milligrams 3 times daily without any improvement in his pain.  This has really affected patient's mood.  Used to be on tramadol 50 mg twice daily as needed by his previous physician.  I will give him Percocet and instructed patient to take it 1 tablet daily.  He will need to go see spine care as well for further evaluation and possible cortisone injection into the lumbar spine to help with his pain.  - oxyCODONE-acetaminophen (PERCOCET) 5-325 mg per tablet; Take 1 tablet by mouth daily.  Dispense: 30 tablet; Refill: 0  - Ambulatory referral to Spine Care  - Ambulatory referral to Care Management (Primary Care)    6. Low back pain  See above. Long standing history of back pain.  Patient does state that he has received 1 cortisone injection into the spine many years ago and he thought it was very helpful however he has not had it since.  He would  like to try a cortisone injection.  Referral order for spine care placed.  - Ambulatory referral to Spine Care  - Ambulatory referral to Care Management (Primary Care)    7. Arteriosclerotic heart disease  8. Hyperlipidemia LDL goal <100  Was last seen by his cardiologist in March and was advised to continue with simvastatin 20 mg daily however he ran out of his medication I did not see this in his pill bag.  Prescription sent to pharmacy.  - simvastatin (ZOCOR) 40 MG tablet; Take 0.5 tablets (20 mg total) by mouth at bedtime.  Dispense: 60 tablet; Refill: 0  - Ambulatory referral to Care Management (Primary Care)    9. Has written advance directive indicating do not resuscitate status  Went over advanced directive with patient.  His healthcare agent is his son Diego.  Patient confirms DNR/DNI and comfort care status. I'm comfortable with his wishes.       This note was created using Dragon dictation software, spelling errors may occur.     Jennifer Tan MD        SUBJECTIVE   Derrick Silva is a 86 y.o. old male with a past medical history of  type 2 diabetes, aortic stenosis, arteriosclerotic disease, left bundle branch block, status post pacemaker placement, chronic stable angina, chronic osteoarthritis pain  who presented to clinic today for further follow up of multiple complaints, notably for chronic OA pain, medication management, advanced directive discussion, depression, prostate cancer follow up    1. Chronic pain: was previously on Tramadol but no longer on it.  Reports pain involving the low back is radiating down to his right leg for many years, knee pain, hip pain as well.  He has been taking Tylenol thousand milligrams 3 times daily since the last week however pain is not improving.  His [facility] physician at [facility] used to give him tramadol 50 mg twice daily but he has run out of this medication and does not remember when the last time he took it.  Because of the pain  has really limited him from doing what he is really enjoying doing.    2. Advance directive: Brought in POLST form today, indicating DNR/DNI and comfort care status.    3. Medication reconciliation: Brought in all of his medications bottle.    4. Depression: His wife and daughter Alissa who is here today think that he is very depressed as he is giving up his independence.  He told his car last week and is no longer driving.    5. Prostate cancer: Tells me today that he was told by his urologist a number of years ago not to worry about his prostate cancer is very slow growing and that he would die of something else before he does have prostate cancer.  He does not want to go back to see a urologist.  I reviewed PSA result with him today and is increasing compared to last time it was checked.  Both he and his wife and his daughter Alissa agree that it is not necessary for him to go back to see urologist.      Review of Systems:  Negative except as noted in HPI    The following portions of the patient's history were reviewed and updated as appropriate: past medical history, past surgical history, family history, allergies, current medications and problem list.    Medical History  Active Ambulatory (Non-Hospital) Problems    Diagnosis     Chronic stable angina     Depression     Memory difficulty     Type 2 diabetes mellitus     Eczema     Constipation     Diabetes mellitus type 2, controlled, with complications     Obesity     Aortic stenosis     Hyperlipidemia LDL goal <100     Arteriosclerotic heart disease     Benign essential hypertension     BPH (benign prostatic hyperplasia)     GERD (gastroesophageal reflux disease)     Onychia of toe     Osteoarthritis     Prostate cancer     History of adenocarcinoma of prostate     Encounter for long-term (current) use of high-risk medication     Onychomycosis     Past Medical History:   Diagnosis Date     Aortic stenosis      Arthritis      Cancer      Depression      Diabetes  mellitus      Diabetes mellitus, type II      GERD (gastroesophageal reflux disease)      Hyperlipidemia      Hypertension      Pacemaker        Surgical History  He  has a past surgical history that includes Carpal tunnel release (2014) and Cardiac pacemaker placement.    Social History  Reviewed, and he  reports that he has never smoked. He has never used smokeless tobacco.    Family History  Reviewed, and family history includes No Medical Problems in his father and mother; Parkinsonism in his daughter.    Medications  Reviewed and reconciled    Allergies  No Known Allergies      OBJECTIVE  Physical Exam:  Vital signs: /70 (Patient Site: Left Arm, Patient Position: Sitting, Cuff Size: Adult Large)  Pulse 84  Temp 98.4  F (36.9  C) (Oral)   Resp 22  Wt 204 lb 9.6 oz (92.8 kg)  BMI 32.04 kg/m2  Weight: 204 lb 9.6 oz (92.8 kg)    General appearance: pleasant, appears stated age, cooperative and in no distress  Eyes: EOMs intact, PERRL, conjunctivae normal.   ENT: moist oral mucosa, posterior oropharynx normal.   Lymph: no cervical/supraclavicular adenopathy  Respiratory: clear to auscultation bilaterally, good air movement throughout, no wheezing or crackles, speaking full sentences without difficulty  Cardiovascular: regular rate and rhythm, no murmur appreciated, no leg edema  Abdomen: active bowel sounds, soft, non-tender, non-distended, obese  Musculoskeletal: warm and well perfused, Low  to palpation everywhere  Skin: eczematous rash on abdomen  Neuro: alert oriented x 3, grossly normal otherwise, straight leg raise positive on right leg.   Psych: normal affect, appropriate conversation

## 2021-06-17 NOTE — PROGRESS NOTES
Assessment and Plan:   1. Encounter for Medicare annual wellness exam  Patient new to me. Here for establish care and also for annual wellness exam. POLST has been filled out 2017 prior to moving into assisted living. Patient will bring this back so I can take a look in a week follow up visit  Mini-Cog score of 2. See below for discussion.  Last colonoscopy 2008 - will need to get record for this. He also underwent EGD at the same time and will need to get record for this as well.     2. Type 2 diabetes mellitus  Last visit with previous primary care clinic for diabetes check was 8/2017 with A1C of 6.3 at that time. He's due for another lab check. Will get lab drawn when he comes back in a week for a follow up visit. He's on glipizide 5 mg QD and metformin 500mg BID for many years.   - glipiZIDE (GLUCOTROL XL) 5 MG 24 hr tablet;   - metFORMIN (GLUCOPHAGE) 500 MG tablet;   - Glycosylated Hemoglobin A1c; Future  - Basic Metabolic Panel; Future    3. Eczema  His rash is consistent with eczema. Will give him steroid cream in addition to copious moisturizer. Rash doesn't appear to be fungal.   - triamcinolone (KENALOG) 0.1 % cream; Apply topically 3 (three) times a day for 21 days.  Dispense: 85.2 g; Refill: 2    4. Constipation  Patient is apparently taking a pill daily for this. Will need to get his med list at next visit. In the meantime will add on Miralax daily as needed. Follow up in a week  - polyethylene glycol (GLYCOLAX) 17 gram/dose powder; Take 17 g by mouth daily as needed.  Dispense: 119 g; Refill: 0    5. Memory difficulty  Mini-Cog is 2 today. He's still driving. His wife cannot drive due to her chronic back/hip pain. He is to follow up in a week and I will discuss this further. Most likely I will send him to neurology or geriatrics for further evaluation. Per careeverywhere, this has not been noted by previous clinic. Also at this cardiologist's visit a year ago after he moved into assisted living,  patient indicated that he was very stressed as he and his wife moved into assisted living because of his wife's medical need and they don't feel that they are getting the help they need. Will need to explore this further at follow up visit.     6. Encounter for screening for lipoid disorders  He's on simvastatin 20 mg/d  - Lipid Cascade, FASTING; Future    7. Benign essential hypertension  /64. Will need lab work next visit.   - amLODIPine (NORVASC) 10 MG tablet;   - metoprolol succinate (TOPROL-XL) 50 MG 24 hr tablet;   - tamsulosin (FLOMAX) 0.4 mg Cp24;   - Basic Metabolic Panel; Future    8. Prostate cancer  Per [facility], was diagnosed in 2013 (PSA 12.75) and underwent staging. Last visit with urology Dr. Kaur 2015 and was told to come back in a year but he never did. Will need to discuss this next follow up visit in a week.    9. Aortic valve stenosis, etiology of cardiac valve disease unspecified  10. Arteriosclerotic heart disease  11. Chronic stable angina  [facility] has a history of coronary artery disease and mild aortic valve stenosis. At [facility], he underwent a cardiac catheterization in the past showing diffuse 3-vessel coronary disease not amenable to PCI - significant multivessel coronary disease with several severe stenoses in the distal arteries. He was noted to have a 90% calcified stenosis in the mid to distal LAD along with a 50% stenosis in the proximal LAD, moderate stenosis in the proximal circumflex and CTOs of both the first obtuse marginal and mid RCA. Medical management was recommended at that time. He also has a history of high-grade AV block and is status post pacemaker implant in 2013. 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 He has been followed by cardiologist Dr. Gann at [facility]  since 2014. The patient underwent a transthoracic echocardiogram on 02/26/2018, showing an ejection fraction of 55%. There was apical hypokinesis. There was of mild LVH and mild aortic stenosis. Patient has stable angina for years, exacerbated with vigorous walking. Cardiologist recommended medical management for him. During most recent cardiologist visit, there was a discussion for potentially increasing his antianginals, however, given his advanced age, frail status and low blood pressure, they opted not to increase his antianginal medications at this time. If he starts noticing chest pain at rest, he will need to call their clinic and they may proceed with cardiac catheterization to further look for proximal coronary disease which would be amenable to PCI. In the interim, he is to remain on aspirin, statin and beta blocker unchanged.   He will continue to see this cardiologist.  Mild aortic stenosis is a new finding but he is apparently asymptomatic regarding his aortic stenosis per cardiology's observation. He will follow up in a year (3/2019) with a repeat transthoracic echocardiogram. If the patient ever does develop severe aortic stenosis, he would be a candidate for TAVR only per cardiologist's note.    12. Encounter for long term use of high risk medication  Has been getting Tramadol on a regular basis from previous clinic for chronic OA pain.    13. Depression  Was on Sertraline but no longer taking. Will need to revisit this next follow up visit in a week    The patient's current medical problems were reviewed.    I have had an Advance Directives discussion with the patient.  The following health maintenance schedule was reviewed with the patient and provided in printed form in the after visit summary:   Health Maintenance   Topic Date Due     ADVANCE DIRECTIVES DISCUSSED WITH PATIENT  03/07/1950     ZOSTER VACCINE  03/07/1992     FALL RISK ASSESSMENT  03/07/1997     TD 18+ HE  01/15/2024     PNEUMOCOCCAL  POLYSACCHARIDE VACCINE AGE 65 AND OVER  Completed     INFLUENZA VACCINE RULE BASED  Completed     PNEUMOCOCCAL CONJUGATE VACCINE FOR ADULTS (PCV13 OR PREVNAR)  Completed        Subjective:   Chief Complaint: Derrick Silva is an 86 y.o. male here for an Annual Wellness visit.     HPI: Patient has a complex past medical history including type 2 DM, obesity, HLD, prostate cancer, CAD s/p pacemaker and has been receiving primary care through [facility] however recently moved into assisted living with his wife here in [place], hence the reason for him to establish care at our clinic today. His concern today is several rashes on his left leg and left neck as well as constipation. He didn't bring his med list with him today and he doesn't know what medications he's taking. Also his wife Muna is also here establish care for herself with me and Derrick drove both of them here. Derrick doesn't have any other concern except for rash and constipation as mentioned above. He reports itchy red rash on his left LE and left neck for couple weeks. His wife reports that he has dry skin and has tried to use moisturizing lotion more consistently. His wife doesn't have similar rash. Also he's constipated most days of the weeks and once in a while he would have diarrhea. He said he's taking a pill for his constipation but doesn't remember name. Derrick failed his Mini-Cog today, cannot draw clock and could only recall 1 out of 3 words, he hasn't been evaluated by a neurologist before. He has a 5th grade education per his wife. He used to work in construction. He was recently seen by his cardiologist within the [facility] system last month. He has a pacemaker and recently had it checked and it's running just fine.     Review of Systems:  Please see above.  The rest of the review of systems are negative for all systems.    Patient Care Team:  Jennifer Tan MD as PCP - General (Family Medicine)     There is no  problem list on file for this patient.    Past Medical History:   Diagnosis Date     Arthritis      Cancer      Depression      Diabetes mellitus      GERD (gastroesophageal reflux disease)      Hypertension       Past Surgical History:   Procedure Laterality Date     CARPAL TUNNEL RELEASE  2014      Family History   Problem Relation Age of Onset     No Medical Problems Mother      No Medical Problems Father       Social History     Social History     Marital status:      Spouse name: N/A     Number of children: 7     Years of education: 8     Occupational History     Not on file.     Social History Main Topics     Smoking status: Never Smoker     Smokeless tobacco: Never Used     Alcohol use Not on file     Drug use: Not on file     Sexual activity: Not on file     Other Topics Concern     Not on file     Social History Narrative      Current Outpatient Prescriptions   Medication Sig Dispense Refill     acetaminophen (TYLENOL) 325 MG tablet Take 325-650 mg by mouth.       amLODIPine (NORVASC) 10 MG tablet        aspirin-calcium carbonate 81 mg-300 mg calcium(777 mg) Tab Take 81 mg by mouth.       glipiZIDE (GLUCOTROL XL) 5 MG 24 hr tablet        losartan (COZAAR) 50 MG tablet Take 50 mg by mouth.       menthol 2.5 % Gel Apply topically.       metFORMIN (GLUCOPHAGE) 500 MG tablet        metoprolol succinate (TOPROL-XL) 50 MG 24 hr tablet        nitroglycerin (NITROSTAT) 0.4 MG SL tablet Place 0.4 mg under the tongue.       sertraline (ZOLOFT) 50 MG tablet        simvastatin (ZOCOR) 40 MG tablet Take 20 mg by mouth.       tamsulosin (FLOMAX) 0.4 mg Cp24        traMADol (ULTRAM) 50 mg tablet Take 50 mg by mouth.       docusate sodium (COLACE) 100 MG capsule Take 100 mg by mouth.       famotidine (PEPCID) 20 MG tablet Take 20 mg by mouth.       polyethylene glycol (GLYCOLAX) 17 gram/dose powder Take 17 g by mouth daily as needed. 119 g 0     triamcinolone (KENALOG) 0.1 % cream Apply topically 3 (three) times a  "day for 21 days. 85.2 g 2     No current facility-administered medications for this visit.       Objective:   Vital Signs:   Visit Vitals     /64     Pulse 76     Temp 98.7  F (37.1  C) (Oral)     Resp 26     Ht 5' 6\" (1.676 m)     Wt 212 lb 1.6 oz (96.2 kg)     BMI 34.23 kg/m2        VisionScreening:  No exam data present     PHYSICAL EXAM  /64  Pulse 76  Temp 98.7  F (37.1  C) (Oral)   Resp 26  Ht 5' 6\" (1.676 m)  Wt 212 lb 1.6 oz (96.2 kg)  BMI 34.23 kg/m2    General Appearance:    Alert, cooperative, no distress, appears stated age   Head:    Normocephalic, without obvious abnormality, atraumatic   Eyes:    PERRL, conjunctiva/corneas clear, EOM's intact   Ears:    Normal TM's and external ear canals, both ears   Nose:   Nares normal, septum midline, mucosa normal, no drainage   Throat:   Lips, mucosa, and tongue normal; teeth and gums normal   Neck:   Supple, symmetrical, trachea midline, no adenopathy;        thyroid:  No enlargement/tenderness/nodules.    Back:    no CVA tenderness   Lungs:     Clear to auscultation bilaterally, respirations unlabored   Chest wall:    No tenderness or deformity, pacemaker in left anterior chest.    Heart:    Regular rate and rhythm, S1 and S2 normal, no murmur, rub    or gallop   Abdomen:     Soft, non-tender, bowel sounds active all four quadrants,     no masses, no organomegaly           Extremities:   Extremities normal, atraumatic, no cyanosis, trace edema both legs.   Pulses:   2+ and symmetric all extremities   Skin:   Skin color, texture, turgor normal, very dry skin on bilateral LEs, faint erythematous excoriated lesions on anterior left shin and also posterior left neck, doesn't appear to be infectious, more like eczematous and pruritic appearing.    Lymph nodes:   Cervical and supraclavicular nodes normal   Neurologic:   CNII-XII intact. Normal strength, sensation and reflexes       Throughout. He doesn't know date. He does know state and city. He " doesn't know name of [place] president, he can't tell me months backward. He can't draw clock. He cannot recall 3 words immediately. Has a 5th grade education.        Assessment Results 4/16/2018   Activities of Daily Living No help needed   Instrumental Activities of Daily Living No help needed   Get Up and Go Score 12 seconds or more   Mini Cog Total Score 2   Some recent data might be hidden     A Mini-Cog score of 0-2 suggests the possibility of dementia, score of 3-5 suggests no dementia    Identified Health Risks:     Patient's advanced directive was discussed and patient already has an advanced directive/living will and will be brining it to clinic his next visit with me in a week.   Mini Cog score is 2. He will be coming back for follow up visit with me in a week and I will discuss more his memory issue and referral to see geriatrics for assessment with regards to possible dementia.

## 2021-06-17 NOTE — PROGRESS NOTES
RN Recommendations and Referrals  Diabetes feet exam next Diabetes follow up appointment.   Care guide check with daughter Roseann about  Annual Eye exam, if have not had one.     Action Plan    RN Will  Be available to the patient as nursing needs arise    Care Guide Will  Within 2 weeks from the RN assessment 05/0/18. Review goal and outreach to patient and create or add to action plan.     Goals  Goals        Patient Stated      CCC-Patient Stated (pt-stated)            Action steps to achieve this goal  1.  I will talk with care guide at Outreach calls.   2.  My care guide can contact my daughter Roseann to inform her of my upcoming appointments or mail my calendar appointment.   3.  My daughter Roseann can help with reminding me of my appointments.   4. My family can call the clinic  to get my appointment calendar mailed out.     Date goal set:  5/10/18          Diabetic population of intervention patient  A1C: 6.1 4/20/18  Last Diabetic Eye Exam: NA  Last Diabetic Foot Exam: NA    1. What have blood sugars been running?NA  2. How often does the patient check blood sugars?NA  3. Has the patient felt symptomatic with low readings?  NA  4. What does the patient do to feel better or raise blood sugars?NA    Clinic Care Coordination RN Assessed Needs  Met with patient, her  and their daughter Roseann Silva present for RN assessment. Patient lives with wife in a two bedroom and two bathroom apartment at the [facility]. Patient has [facility] insurance and Roseann will be contacting the [facility] and PCP to have patient start receiving an [facility] Level Service in addition to the main/ core service of 3 meals/ daily & weekly housekeeping. Their main concern is for Roseann to know of their appointments and having appointment reminders. Provided a copy of appointment calendar.     Patient Centered Assessment Method-PCAM TOTAL SCORE: 19 (5/10/2018  3:13 PM)  Level 1:  A score of 12-24 indicates that the  patient has very little to no initial need for RN or SW intervention.  Standard care guide outreach/support should be appropriate at the discretion of the .  The care guide can reach out to the RN/SW as needed for support or with new concerns.    PCAM (Patient Centered Assessment Method)   HEALTH AND WELL-BEING  Physical Health Concerns: History of Heart Attack, Diabetes  Other Physical Health Concerns:: Geeralized weakness, constipation, chronic pain  RN Assessment: Physical Health Needs: Mod to severe symptoms or problems that impact on daily life  RN Assessment: Physical Health Problems: Mod to severe impact upon mental well-being and preventing enjoyment of usual activities  Mental Health Concerns: Depression  RN Assessment:Other Mental Well-Being Concern: No identified areas of concern  Lifestyle/Habit Concerns: Denies concerns that require further investigation  Other Lifestyle/Habit Concerns:: [facility] offers daily social activites for socialization and outing events.   RN Assessment: Lifestyle Behaviors: No identified areas of concern  SOCIAL ENVIRONMENT  Home Environment Concerns: Denies concerns that require further investigation  Other Home Environment Concerns:: Lives at [facility] in a two bedroom / two bathroom with emergency pull cord. Has LTC insurance and daughter Roseann will assist with coordinating a Level of services for him. She will discuss with PCP and [facility].   RN Assessment: Home Environment: Consistently safe, supportive, stable, no identified problems  Daily Activities Concerns: Denies concerns that require further investigation  Other Daily Activities Concerns:: Uses [id number] transportation service.   RN Assessment: Daily Activites: No identified problems or perceived positive benefits  Social Network Concerns: Denies concerns that require further investigation  RN Assessment: Social Network: Adequate participation with social networks  Financial Status and Service Concerns:  Denies concerns that require further investigation  Other Financial Status and Service Concerns:: Has Long Term Care insurance.   RN Assessment: Financial Resources: Financially secure, resources adequate, no identified problems  HEALTH LITERACY AND COMMUNICATION  Understanding of Health and Wellbeing Concerns: Reasonable to good understanding but does not feel able to engage with advise at this time  Other Undertanding of Health and Wellbeing Concerns:: Patient has support of family and [facility] services to help him with understanding plan of care.   RN Assessment: Health Literacy: Reasonable to good understanding but do not feel able to engage with advice at this time  Engagement Concerns: Some difficulties in communication with or without moderate barriers  Other Engagement Concerns:: Has support of famiyl and [facility] services to support him with plan of care.   RN Assessment: Engagement: Adequate communication, with or without minor barriers  Barriers to Compliance with Medical Recommendations: Denies concerns that require further investigation  SERVICE COORDINATION  Other Services: Other care/services not required at this time  Coordination of Services: All required care/services in place and well coordinated  PCAM TOTAL SCORE: 19    Emergency Plan  Depression  Everyone feels down at times. The blues are a natural part of life. But an unhappy period that s intense or lasts for more than a couple of weeks can be a sign of depression. Depression is a serious illness. It can disrupt the lives of family and friends. If you know someone you think may be depressed, find out what you can do to help.    Know the serious signals  Warning signals for suicide include:    Threats or talk of suicide    Statements such as  I won t be a problem much longer  or  Nothing matters     Giving away possessions or making a will or  arrangements    Buying a gun or other weapon    Sudden, unexplained cheerfulness or calm after a period  of depression  If you notice any of these signs, get help right away. Call a health care professional, mental health clinic, or suicide hotline and ask what action to take. In an emergency, don t hesitate to call the police.    [facility] Mental Health Crisis Lines:  [facility] [phone]  [facility] [phone]  [facility] [phone]  [facility] [phone]  [facility], Adults [phone]  [facility], Children [phone]  [facility] Adults [phone]  [facility], Children [phone]    Diabetes: Sick-Day Plan  Infections, the flu, and even a cold, can cause your blood sugar to rise. And, eating less, nausea, and vomiting may cause your blood glucose to fall (hypoglycemia). Ask your health care provider to help you develop a sick-day plan. The following information can help.    Call your health care provider if:    You vomit or have diarrhea for more than 6 hours.    Your blood glucose level is higher than usual or over 250 mg/dL after you have taken extra insulin (if recommended in your sick-day plan).    You take oral medicine for diabetes, and your blood sugar is higher than usual or over 250 mg/dL, before a meal and stays that high for more than 24 hours.    Your blood glucose is lower than usual or less than 70 mg/dL    You have moderate to large amounts of ketones in your blood or urine.    You aren t better after 2 days.

## 2021-06-17 NOTE — PROGRESS NOTES
Called patient to schedule RN Assessment. Scheduled for 5/10 at 2:00pm. Called patient's facility at [facility] to set up transportation.

## 2021-06-17 NOTE — PROGRESS NOTES
ASSESSMENT/ PLAN    1. Generalized weakness  2. Chronic pain  ER follow up. Weakness persisted. Chronic pain persisted. Son Omar tells me that pain has been there for many years. Patient manages his own medications and doesn't know what he takes. One of the daughters who also has Parkinson's comes over on the weekend to sort his pills for him. It seems that he's taking Tramadol for some time (50 mg BID per [facility]where). Discussed with son to add Tylenol 1000 mg TID and only take Tramadol when pain is severe. Advised son to see if he can get more help at assisted living for him and just not for his wife only. Patient to bring back all pill bottles/pill boxes to go over next visit. F/u in a week.   - Basic Metabolic Panel  - acetaminophen (TYLENOL EXTRA STRENGTH) 500 MG tablet; Take 2 tablets (1,000 mg total) by mouth 3 (three) times a day.  Dispense: 100 tablet; Refill: 2    3. Prostate cancer  Recheck PSA today. Has not received any intervention for this per [facility]where. Discussed urology referral for further discussion/follow up. With his age, may not pursue anything.   - PSA, Annual Screen (Prostatic-Specific Antigen)    4. Diabetes mellitus type 2, controlled, with complications  Will check A1C and depends on result, consider discontinuing glipizide.   - Glycosylated Hemoglobin A1c  - Basic Metabolic Panel    5. Benign essential hypertension  Well controlled. Check lab.   - Basic Metabolic Panel    7. Memory deficit  Failed Mini-Cog during annual wellness exam. Son Omar reports his short term memory has been getting worse. Son doesn't think patient should continue driving. Will have him go see specialist for further testing. He does only have a 5th grade education and has a hard time read or write.   - Ambulatory referral to Geriatrics    8. Aortic stenosis  9. Arteriosclerotic heart disease  10. Chronic stable angina  Would like to establish care with a cardiologist within [facility].   - Ambulatory  referral to Cardiology      This note was created using Dragon dictation software, spelling errors may occur.     Jennifer Tan MD        SUBJECTIVE   Derrick Silva is a 86 y.o. old male with a past medical history of type 2 diabetes, aortic stenosis, arteriosclerotic disease, left bundle branch block, status post pacemaker placement, chronic stable angina, chronic osteoarthritis pain who presented to clinic today for further evaluation of ER follow up and complaint of weakness and fatigue.  He was evaluated at [facility] yesterday 4/19/2018 for generalized fatigue, muscle ache and neck pain for the last month.  So had a mechanical fall when he lost his balance and hit his head on the wall recently and he was not evaluated for this fall.  In the ER, comprehensive lab work was performed including CK BNP troponin and lactic acid CMP influenza and mono were performed and are negative, EKG shows sinus rhythm with first-degree AV block and left bundle branch block, CT cervical spine, CT head, and chest x-ray also are negative.  Today patient is here for follow-up.  Son Omar is here with him.  He reports that he continues to have pain involving his legs hips back and neck and it has been getting worse.  He also has weakness in his extremities as well.  He was previously on tramadol for his osteoarthritis pain given by his previous clinic. His right leg pain is his main concern. His whole body pain has been there for quite some times, off and on. He was a  and also moved furniture when he was younger. He is not sure if he's taking Tramadol. Also he's very distressed about his wife's needs as she's deaf so he has to answer the phone and also call people for her, and his wife would like for him to continue driving. He feels overwhelmed about their finances and the cost of assisted living.       Review of Systems:  Negative except as noted in HPI    The following portions of the patient's history were  reviewed and updated as appropriate: past medical history, past surgical history, family history, allergies, current medications and problem list.    Medical History  Active Ambulatory (Non-Hospital) Problems    Diagnosis     Chronic stable angina     Depression     Memory difficulty     Type 2 diabetes mellitus     Eczema     Constipation     Diabetes mellitus type 2, controlled, with complications     Obesity     Aortic stenosis     Hyperlipidemia LDL goal <100     Arteriosclerotic heart disease     Benign essential hypertension     BPH (benign prostatic hyperplasia)     GERD (gastroesophageal reflux disease)     Onychia of toe     Osteoarthritis     Prostate cancer     History of adenocarcinoma of prostate     Encounter for long-term (current) use of high-risk medication     Onychomycosis     Past Medical History:   Diagnosis Date     Aortic stenosis      Arthritis      Cancer      Depression      Diabetes mellitus      Diabetes mellitus, type II      GERD (gastroesophageal reflux disease)      Hyperlipidemia      Hypertension      Pacemaker        Surgical History  He  has a past surgical history that includes Carpal tunnel release (2014) and Cardiac pacemaker placement.    Social History  Reviewed, and he  reports that he has never smoked. He has never used smokeless tobacco.    Family History  Reviewed, and family history includes No Medical Problems in his father and mother; Parkinsonism in his daughter.    Medications  Reviewed and reconciled    Allergies  No Known Allergies      OBJECTIVE  Physical Exam:  Vital signs: /78 (Patient Site: Right Arm, Patient Position: Sitting, Cuff Size: Adult Regular)  Pulse 94  Temp 97.2  F (36.2  C) (Oral)   Resp 18  Wt 207 lb 7 oz (94.1 kg)  BMI 32.49 kg/m2  Weight: 207 lb 7 oz (94.1 kg)    General appearance: pleasant, appears stated age, cooperative and in no distress, obese  Eyes: EOMs intact, PERRL, conjunctivae normal.   ENT: moist oral mucosa, posterior  oropharynx normal,   Lymph: no cervical/supraclavicular adenopathy  Respiratory: clear to auscultation bilaterally, good air movement throughout, no wheezing or crackles, speaking full sentences without difficulty  Cardiovascular: regular rate and rhythm, no murmur appreciated, no leg edema  Abdomen: active bowel sounds, soft, non-tender, non-distended  Musculoskeletal: tenderness on palpation of both knees and ankles.    Skin: no rashes  Neuro: alert oriented x 3, grossly normal otherwise  Psych: sad/flat affect, appropriate conversation

## 2021-06-18 NOTE — PROGRESS NOTES
ASSESSMENT:     Diagnoses and all orders for this visit:    Lumbalgia  -     CT Lumbar Spine Without Contrast; Future; Expected date: 5/23/18    Lumbar radiculitis  -     CT Lumbar Spine Without Contrast; Future; Expected date: 5/23/18    Myofascial pain  -     CT Lumbar Spine Without Contrast; Future; Expected date: 5/23/18    Sleep disturbance  -     CT Lumbar Spine Without Contrast; Future; Expected date: 5/23/18    BPH (benign prostatic hyperplasia)    Prostate cancer            Derrick Silva is a 86 y.o. male  with a BMI of Body mass index is 31.12 kg/(m^2). who presents today in consultation at the request of Jennifer Bah MD  for new patient evaluation of chronic low back pain with radicular pain to the right lower extremity.  Patient symptoms could be due to the disc herniation at the lower lumbar spine.   Patient with mild symptoms of urinary incontinence, balance difficulties.  Head and cervical CT scan done on April 2018 were negative for abnormalities. Patient with history of prostate cancer.  We will obtain a lumbar CT scan to rule out disc herniation, prostate cancer metastasis.  Patient with no bowel incontinence, saddle anesthesia, or unintentional weight loss.          SERGIO:  23  WHO-5:  0    PLAN:  A shared decision making model was used.  The patient's values and choices were respected.  The following represents what was discussed and decided upon by the physician and the patient.      1.  DIAGNOSTIC TESTS: I ordered lumbar CT scan to evaluate for possible disc herniation, prostate cancer metastasis, compression fracture, dislocation, subluxation.  2.  PHYSICAL THERAPY: We will consider physical therapy program after reviewing the lumbar CT scan.  3.  MEDICATIONS: Patient will continue on Percocet 5-3 25 mg as needed for pain prescribed by his primary care provider.  4.  INTERVENTIONS: No therapeutic steroid injections at this time.   5.  PATIENT EDUCATION: I thoroughly discussed the  plan with the patient today.  I thoroughly discussed with the patient and his son the importance of obtaining the lumbar CT scan imaging to rule out disc herniation, prostate cancer metastasis, compression fracture.  He verbalizes understanding and agrees with the plan.      FOLLOW-UP:   Patient will follow up with me in 1 weeks.  All questions were answered.      ALINE Luke, MPA-C          SUBJECTIVE:  Derrick Silva  Is a 86 y.o. male who presents today for new patient evaluation of low back pain.  Patient reports chronic low back pain for many years.  Today he reports bilateral lower back pain that he rates it at 7 out of 10 in intensity.  Patient stated that his low back pain radiates to the right gluteal, right posterior lateral thigh, and the proximal aspect of the right calf.  Patient states that his symptoms are aggravated by standing, walking, getting up from a sitting position, getting up from bed in the morning and rates it at 9 out of 10 intensity on its worse.  Patient was recently prescribed Percocet 5-325 mg by his primary care provider as needed for pain since Tylenol by itself is not helping with his pain.  Patient used to take tramadol 50 mg as needed for pain in the past.  Patient reports history of falling off a ladder 30 years ago.  Patient stated that since that time he has been having intermittent low back pain and radicular pain to the lower extremity.  Patient stated that he had steroid injection to the lower back in the past.  Patient has no recent imaging of the lower back.  Patient has a pacemaker.  Patient reports history of at least 2 falls in the last several years.  Patient had CT head, and the CT of the cervical spine back in April 2018 that were basically without significant pathology.  Patient denies recent falls.  Patient reports occasional urinary incontinence.  Patient states that sometimes when he is watching TV, he noticed a few drips in his underwear and sometimes,  a lot of urine in his underwear before he reaches the bathroom.  His bowel movement is fine.  Patient has a history of slow-growing prostate cancers for the last 8 years.  Patient was told by his urologist that is slow-growing and that he would die of something else before dying from prostate cancer.  Patient reports sometimes having balance difficulties.  Patient denies bowel incontinence, unintentional weight loss, saddle anesthesia, fever or chills.       Medications:    Current Outpatient Prescriptions   Medication Sig Dispense Refill     acetaminophen (TYLENOL EXTRA STRENGTH) 500 MG tablet Take 2 tablets (1,000 mg total) by mouth 3 (three) times a day. 100 tablet 2     amLODIPine (NORVASC) 10 MG tablet Take 10 mg by mouth daily.       aspirin-calcium carbonate 81 mg-300 mg calcium(777 mg) Tab Take 81 mg by mouth.       docusate sodium (COLACE) 100 MG capsule Take 100 mg by mouth.       famotidine (PEPCID) 20 MG tablet Take 20 mg by mouth.       losartan (COZAAR) 50 MG tablet Take 50 mg by mouth.       menthol 2.5 % Gel Apply topically.       metFORMIN (GLUCOPHAGE) 500 MG tablet 500 mg 2 (two) times a day with meals.       metoprolol succinate (TOPROL-XL) 50 MG 24 hr tablet Take 50 mg by mouth. 1 tablet in the AM, half tablet in the PM       nitroglycerin (NITROSTAT) 0.4 MG SL tablet Place 0.4 mg under the tongue.       oxyCODONE-acetaminophen (PERCOCET) 5-325 mg per tablet Take 1 tablet by mouth daily. 30 tablet 0     polyethylene glycol (GLYCOLAX) 17 gram/dose powder Take 17 g by mouth daily as needed. 119 g 0     sertraline (ZOLOFT) 100 MG tablet Take 1 tablet (100 mg total) by mouth daily. 90 tablet 0     simvastatin (ZOCOR) 40 MG tablet Take 0.5 tablets (20 mg total) by mouth at bedtime. 60 tablet 0     tamsulosin (FLOMAX) 0.4 mg Cp24 Take 0.4 mg by mouth daily.       No current facility-administered medications for this encounter.        Allergies: No Known Allergies    PMH:    Past Medical History:    Diagnosis Date     Aortic stenosis      Arthritis      Cancer      Depression      Diabetes mellitus      Diabetes mellitus, type II      GERD (gastroesophageal reflux disease)      Hyperlipidemia      Hypertension      Pacemaker        PSH:    Past Surgical History:   Procedure Laterality Date     CARDIAC PACEMAKER PLACEMENT       CARPAL TUNNEL RELEASE  2014       Family History:    Family History   Problem Relation Age of Onset     No Medical Problems Mother      No Medical Problems Father      Parkinsonism Daughter        Social History:   Social History     Social History     Marital status:      Spouse name: N/A     Number of children: 7     Years of education: 8     Occupational History     Not on file.     Social History Main Topics     Smoking status: Never Smoker     Smokeless tobacco: Never Used     Alcohol use Not on file     Drug use: Not on file     Sexual activity: Not on file     Other Topics Concern     Not on file     Social History Narrative       ROS: Bilateral low back pain with radicular pain to the right lower extremity.  Balance difficulties.  Urinary incontinence.  Specifically negative for bowel/bladder dysfunction, fevers,chills, appetite changes, unexplained weight loss.   Otherwise 13 systems reviewed are negative.  Please see the patient's intake questionnaire from today for details.      OBJECTIVE:  PHYSICAL EXAMINATION:    CONSTITUTIONAL:  Vital signs as above.  No acute distress.  The patient is well nourished and well groomed.  PSYCHIATRIC:  The patient is awake, alert, oriented to person, place, time and answering questions appropriately with clear speech.    SKIN:  Skin over the face, bilateral lower extremities, and posterior torso is clean, dry, intact without rashes.    GAIT:  Gait is antalgic, with balance difficulties.  Patient is using a walker.   The patient is able to heel and toe walk with some difficulties.   STANDING EXAMINATION: Patient has tenderness at the  L4-L5, L5-S1 bilaterally.  MUSCLE STRENGTH:  3+ /5 for the right great toe dorsal extension.  The patient has 5/5 strength for the bilateral hip flexors, knee flexors/extensors, ankle dorsiflexors/plantar flexors, left great toe extensors, bilateral ankle evertors/invertors.  NEUROLOGICAL:  2/4 patellar, medial hamstring, and achilles reflexes bilaterally.  Negativ Babinski's bilaterally.  No ankle clonus bilaterally. Sensation to light touch is decreased in the right L5 dermatome.  Sensation to light touch is intact in the bilateral L4, left L5, and bilateral S1 dermatomes.  VASCULAR:  2/4  pulses bilaterally.  Bilateral lower extremities are warm.  There is no pitting edema of the bilateral lower extremities..  LYMPH NODES:  No palpable or tender inguinal/popliteal lymph nodes.  MUSCULOSKELETAL: Positive right straight leg raise.  Mild pain with hip impingement bilaterally.  Negative Jone test bilaterally.  Negative left straight leg raise.      RESULTS: No imaging to review today.

## 2021-06-18 NOTE — PROGRESS NOTES
In clinic device check with Device RN followed by office visit with Dr. Ellis to establish care.  Please see link for full device report.  Patient was informed of results and next follow up during today's visit.

## 2021-06-18 NOTE — PROGRESS NOTES
Assessment:   Derrick Silva is a 86 y.o. y.o. male with past medical history significant for aortic stenosis, coronary artery disease, hypertension, BPH, type 2 diabetes mellitus, GERD, history of prostate cancer, hyperlipidemia, depression, chronic pain who presents today for follow-up regarding chronic bilateral low back pain with radiation into the right greater than left lower extremity.  CT myelogram lumbar spine shows advanced multilevel lumbar spondylosis with severe spinal stenosis L3-4 and moderate to severe spinal stenosis at L1-L2 and L4-5.  Patient also has multilevel high-grade foraminal stenosis.  Patient does have limited walking and standing tolerance consistent with neurogenic claudication.  Patient has had some difficulty with bladder control.  He was evaluated by Dr. Olguin who felt that bladder control problems could be related to other issues.  She recommended an interlaminar epidural steroid injection.  -Patient also has complaints of right neck pain and bilateral upper extremity numbness and tingling.  Dr. Olguin indicated that she had concerns of cervical spinal stenosis in light of his bilateral upper extremity numbness and tingling and significant imbalance.  A CT myelogram cervical spine showed mild spinal stenosis at C3-4, but there was segmental instability on flexion-extension plain films, so the radiologist felt that central stenosis may be significant in flexion.  This also showed high-grade multilevel foraminal stenosis.       Plan:     A shared decision making plan was used.  The patient's values and choices were respected.  The following represents what was discussed and decided upon by the physician assistant and the patient.      1.  DIAGNOSTIC TESTS: I reviewed the CT myelogram lumbar spine, CT myelogram cervical spine, and cervical spine flexion extension x-rays.  No further diagnostic tests were ordered.    2.  PHYSICAL THERAPY: No physical therapy was ordered.    3.   MEDICATIONS: No changes are made to the patient's medications.  He uses gabapentin 100 mg at bedtime, Percocet 1 tab twice daily, and Tylenol 1000 mg 3 times daily.    4.  INTERVENTIONS: I agree with Dr. Olguin that trying a interlaminar epidural steroid injection is reasonable.  I recommended an L5-S1 intralaminar epidural steroid injection.  Patient indicated she would like to proceed.    5.  PATIENT EDUCATION: The patient and his daughter had questions about the plan for his cervical spine.  I recommended that the patient follow-up with Dr. Rodriguez to discuss this further.    6.  FOLLOW-UP: I have not scheduled any routine follow-up for the patient.  Patient can follow-up with Dr. Olguin to discuss results of intralaminar epidural steroid injection in the lumbar spine and to determine plan for cervical spine.  If he has any questions or concerns, he should not hesitate to call.    Subjective:     Derrick Silva is a 86 y.o. male who presents today for follow-up regarding chronic low back pain with radiation into the right greater than left lower extremity with associated limited walking and standing tolerance.  Patient was last seen by Evelia Mendoza PA-C on May 30, 2018.  He was then referred to see Dr. Olguin for lumbar spinal stenosis.  Patient was seen by Dr. Olguin on June 12, 2018.  Dr. Olguin recommended a intralaminar epidural steroid injection for the lumbar spinal stenosis.  She also ordered a CT myelogram cervical spine for further evaluation, as she was concerned for cervical spinal stenosis given symptoms including neck pain, bilateral upper extremity numbness and tingling, difficulty with balance.  Patient returns today to discuss the recommended injection.    Patient complains of bilateral lower back pain.  Pain radiates into bilateral lower extremities, right greater than left.  And extends into the buttocks and down the bilateral posterior thighs to the knees.  He has numbness and  tingling in both feet.  She also complains of right neck pain which is going on for several months.  Pain extends into the right shoulder.  He has numbness and tingling in both hands.  Patient rates his pain today as a 4 out of 10.  At its best it is a 2 out of 10.  At its worst it is.  Patient's low back and leg pain is aggravated with walking.  He can only walk about half a block before he has to stop and rest.  Patient has had 2 falls in the past month.  He states that he is very poor balance.  He does utilize a walker.  Patient reports that hot packs and medication help to alleviate the pain.  He feels weak.    Patient is using gabapentin 100 mg at bedtime.  Uses Percocet 1 tab twice daily.  He also uses Tylenol 1000 mg 3 times daily.    Past medical history is reviewed and is pertinent for beginning Percocet.  This was prescribed by his primary care provider.    Family history is reviewed and is unchanged in the interim.  Review of Systems:  Positive for numbness/tingling, loss of bladder control, weakness, dizziness, nausea/vomiting, blurry vision, balance changes.  Negative for foot drop, headache.     Objective:   CONSTITUTIONAL:  Vital signs as above.  No acute distress.  The patient is well nourished and well groomed.    PSYCHIATRIC:  The patient is awake, alert, oriented to person, place and time.  The patient is answering questions appropriately with clear speech.  Normal affect.  HEENT: Normocephalic, atraumatic.  Sclera clear.    SKIN:  Skin over the face, posterior torso, bilateral upper and lower extremities is clean, dry, intact without rashes.  MUSCULOSKELETAL:  Gait is unsteady and wide-based.  Patient ambulate with a 4 wheeled walker for assistance.  Patient has difficulty transitioning from a seated to a standing.  He has a rock back and forth in the chair to get momentum to get upright.   Mild tenderness over the right greater than left lower lumbar paraspinal muscles.  Patient is 4/5 strength  with bilateral finger flexion/abduction.  5/5 strength bilateral shoulder abductors, elbow flexors/extensors.  The patient has 5/5 strength for the bilateral hip flexors, knee flexors/extensors, ankle dorsiflexors/plantar flexors.  NEUROLOGICAL: 3+ patellar, 2+ Achilles reflexes which are symmetric bilaterally.  No ankle clonus bilaterally.  Subjective diminished sensation bilateral lower extremities involving the feet and lower legs.  Patient also reports diminished sensation in the hands bilaterally.     RESULTS:    CT myelogram lumbar spine dated June 7, 2018:  IMPRESSION:   CONCLUSION:  1.  Advanced multilevel lumbar spondylosis without significant change compared to the recent noncontrast CT exam.  2.  At L3-L4, severe trefoil type spinal canal stenosis and severe bilateral neural foraminal stenosis. Impingement upon traversing caudal nerve roots which are centrally clumped at this level. Severe bilateral neural foraminal stenosis. Correlate with   L3 radicular symptoms.  3.  At L4-L5, moderate to severe trefoil type spinal canal stenosis and severe bilateral neural foraminal stenosis. Impingement upon bilateral L4 nerve roots.  4.  At L5-S1, mild spinal canal stenosis and severe bilateral neural foraminal stenosis with impingement upon L5 nerve roots.  5.  At L1-L2, moderate to severe spinal canal stenosis with asymmetric narrowing of the right lateral recess. Mild to moderate right neural foraminal stenosis.  6.  At L2-L3, mild to moderate spinal canal stenosis and mild bilateral neural foraminal stenosis.    CT myelogram cervical spine from [facility] dated June 21, 2018:  IMPRESSION:   CONCLUSION:  1.  Mild C3-C4 right ventral spinal canal stenosis. This level demonstrates segmental instability on the comparison flexion and extension plain films. Canal stenosis may be significant in flexion.  2.  Moderate right C3-C4, severe left C4-C5, high-grade bilateral C5-C6 and severe right C6-C7 neural foraminal  stenoses again noted.    XR CERVICAL SPINE FLEXION EXTENSION 2 - 3 VWS  6/21/2018 10:45 AM     INDICATION: Spinal stability assessment.  COMPARISON: CT myelogram 06/21/2018     FINDINGS: No prevertebral edema. No vertebral body height loss. 2.5 mm C3-C4 anterior subluxation noted in the neutral position. It measures 3.5 mm in flexion and 1 mm in extension. It reduces on the comparison supine CT. Findings are consistent with   segmental instability. Mild C3-C4, mild to moderate C4-C5 and severe C5-C6 and C6-C7 interbody degeneration. Mild to moderate C5-C6 and C7-T1 facet arthropathy.

## 2021-06-18 NOTE — PROCEDURES
[facility] Radiology Post Procedure    Procedure: Lumbar puncture for CT myelogram    Radiologist: Bro Antonio    Contrast: 20 mL Omnipaque 180    Fluoro time: 1 minute    EBL: Minimal    Complications: None evident    Preliminary findings: (see dictation for full detail)  - L3-4 lumbar puncture, 22 G spinal needle.  - Intrathecal infusion of 20 cc Omnipaque 180.  - Good opacification of the cervical subarachnoid space.    Assess/Plan:   CT myelogram.  Report to ordering provider.  Bedrest for 2 hours.    Bro Antonio

## 2021-06-18 NOTE — PROGRESS NOTES
Derrick Silva is here today for a L5-S1 ILESI injection.  The patient is not able to have the injection today because a cervical myelogram was done yesterday.  The patient will be rescheduled on Tuesday 06/26/18 arrival time of 1120 (1140 injection per Dr Boo).      No charge for today s visit.

## 2021-06-18 NOTE — LETTER
Letter by Christi Amaya at      Author: Christi Amaya Service: -- Author Type: --    Filed:  Encounter Date: 1/29/2019 Status: (Other)       Derrick LUISA Shira  [address]  [facility] [id number]      January 29, 2019      Dear Reyna Silva,    RE: Remote Results    We are writing to you regarding your recent Remote Pacemaker check from home. Your transmission was received successfully. Battery status is satisfactory at this time.     Your results are within normal limits.    Your next device appointment will be a remote check on May 1, 2019; this will occur automatically.    To schedule or reschedule, please call [phone] and press 1.    NOTE: If you would like to do an extra transmission, please call [phone] and press 3 to speak to a nurse BEFORE transmitting. This ensures that the Device Clinic staff is aware of the reason you are sending a transmission, and can follow-up with you after it has been reviewed.    We will be checking your implanted device from home (remotely) every three months unless otherwise instructed. We will need to see you in the clinic at least once a year. You may need to be seen in the clinic sooner depending on the results of your check.    Please be aware:    The follow-up schedule is like a Physician prescription.    Your remote monitor is paired to your specific implanted device.      Sincerely,    [facility]

## 2021-06-18 NOTE — PROGRESS NOTES
Neurosurgery consultation was requested by: Tena Chavez CNP  Pain: LBP  Radicular Pain is present: Radiates into left leg and pain in right knee   Lhermitte sign: No  Motor complaints: Weakness in both legs and both shoulders   Sensory complaints: Numbness from left knee down to ankle  Gait and balance issues: Yes  Bowel or bladder issues: Has some issues, not sure if related  Duration of SX is: Back pain for many years, progressed in the last 6 months   The symptoms are worse with: Any position for too long   The symptoms are better with: Adjusting positions   Injury: Denies   Severity is:Chronic   Patient has tried the following conservative measures: Not recently   SERGIO score is:  KRYSTAL Douglas

## 2021-06-18 NOTE — PROGRESS NOTES
Attempt 1: Care Guide called patient.  If this patient is returning my call, please transfer to Linsey at ext [phone].

## 2021-06-18 NOTE — PROGRESS NOTES
Assessment / Plan:     Diagnoses and all orders for this visit:    Lumbalgia    Spinal stenosis of lumbar region with neurogenic claudication    Lumbar disc herniation    Lumbar radiculitis    Weakness of right foot    Urinary incontinence    Constipation          Derrick Silva is a 86 y.o. y.o. male with past medical history significant for aortic stenosis, benign essential hypertension, benign prostatic hyperplasia, diabetic mellitus type II, GERD, obesity, prostate cancer, constipation, depression, chronic pain, who presents today for follow-up regarding chronic low back pain with weakness of the lower extremity, and radicular pain to the right lower extremity in the L5 nerve distribution.  Lumbar CT scan shows severe trifocal type spinal canal stenosis at the L3-L4, L4-L5, and moderate spinal canal stenosis at the L2-L3 that is probably contributing to patient's symptoms of the neurogenic claudication.  Patient symptoms of the radicular pain to the right lower extremity in the right L5 distribution could be due to the severe neuroforaminal stenosis at the L5-S1.  Patient with alarming symptoms of urinary incontinence, constipation and weakness of the lower extremity that could be due to the severe spinal canal stenosis seen on the lumbar CT scan.    I discussed this case with Dr. Boo.  I contacted the son of the patient Josue via telephone today.  I informed the patient that I discussed his father case with Dr. Boo, and the recommendation to see neurosurgeon urgently.  I placed a neurosurgery referral for patient to follow-up with [facility] neurosurgery.  I discussed with the patients son, that if he notices worsening of his father's symptoms,  that he need to seek immediate medical attention at the emergency room.  He verbalizes understanding.          A shared decision making plan was used.  The patient's values and choices were respected.  The following represents what was discussed and decided  upon by the physician and the patient.      1.  DIAGNOSTIC TESTS: Lumbar CT scan shows severe trefoil type spinal canal stenosis at the L3-L4 with severe right and severe left neural foraminal stenosis.  Also at the L4-L5 there is severe that she felt tight spinal canal stenosis with moderate severe right and severe left foraminal stenosis.  At the L5-S1 there is severe bilateral neural foraminal stenosis.  At the L2-L3 there is moderate spinal canal stenosis with mild bilateral neuroforaminal stenosis.  I reviewed the lumbar MRI imaging and the report with the patient today.  The patient verbalizes understanding.    2.  PHYSICAL THERAPY: Physical therapy was not ordered today.  Patient will follow up with neurosurgery for evaluation of his symptoms.  3.  MEDICATIONS: Patient will continue on Percocet 5-325 mg as needed for pain prescribed by his primary care provider.  4.  INTERVENTIONS: We will consider therapeutics steroid injection, after patient evaluation with neurosurgery.  5.  PATIENT EDUCATION: I thoroughly discussed the plan with the patient today.  I discussed with the patient the importance of discussing his case with the neurosurgeon due to the recent finding of the lumbar spinal  cannal stenosis seen on the lumbar CT scan.  He Verbalizes understanding and agrees with the plan.  6.  FOLLOW-UP: Patient will follow up with me in 3  weeks.  All questions were answered.            Subjective:     Derrick Silva is a 86 y.o. male who presents today for follow-up regarding chronic low back pain for years.  Patient noticed radicular pain to the right gluteal, right posterior lateral thigh, right proximal aspect of the right calf, and today he reports right dorsal foot pain.  Patient states that his symptoms are aggravated by standing for too long, walking, getting up from a sitting position, getting up from his bed in the morning and rates that pain at 9 out of 10 intensity on its worse.  Patient has been  taking Percocet 5-3 25 mg prescribed by his primary care provider as needed for pain since Tylenol is not helping with his symptoms.  Patient tried taking tramadol in the past without significant pain relief.  Patient reports history of falls in the past.  His symptoms today he reports that he fell at the nursing home backward and lost his balance.  Again another incident when he was getting out of the car he fell backwards.  Patient today reports that he has been constipated for 7 days without a bowel movement.  Patient stated that in the past he will have a bowel movement every 2-3 days.  He admits for taking Percocet only as needed for pain.  Patient has a pacemaker therefore we ordered a CT scan of the lumbar spine.  His CT head and CT of the cervical spine that were done back in April 2018 where negative for any abnormalities.  Patient also reports occasional urinary incontinence, where he will notice that he with his underwear.  Patient states that happens sometimes when he is watching TV and does not feel it and finds out that his underwear is wet and that he has to rush to the bathroom.  Patient has a history of slow-growing prostate cancer for the last 8 years.  He was told by his urologist that is slow-growing and that if he would die he would die from something completely different not the prostate cancer.  Patient also reports difficulty with balance.  Again his head, and neck CT scan were negative for abnormalities.  Patient also reports feeling tired if he walks for a little bit where he had to stop and sit.  He reports shopping cart sign.    Today patient returns to review the CT of the lumbar spine without contrast that were done on May 29, 2018 the lumbar CT scan shows severe trefoil type spinal canal stenosis at the L3-L4 with severe right and severe left neural foraminal stenosis.  Also at the L4-L5 there is severe that she felt tight spinal canal stenosis with moderate severe right and severe  left foraminal stenosis.  At the L5-S1 there is severe bilateral neural foraminal stenosis.  At the L2-L3 there is moderate spinal canal stenosis with mild bilateral neuroforaminal stenosis.  There is moderate spinal canal stenosis eccentric to the right at the L1-L2 with mild to moderate right neural foraminal stenosis. Patient denies unintentional weight loss, saddle anesthesia, fever or chills.       Review of Systems:  Bilateral low back pain, radicular pain to the right lower extremity.  Weakness of the lower extremity.  Symptoms of urinary incontinence.  Recent constipation for 7 days.  Balance difficulties.Patient denies urinary or bowel incontinence, unintentional weight loss, saddle anesthesia, fever or chills, balance difficulties.       Objective:   CONSTITUTIONAL:  Vital signs as above.  No acute distress.  The patient is well nourished and well groomed.    PSYCHIATRIC:  The patient is awake, alert, oriented to person, place and time.  The patient is answering questions appropriately with clear speech.  Normal affect.  SKIN:  Skin over the face, posterior torso, bilateral upper and lower extremities is clean, dry, intact without rashes.  LYMPH NODES:  No palpable or tender inguinal/popliteal lymph nodes.  VASCULAR:  2/4  pulses bilaterally.  Bilateral lower extremities are warm.  There is no pitting edema of the bilateral lower extremities.  Abdomen : soft, non-distended, non-tender throughout all quadrants.  No pulsatile mass palpated in the left lower quadrant.  MUSCULOSKELETAL:   Gait is antalgic.   The patient is able to heel and toe walk with difficulty.  Mild tenderness over the L4-L5, L5-S1 lumbar paraspinal muscles.     3+ /5 for the right great toe dorsal extension. The patient has 5/5 strength for the bilateral hip flexors, knee flexors/extensors, ankle dorsiflexors/plantar flexors, left great toe extensors, bilateral ankle evertors/invertors. Sensation to light touch is decreased in the right  L5 dermatome.  Sensation to light touch is intact in the bilateral L4, left L5, and bilateral S1 dermatomes.  Positive right straight leg raise.  Mild pain with hip impingement bilaterally.  Negative Jone test bilaterally.  Negative left straight leg raise.  NEUROLOGICAL:  3/4 patellar, medial hamstring, achilles reflexes which are symmetric bilaterally.  No ankle clonus bilaterally.  Sensation to light touch is intact in the bilateral L4, L5, and S1 dermatomes.       RESULTS:      [facility]  CT LUMBAR SPINE WO CONTRAST  5/29/2018 1:24 PM     INDICATION: Lumbalgia with right lower extremity radicular pain  TECHNIQUE: Helical thin-section CT scan of the lumbar spine was performed using bone reconstruction algorithm. From the axial source data, sagittal and coronal thin reformats. Dose reduction techniques were used.   IV CONTRAST: None.  COMPARISON: None.     FINDINGS: 5 lumbar type vertebra. 2 mm anterolisthesis at L4-L5. Slight lumbar levocurvature. Preserved vertebral body heights. No CT evidence for fracture or destructive osseous lesion. Moderate diffuse loss of intervertebral disc height throughout the   lumbar spine from L1 through S1 with multilevel endplate spurring, vacuum disc phenomenon, and circumferential disc osteophyte complexes most significant at L3-L4 and L4-L5. Partially mineralized right central disc protrusion at L1-L2. Moderate to   advanced multilevel lumbar facet arthropathy most significant at L4-L5. Lumbar spondylosis is superimposed upon diffuse developmental narrowing of the spinal canal related to shortened pedicles. At L1-L2, moderate spinal canal stenosis with asymmetric   narrowing of the right lateral recess. Mild to moderate right neural foraminal stenosis. At L2-L3, moderate spinal canal stenosis and mild bilateral neural foraminal stenosis. At L3-L4, severe trefoil type spinal canal stenosis with moderate to severe   right and severe left neural foraminal stenosis. At  L4-L5, severe trefoil type spinal canal stenosis with moderate to severe right and severe left neural foraminal stenosis. At L5-S1, low-grade narrowing of the lateral recesses without central spinal   canal stenosis. Severe bilateral neural foraminal stenosis.     Partially visualized enlargement of the prostate gland. Mild paraspinal muscular atrophy. Atherosclerotic changes of the aortoiliac system.     IMPRESSION:   CONCLUSION:  1.  At L3-L4, severe trefoil type spinal canal stenosis with moderate to severe right and severe left neural foraminal stenosis.  2.  At L4-L5, severe trefoil type spinal canal stenosis with moderate to severe right and severe left neural foraminal stenosis.  3.  At L5-S1, severe bilateral neural foraminal stenosis.  4.  At L2-L3, moderate spinal canal stenosis with mild bilateral neural foraminal stenosis.  5.  At L1-L2, moderate spinal canal stenosis eccentric right. Mild to moderate right neural foraminal stenosis.

## 2021-06-18 NOTE — PROCEDURES
[facility] Radiology Post Procedure    Procedure: lumbar puncture for lumbar myelogram    Radiologist: Fox Curran    Contrast: 15 ml omnipaque 180  Fluoro Time: 2.9 minutes  Number of images: 1     EBL: minimal  Complications: none    Preliminary findings: (see dictation for full detail)  Technically successful puncture at L2/3 for myelogram.    Assess/Plan:   To CT for lumbar myelogram  Bedrest for 2 hours    Fox Curran

## 2021-06-18 NOTE — PROGRESS NOTES
NEUROSURGERY CONSULTATION NOTE    6/12/2018     CHIEF COMPLAINT: Chronic low back pain in the robot    HPI:    Derrick Silva is a 86 y.o. male who is sent to us in consultation by Evelia Mendoza for further evaluation of chronic low back pain, lumbar spinal stenosis.    Derrick presents today with his son as well as his daughter-in-law regarding chronic low back pain.  Will notes that he has been experiencing low back pain for 40 years and this is been gradually worsening over the last year to 6 months.  His son notes that he does not complain much about pain, but notes that his father has significantly slowed down since December 2017.  Derrick is a poor historian, and he has difficulty providing details regarding his symptoms.  He notes that with regard to his back pain, he experiences a sharp burning sensation that worsens with ambulation.  He notes he will get cramping sensation in his legs with prolonged ambulation.  He presents today with a wheeled walker, which he and his family note he started using approximately 6 months ago.  Derrick notes that he uses it for help with his imbalance which has been progressively worsening, as well as persistence with back pain and it does help improve his ambulation distance.  With further investigation, Derrick admits that he has been experiencing numbness and tingling in his bilateral feet and legs for many years.  He additionally has had numbness in his hands for several years.  His son notes that after he had carpal tunnel surgery, his previous flexed posture of his fingers significantly improved, however he has continued to complain of persistent numbness and tingling.  Additionally, Derrick has urinary incontinence which has been present for several years and he also has a diagnosis of prostate cancer.  He characterizes his incontinence as an urgency issue, noting that when he has to go, it is immediate.  He additionally has struggled with constipation for the last  6-7 years.      Derrick's family notes that he struggled with other issues including depression and dementia for the last 6 months to a year.  They note that he moved into assisted living with his wife in April 2017, but he was previously in the independent living section of the facility.  However, one month ago he had to be transitioned into assisted living due to issues related to managing medications.  I also noted that Derrick has an extremely difficult time bringing himself from a sitting to a standing position, and family notes that this is been progressively worsening for the last 6 months.  His son states that initially it began his him needing to rock himself out of the chair, now he requires definite assistance to bring himself to stand.      Past Medical History:   Diagnosis Date     Aortic stenosis      Arthritis      Cancer     prostate     Chronic pain      Constipation      Depression      Diabetes mellitus      Diabetes mellitus, type II      Eczema      GERD (gastroesophageal reflux disease)      Heart disease      Hyperlipidemia      Hypertension      Osteoarthritis      Pacemaker      Past Surgical History:   Procedure Laterality Date     CARDIAC PACEMAKER PLACEMENT       CARPAL TUNNEL RELEASE  2014     HEMORRHOID SURGERY       REVIEW OF SYSTEMS:  Pt denies any neck pain or radiating pain in his bilateral extremities.  He denies any weakness in his arms. A full 14 point review of systems was otherwise completed and is negative aside from that mentioned above in the HPI    MEDICATIONS:    Current Outpatient Prescriptions   Medication Sig Dispense Refill     acetaminophen (TYLENOL EXTRA STRENGTH) 500 MG tablet Take 2 tablets (1,000 mg total) by mouth 3 (three) times a day. 100 tablet 2     amLODIPine (NORVASC) 10 MG tablet Take 10 mg by mouth daily.       aspirin-calcium carbonate 81 mg-300 mg calcium(777 mg) Tab Take 81 mg by mouth.       docusate sodium (COLACE) 100 MG capsule Take 100 mg by  "mouth.       famotidine (PEPCID) 20 MG tablet Take 20 mg by mouth.       gabapentin (NEURONTIN) 100 MG capsule Take 100 mg by mouth at bedtime. 30 capsule 1     losartan (COZAAR) 50 MG tablet Take 50 mg by mouth.       menthol 2.5 % Gel Apply topically.       metFORMIN (GLUCOPHAGE) 500 MG tablet 500 mg 2 (two) times a day with meals.       metoprolol succinate (TOPROL-XL) 50 MG 24 hr tablet Take 50 mg by mouth. 1 tablet in the AM, half tablet in the PM       nitroglycerin (NITROSTAT) 0.4 MG SL tablet Place 0.4 mg under the tongue.       oxyCODONE-acetaminophen (PERCOCET) 5-325 mg per tablet Take 1 tablet by mouth daily. 30 tablet 0     polyethylene glycol (GLYCOLAX) 17 gram/dose powder Take 17 g by mouth daily as needed. 119 g 0     sertraline (ZOLOFT) 100 MG tablet Take 1 tablet (100 mg total) by mouth daily. 90 tablet 0     simvastatin (ZOCOR) 40 MG tablet Take 0.5 tablets (20 mg total) by mouth at bedtime. 60 tablet 0     tamsulosin (FLOMAX) 0.4 mg Cp24 Take 0.4 mg by mouth daily.       No current facility-administered medications for this visit.          ALLERGIES/SENSITIVITIES:     No Known Allergies    PERTINENT SOCIAL HISTORY:   Social History     Social History     Marital status:      Spouse name: N/A     Number of children: 7     Years of education: 8     Social History Main Topics     Smoking status: Never Smoker     Smokeless tobacco: Never Used     Alcohol use No     Drug use: No     Sexual activity: Not Asked     Other Topics Concern     None     Social History Narrative         FAMILY HISTORY:  Family History   Problem Relation Age of Onset     No Medical Problems Mother      No Medical Problems Father      Parkinsonism Daughter         PHYSICAL EXAM:   Constitution: BP 94/50  Pulse 67  Ht 5' 6\" (1.676 m)  Wt 210 lb (95.3 kg)  SpO2 97%  BMI 33.89 kg/m2.   Awake, alert and in NAD  Eyes: Conjugate gaze. Conjunctiva benign without icterus or injection  Heart: RRR  Lungs: Non-labored " respiration without accessory muscle use  Skin: No obvious rash or lesion  Psych: Appropriate mood and affect, alert and oriented x 3  Mental Status:  Speech is fluent.  Recent and remote memory are intact.  Attention span and concentration are normal.     Cranial Nerves:  CN2: No funduscopic exam performed. CN3,4 & 6: Pupillary light response, lateral and vertical gaze normal.  No nystagmus. CN7: No facial weakness, smile, facial symmetry intact. CN8: Intact to spoken voice.      Motor: No pronator drift of upper extremity. Normal bulk and tone all muscle groups of upper and lower extremities. Strength is 5/5 in all muscle groups of the bilateral lower extremities.     Right Left   Deltoid 4** 4**   Biceps 5 5   Triceps 5 5   Wrist ex 5 5   Wrist flex 5 5   Finger ex 4++ 4+   Hand intrinsic 4 4    Moderately diminished Moderately diminished     Sensory: Sensation intact bilaterally to light touch and temperature throughout.  Pinprick sensation is diminished in the bilateral lower extremities, most significant below the knee, and worse on the feet on the plantar and dorsal surfaces, worse on the left than the right.  Vibratory sense is absent in the bilateral great toe.  Pinprick sensation is additionally diminished in the bilateral upper extremities greater distally in the hands and proximally in the arms.     Coordination: Patient exhibits extremely slow get up and go and requires a full person assist to bring himself from sitting to standing.  When standing he requires several moments to gather himself and he takes short small shuffeling, wide-based steps.     Reflexes; 2+ biceps, triceps, 3+ bilateral patellar, 2+ bilateral Achilles no Belinda.  No clonus.  Toes are downgoing response to plantar stimulation    Musculoskeletal: Negative straight leg raise bilaterally    IMAGING: I personally reviewed all radiographic images    CT myelogram lumbar spine 6/7/2018: There is evidence of diffuse multilevel lumbar  spondylosis.  There is mild spinal stenosis at L1-2.  At L2-3, there is moderate spinal stenosis, L3-4 and L4-5 there is severe central canal stenosis.    CONSULTATION ASSESSMENT AND PLAN:    Derrick Silva is an 86-year-old male with lumbar spondylosis, lumbar spinal stenosis, some symptoms possibly related to neurogenic claudication, however with additional numbness and tingling in his bilateral hands and arms, impaired get up and go, progressive cognitive impairment.    I reviewed Derrick's lumbar CT myelogram with him and his family today in clinic, noting his multilevel lumbar spondylosis and multilevel lumbar spinal stenosis.  I noted that he does have multiple clinical complaints that could be attributed to lumbar spinal stenosis such as back pain, leg pain with ambulation, urinary incontinence, however these could be attributed to other pathology is and he does have other complaints that make me concerned for other overarching pathology.  In particular I have concerns for possible underlying cervical spinal stenosis in light of his bilateral upper extremity numbness and tingling, his significant imbalance that is progressed over the last 6 months, and his difficulty with bringing himself to a standing position when he has no overt weakness with hip flexion on testing.  Recommended the family that he undergo cervical myelogram and we can additionally refer him to the spine center for consideration of an interlaminar epidural steroid injection to see if he expresses any improvement in his back pain.  We did review the potential surgery will be  required for the lumbar spine, such as a lumbar laminectomy, and we did discuss the risks, benefits and alternatives.  We will have or will obtain his cervical spine myelogram and follow-up with me for further discussion.  I additionally note that his family needs to keep his appointment with neurology or geriatrics which is pending for July 16 in light of his cognitive  decline as well.    I spent more than 60 minutes in this apt, examining the pt, reviewing the scans, reviewing notes from chart, discussing treatment options with risks and benefits and coordinating care. >50 % clinic time was spent in face to face counseling and coordinating care    Leann Olguin MD      Cc:   Jennifer Tan MD

## 2021-06-18 NOTE — PROGRESS NOTES
Updated appointment list sent to patient today to Jane Cox. Patient continues to follow up with spine clinic and neurosurgeon. Updated care team.       Next Outreach: 1 Month

## 2021-06-18 NOTE — PROGRESS NOTES
My Clinic Care Coordination Care Plan    [facility]  [address] [place]  [address]  [phone]    My Preferred Method of Contact:  Phone: Call Roseann at [phone]    My Primary/Preferred Language:  English    Preferred Learning Style:  n/a    Emergency Contact: Extended Emergency Contact Information  Primary Emergency Contact: Anamika Silva   [place]  Mobile Phone: [phone]  Relation: Daughter-In-Law  Secondary Emergency Contact: Omar Silva   [facility]  Home Phone: [phone]  Mobile Phone: [phone]  Relation: Child     Patient requested to call Roseann Hill, Daughter, at [phone]    PCP:  Jennifer Tan MD  Specialists:    Care Team            Jennifer Tan MD PCP - General, Family Medicine    [phone]     [facility]    [facility] Care Coordination Care Guide    Phone: [phone]  Fax: [phone]    Evelia Mendoza PA-C Physician Assistant, Physician Assistant    [phone]     [facility]  Cardiology    [phone]           Hospitalizations and/or ED Visits  Most Recent: 4/19/18     Previous:  n/a  Reason:  Muscle Cramps    Concerns regarding my health: Attending my appointments     Advanced Directive/Living Will: Not on file with the clinic. Please discuss with Dr. Tan.       Derrick elected to enroll in the [facility] Care Coordination.  Derrick was given a copy of the Clinic Care Coordination brochure and full description of how to access their care team both during clinic hours and after hours.   My Care Guide's Name and Phone Number:  Linsey B [phone]  The Care Guide and myself agreed to be in contact 1-2 months as needed. .      Medication Update  The patient's medication list is up to date per      Health Maintenance and Immunization Update  The patient's preventive health screenings and immunizations are up to  date per Dr. Tan.     Goals        Patient Stated      CCC-Patient Stated (pt-stated)            Action steps to achieve this goal  1.  I will talk with care guide at Outreach calls.   2.  My care guide can contact my daughter Roseann to inform her of my upcoming appointments or mail my calendar appointment.   3.  My daughter Roseann can help with reminding me of my appointments.   4. My family can call the clinic  to get my appointment calendar mailed out.     Date goal set:  5/10/18            My Emergency Plan  Depression  Everyone feels down at times. The blues are a natural part of life. But an unhappy period that s intense or lasts for more than a couple of weeks can be a sign of depression. Depression is a serious illness. It can disrupt the lives of family and friends. If you know someone you think may be depressed, find out what you can do to help.     Know the serious signals  Warning signals for suicide include:    Threats or talk of suicide    Statements such as  I won t be a problem much longer  or  Nothing matters     Giving away possessions or making a will or  arrangements    Buying a gun or other weapon    Sudden, unexplained cheerfulness or calm after a period of depression  If you notice any of these signs, get help right away. Call a health care professional, mental health clinic, or suicide hotline and ask what action to take. In an emergency, don t hesitate to call the police.     [facility] Mental Health Crisis Lines:  [facility] [phone]  [facility] [phone]  [facility] [phone]  [facility] [phone]  [facility], Adults [phone]  [facility], Children [phone]  [facility], Adults [phone]  [facility], Children [phone]     Diabetes: Sick-Day Plan  Infections, the flu, and even a cold, can cause your blood sugar to rise. And, eating less, nausea, and vomiting may cause your blood glucose to fall  (hypoglycemia). Ask your health care provider to help you develop a sick-day plan. The following information can help.     Call your health care provider if:    You vomit or have diarrhea for more than 6 hours.    Your blood glucose level is higher than usual or over 250 mg/dL after you have taken extra insulin (if recommended in your sick-day plan).    You take oral medicine for diabetes, and your blood sugar is higher than usual or over 250 mg/dL, before a meal and stays that high for more than 24 hours.    Your blood glucose is lower than usual or less than 70 mg/dL    You have moderate to large amounts of ketones in your blood or urine.    You aren t better after 2 days.    All [facility] clinic patients have access to a Nurse 24 hours a day, 7 days a week.  If you have questions or want advice from a Nurse, please know [facility] is here for you.  You can call your clinic and they will connect you or you can call Care Connection at [phone].  [facility] also has Walk In Care clinics in multiple locations.  Call the number listed above for more information about our Walk In Care clinics or visit the [facility] website at [email or web address].    Patient Support  I will ask Roseann for help in supporting me in these goals  Relationship to patient: Child  Cell Phone: [phone]

## 2021-06-19 NOTE — PROGRESS NOTES
Emergency Plan Recommendation:    When to Use the Emergency Department (ED)  An emergency means you could die if you don t get care quickly. Or you could be hurt permanently (disabled). Read below to know when to use -- and when not to use -- an emergency department (also called ED).    Dangers to your life  Here are examples of emergencies. These need immediate care:  A hard time breathing  Severe chest pain  Choking  Severe bleeding  Suddenly not able to move or speak  Blacking out (fainting)`  Poisoning    Dangers of permanent injuries  Here are other emergencies. These also need immediate care:  Deep cuts or severe burns  Broken bones, or sudden severe pain and swelling in a joint    When it s an emergency  If you have an emergency, follow these steps:    1. Go to the nearest emergency department  If you can, go to the hospital ED closest to you right away.  If you cannot get there right away, or if it is not safe to take yourself, call 911 or your police emergency number.  2. Call your primary care doctor  Tell your doctor about the emergency. Call within 24 hours of going to the ED.  If you cannot call, have someone call for you.  Go to your doctor (not the ED) for any follow-up care.    When it s not an emergency  If a problem is not an emergency, follow these steps:    1. Call your primary care doctor  If you don t know the name of your doctor, call your health plan.  If you cannot call, have someone call for you.  2. Follow instructions  Your doctor will tell you what you should do.  You may be told to see your doctor right away. You may be told to go to the ED. Or you may be told to go to an urgent care center.  Follow your doctor s advice.  Managing Chronic Pain: Medicines  Medicines can help you live better with chronic pain. You may use over-the-counter or prescription medicines. It can take some time and trial and error to work out the best treatment plan for you. Work with your health care provider to  find the best medicines for you, and to use them safely and effectively.  Tell your health care provider about all medicines you`re taking, including herbs and vitamins.   A part of your treatment plan  Depending on your situation and the type of pain, you may take medicines:    At times when pain is more intense than usual.    For pain relief throughout the day.    Before activities that tend to trigger pain, like going shopping or doing physical therapy.     To decrease sensitivity to pain and help you sleep.  There are 4 major groupings of medicines for the treatment of chronic pain:  Non-opioids. These include the commonly used medicine acetaminophen as well as the non-steroidal anti-inflammatory drugs (NSAIDs), like aspirin and ibuprofen, naproxen sodium, and ketoprofen. These all help control pain but NSAIDs also help relieve inflammation. These drugs are available over-the-counter. Some NSAIDS are available by prescription only.  Acetaminophen can cause liver damage if taken above the recommended dose. NSAIDs may cause stomach problems like bleeding ulcers. Using them over the long-term can cause heart problems and stroke in a very small number of people. None of these drugs is addictive.  Opioids. This includes drugs, like morphine, oxycodone, codeine, fentanyl, and methadone. Opioids may be used to treat breakthrough pain or severe chronic pain. Opioids are available only by prescription. These medicines may be effective for managing chronic pain. But they are controversial because of their side effects and because they can be addictive.    Adjuvants. This group includes medicines that were originally made to treat other conditions, but were also found to relieve pain. Examples of adjuvant drugs include antidepressants and anticonvulsants.  Antidepressants. These help pain by working on the same brain chemicals that play a role in depression. They also help improve sleep. Tricyclic antidepressants are 1  group of antidepressants used for treating chronic pain caused by nerve injury (neuropathic pain). Examples include amitriptyline, nortriptyline, and desipramine. Serotonin-norepinephrine reuptake inhibitors (SNRIs), like duloxetine and milnacipran, are also used.  Some types of antidepressants are used in low doses for sleep problems. They may also be prescribed if you are very sensitive to pain or some kinds of nerve pain.  Anticonvulsants, developed to prevent seizures, can help certain pain conditions, particularly nerve (neuropathic) pain. Examples include gabapentin and pregabalin.  Other pain medicines    Topical. These medicines, like lidocaine or capsaicin, are applied to the skin to treat pain in one location.    Muscle relaxants. These may be used to stop painful muscle spasms. Drugs like cyclobenzaprine can be sedating.  Taking medicine safely    Take your medicine on time and in the right dose as prescribed.    Tell your health care professional if your medicine doesn't relieve your pain or work for a long enough time, or if you have side effects.    Don't take other people's medicines. They may not be safe for you.  Avoid alcohol, tobacco, and illegal drugs. These may interact with your medicines causing you harm.

## 2021-06-19 NOTE — PROGRESS NOTES
ASSESSMENT/ PLAN    1. Hallucinations  2. Late onset Alzheimer's disease without behavioral disturbance  Has not been given Zyprexa. Will resend new script.   - OLANZapine zydis (ZYPREXA) 5 MG disintegrating tablet; Take 1 tablet (5 mg total) by mouth at bedtime.  Dispense: 90 tablet; Refill: 1    3. Insomnia, unspecified type  Will have this given to patient at bedtime.   - melatonin 3 mg Tab tablet; Take 1 tablet (3 mg total) by mouth at bedtime.  Dispense: 90 tablet; Refill: 1    4. Constipation, unspecified constipation type  2/2 narcotics. Has not been given Miralax, resent prescription for patient.   - polyethylene glycol (MIRALAX) 17 gram packet; Take 1 packet (17 g total) by mouth daily.  Dispense: 72 each; Refill: 3    5. Other chronic pain  Has not been given lidocaine patches by assisted living staff. Resent prescription.   - lidocaine 4 % patch; Place 1 patch on the skin daily.  Dispense: 90 patch; Refill: 1    6. Benign essential hypertension  Appears to be taking amlodipine 10 mg daily instead of 5 mg as prescribed by us. Resent new prescription with correct dose.   - amLODIPine (NORVASC) 5 MG tablet; Take 1 tablet (5 mg total) by mouth daily.  Dispense: 90 tablet; Refill: 1    7. Venous stasis dermatitis of both lower extremities  Advised conservative measures including leg elevation and compression stockings. Gave patient script for compression stocking, if unaffordable recommended [employer].   - Compression stockings 20/30 mmHg; Knee    8. Bilateral impacted cerumen  Nursing staff irrigated his ears.     F/u in 3 months    This note was created using Dragon dictation software, spelling errors may occur.     Jennifer Tan MD        SUBJECTIVE   Derrick Silva is a 86 y.o. old male with a past medical history of type 2 diabetes, coronary artery disease, prosstate cancer, hypertension, dementia who presented to clinic today for further evaluation of bilateral LE swelling, whole body rash, and  medication check. Son Josue was with patient. Patient has been having LE swelling bilaterally for sometimes. His children have bought him a reclining chair. He doesn't wear compression stockings. Also he broke out in a rash and has been using triamcinolone cream which helped and rash went away but the rash remained in his legs. Son tells us today that it seems that patient isn't taking medications as listed from list given by our clinic.       Review of Systems:  Negative except as noted in HPI    The following portions of the patient's history were reviewed and updated as appropriate: past medical history, past surgical history, family history, allergies, current medications and problem list.    Medical History  Active Ambulatory (Non-Hospital) Problems    Diagnosis     Cognitive and behavioral changes     Weakness     AIDEE (acute kidney injury) (H)     Confusion     Hallucinations     Late onset Alzheimer's disease without behavioral disturbance     Diabetes mellitus, type II (H)     Spinal stenosis of lumbosacral region     Chronic pain     Has written advance directive indicating do not resuscitate status     Chronic stable angina (H)     Depression, unspecified depression type     Eczema     Constipation     Diabetes mellitus type 2, controlled, with complications (H)     Obesity     Aortic stenosis     Hyperlipidemia LDL goal <100     Coronary artery disease of native artery with stable angina pectoris (H)     Benign essential hypertension     BPH (benign prostatic hyperplasia)     GERD (gastroesophageal reflux disease)     Onychia of toe     Osteoarthritis     Prostate cancer (H)     History of adenocarcinoma of prostate     Encounter for long-term (current) use of high-risk medication     Onychomycosis     Past Medical History:   Diagnosis Date     Aortic stenosis      Arthritis      Cancer (H)      Chronic pain      Constipation      Depression      Diabetes mellitus (H)      Diabetes mellitus, type II (H)       Eczema      Encephalopathy acute 7/14/2018     GERD (gastroesophageal reflux disease)      Heart disease      Hyperlipidemia      Hypertension      Osteoarthritis      Pacemaker        Surgical History  He  has a past surgical history that includes Carpal tunnel release (2014); Cardiac pacemaker placement; and Hemorrhoid surgery.    Social History  Reviewed, and he  reports that he has never smoked. He has never used smokeless tobacco. He reports that he does not drink alcohol or use illicit drugs.    Family History  Reviewed, and family history includes No Medical Problems in his father and mother; Parkinsonism in his daughter.    Medications  Reviewed and reconciled    Allergies  No Known Allergies      OBJECTIVE  Physical Exam:  Vital signs: /70 (Patient Site: Left Arm, Patient Position: Sitting, Cuff Size: Adult Regular)  Pulse 65  Temp 97.5  F (36.4  C) (Oral)   Wt 213 lb (96.6 kg)  SpO2 96%  BMI 30.56 kg/m2  Weight: 213 lb (96.6 kg)    General appearance: pleasant, appears stated age, cooperative and in no distress  Eyes: EOMs intact, PERRL, conjunctivae normal.   ENT: moist oral mucosa, posterior oropharynx normal, TMs with cerumen impaction bilaterally.   Lymph: no cervical/supraclavicular adenopathy  Respiratory: clear to auscultation bilaterally, good air movement throughout, no wheezing or crackles, speaking full sentences without difficulty  Cardiovascular: regular rate and rhythm, no murmur appreciated, 1+ pitting leg edema up to mid shin.  Skin: venous stasis dermatitis bilateral LEs.   Neuro: alert oriented x 3, grossly normal otherwise  Psych: normal affect, appropriate conversation

## 2021-06-19 NOTE — PROGRESS NOTES
ASSESSMENT/ PLAN    1. Hospital discharge follow-up  Reviewed hospital records, lab work, imaging.  CT head without contrast unremarkable.  Chest x-ray unremarkable.  He had AIDEE but improved with IVF.  Vitamin B12 normal.  Troponin negative.  Thyroid normal.  CBC unremarkable.  Also performed a face to face evaluation for home health for skilled nursing and PT. Patient is homebound due to dementia, hallucinations and multiple other medical conditions including chronic pain syndrome leading to gait instability and frequent falls, diabetes, coronary artery disease.   - Comprehensive Metabolic Panel  - Ambulatory referral to Home Health    2. AIDEE (acute kidney injury) (H)  Improved with IV fluid.  Will check kidney function today.  Does have baseline CKD stage III.  - Comprehensive Metabolic Panel  - Ambulatory referral to Home Health    3. Late onset Alzheimer's disease without behavioral disturbance  Patient presented to the hospital for altered mental status and hallucination.  Was started on Zyprexa 5 mg and this has improved his symptoms.  He does have upcoming neurology evaluation.  - Ambulatory referral to Home Health    4. Controlled type 2 diabetes mellitus with complication, without long-term current use of insulin (H)  Metformin was discontinued during hospitalization.  A1c today is 6.9.  Continue to monitor for now.  Will discontinue blood sugar check daily.  - Glycosylated Hemoglobin A1c  - Ambulatory referral to Home Health    5. Hallucinations  6. Confusion  Zyprexa seems to control this pretty well.  - Ambulatory referral to Home Health    7. Chronic pain syndrome  8. Spinal stenosis of lumbosacral region  On Tylenol 1000 mg three times a day and Vicodin 5 mg twice daily as needed.  Encourage patient to ask for the oxycodone as needed.  - Ambulatory referral to Home Health    9. Benign essential hypertension  Well-controlled today.  Continue with losartan, amlodipine, metoprolol  - Ambulatory referral  to Home Health    10. Hyperlipidemia LDL goal <100  Will discontinue simvastatin given low benefit and his age and overall medical status.  His son Tim agree with this.  - Ambulatory referral to Home Health    11. Constipation, unspecified constipation type  He was discharged on MiraLAX from the hospital.  Will add on senna.  Advised patient to add on Metamucil daily.  Follow-up in 3 months.  - senna (SENOKOT) 8.6 mg tablet; Take 1 tablet by mouth 2 (two) times a day.  Dispense: 60 tablet; Refill: 1  - Ambulatory referral to Home Health    12. Fungal nail infection  I trimmed his nail today as much as I can.  I advised patient to start soaking his feet daily.  He should soak his feet prior to coming back to see me in 3 months I can trim his nail again.  Advised using Vicks VapoRub on his nails.  Would avoid antifungal oral medication at this point.      This note was created using Dragon dictation software, spelling errors may occur.     Jennifer Tan MD        SUBJECTIVE   Derrick LUISA Silva is a 86 y.o. old male with a past medical history of type 2 diabetes, coronary artery disease, state cancer, hypertension who presented to clinic today for further evaluation of hospital discharge follow up.  He was hospitalized at [facility] from 7/13/2018 to 7/17/2018 for altered mental status confusion and hallucination.  I reviewed his entire hospital record.  His agitation and encephalopathy was thought to be secondary to underlying dementia and Zyprexa was started.  His chronic back pain medication was switched from Percocet to Tylenol thousand milligrams 3 times daily and oxycodone 5 mg twice daily as needed with better control of his pain.  Metformin was discontinued due to recent hemoglobin of 6.1.  He was seen by neurology due to hospitalization head CT shows no acute changes but MRI could not be performed due to pacemaker.  He was also discharged on lidocaine 4% patch as well as MiraLAX for his  constipation.  He is here with his son Tim.  Patient reports that he feels very well for since hospital discharge.  He actually tells me that he was looking forward to this doctor's visit versus before he never wanted to go see the doctor.  He feels very positive.  He is 1 instance of hallucination, seeing bugs on the ceiling but patient does not seem to be in distress because of this hallucination.  He feels that his back pain is slowly coming back but he feels that is well controlled compared to before.  Would like for me to look at his feet today.      Review of Systems:  Negative except as noted in HPI    The following portions of the patient's history were reviewed and updated as appropriate: past medical history, past surgical history, family history, allergies, current medications and problem list.    Medical History  Active Ambulatory (Non-Hospital) Problems    Diagnosis     Cognitive and behavioral changes     Weakness     AIDEE (acute kidney injury) (H)     Confusion     Hallucinations     Late onset Alzheimer's disease without behavioral disturbance     Diabetes mellitus, type II (H)     Spinal stenosis of lumbosacral region     Chronic pain     Has written advance directive indicating do not resuscitate status     Chronic stable angina (H)     Depression, unspecified depression type     Eczema     Constipation     Diabetes mellitus type 2, controlled, with complications (H)     Obesity     Aortic stenosis     Hyperlipidemia LDL goal <100     Coronary artery disease of native artery with stable angina pectoris (H)     Benign essential hypertension     BPH (benign prostatic hyperplasia)     GERD (gastroesophageal reflux disease)     Onychia of toe     Osteoarthritis     Prostate cancer (H)     History of adenocarcinoma of prostate     Encounter for long-term (current) use of high-risk medication     Onychomycosis     Past Medical History:   Diagnosis Date     Aortic stenosis      Arthritis      Cancer (H)       Chronic pain      Constipation      Depression      Diabetes mellitus (H)      Diabetes mellitus, type II (H)      Eczema      Encephalopathy acute 7/14/2018     GERD (gastroesophageal reflux disease)      Heart disease      Hyperlipidemia      Hypertension      Osteoarthritis      Pacemaker        Surgical History  He  has a past surgical history that includes Carpal tunnel release (2014); Cardiac pacemaker placement; and Hemorrhoid surgery.    Social History  Reviewed, and he  reports that he has never smoked. He has never used smokeless tobacco. He reports that he does not drink alcohol or use illicit drugs.    Family History  Reviewed, and family history includes No Medical Problems in his father and mother; Parkinsonism in his daughter.    Medications  Reviewed and reconciled    Allergies  No Known Allergies      OBJECTIVE  Physical Exam:  Vital signs: /52 (Patient Site: Left Arm, Patient Position: Sitting, Cuff Size: Adult Regular)  Pulse 65  Temp 96.9  F (36.1  C) (Oral)   Wt 205 lb (93 kg)  SpO2 98%  BMI 29.41 kg/m2  Weight: 205 lb (93 kg)    General appearance: pleasant, appears stated age, cooperative and in no distress  Eyes: EOMs intact, PERRL, conjunctivae normal.   ENT: moist oral mucosa, posterior oropharynx normal  Lymph: no cervical/supraclavicular adenopathy  Respiratory: clear to auscultation bilaterally, good air movement throughout, no wheezing or crackles, speaking full sentences without difficulty  Cardiovascular: regular rate and rhythm, no murmur appreciated, no leg edema  Abdomen: active bowel sounds, soft, non-tender, non-distended  Musculoskeletal: Fungal nail infection in all toenails  Skin: no rashes  Neuro: alert oriented x 3, pleasant, appropriate conversation  Psych: normal affect, appropriate conversation

## 2021-06-19 NOTE — PROGRESS NOTES
Pt in Memory Care Unit at [facility]. Per [facility] policy, patient's care will be coordinated through [facility] moving forward. Patient will be graduated from [facility].

## 2021-06-19 NOTE — PROGRESS NOTES
Sent updated apt calendar in mail. Pt was transferred to Memory Care Unit at [facility]. Per [facility] policy, patient's care will be coordinated through [facility], not [facility]. Will send chart to [facility] RN for graduation review.

## 2021-06-19 NOTE — LETTER
Letter by Adolfo Ellis MD (Ted) at      Author: Adolfo Ellis MD (Ted) Service: -- Author Type: --    Filed:  Encounter Date: 4/15/2019 Status: (Other)         Derrick MORAN Shira  [address]  [facility] [address]      April 15, 2019      Dear Derrick,    This letter is to remind you that you will be due for your follow up appointment with Dr. Jonny Ellis  . To help ensure you are in the best health possible, a regular follow-up with your cardiologist is essential.     Please call our Patient Scheduling Line at [phone] to schedule your appointment at your earliest convenience.  If you have recently scheduled an appointment, please disregard this letter.    We look forward to seeing you again. As always, we are available at the number  above for any questions or concerns you may have.      Sincerely,     The Physicians and Staff of [facility]

## 2021-06-19 NOTE — PROGRESS NOTES
My Clinic Care Coordination Wellness Plan  This Graduation Wellness Plan provides private information in regards to the work I have done with my Care Team from my Primary Care Clinic.  This document provides insight on the goals I have accomplished.  My Care Team congratulates me on my journey to maintain wellness.  This document will help guide me on my journey to maintain a healthy lifestyle.  I will use this to help me overcome any barriers I may encounter.  If I should have any questions or concerns, I will continue to contact the members of my Care Team or contact my Primary Care Clinic for assistance.      [facility]  [address] [place]  [address]  [phone]    My Preferred Method of Contact:  Phone: [phone]    My Primary/Preferred Language:  English    Emergency Contact: Extended Emergency Contact Information  Primary Emergency Contact: Anamika Silva   [place]  Mobile Phone: [phone]  Relation: Daughter-In-Law  Secondary Emergency Contact: Omar Silva   [facility]  Home Phone: [phone]  Mobile Phone: [phone]  Relation: Child     PCP:  Jennifer Tan MD  Specialists:    Care Team            Jennifer Tan MD PCP - General, Family Medicine    [phone]     [facility] Care Coordination Care Guide    Phone: [phone]  Fax: [phone]    Evelia Mendoza PA-C Physician Assistant, Physician Assistant    [phone]     [facility]  Cardiology    [phone]     Leann Olguin MD Physician, Neurosurgery    [phone]         Accomplishments:  Goals        Patient Stated      COMPLETED: [facility]-Patient Stated (pt-stated)            Action steps to achieve this goal  1.  I will talk with care guide at Outreach calls.   2.  My care guide can contact my daughter Roseann to inform her of my upcoming appointments or mail my calendar appointment.    3.  My daughter Roseann can help with reminding me of my appointments.   4. My family can call the clinic  to get my appointment calendar mailed out.     Date goal set:  5/10/18            Advanced Directive/Living Will: Not on file with the clinic.     Clinical Emergency Plan  When to Use the Emergency Department (ED)  An emergency means you could die if you don t get care quickly. Or you could be hurt permanently (disabled). Read below to know when to use -- and when not to use -- an emergency department (also called ED).     Dangers to your life  Here are examples of emergencies. These need immediate care:  A hard time breathing  Severe chest pain  Choking  Severe bleeding  Suddenly not able to move or speak  Blacking out (fainting)`  Poisoning     Dangers of permanent injuries  Here are other emergencies. These also need immediate care:  Deep cuts or severe burns  Broken bones, or sudden severe pain and swelling in a joint     When it s an emergency  If you have an emergency, follow these steps:     1. Go to the nearest emergency department  If you can, go to the hospital ED closest to you right away.  If you cannot get there right away, or if it is not safe to take yourself, call 911 or your police emergency number.  2. Call your primary care doctor  Tell your doctor about the emergency. Call within 24 hours of going to the ED.  If you cannot call, have someone call for you.  Go to your doctor (not the ED) for any follow-up care.     When it s not an emergency  If a problem is not an emergency, follow these steps:     1. Call your primary care doctor  If you don t know the name of your doctor, call your health plan.  If you cannot call, have someone call for you.  2. Follow instructions  Your doctor will tell you what you should do.  You may be told to see your doctor right away. You may be told to go to the ED. Or you may be told to go to an urgent care center.  Follow your doctor s  advice.  Managing Chronic Pain: Medicines  Medicines can help you live better with chronic pain. You may use over-the-counter or prescription medicines. It can take some time and trial and error to work out the best treatment plan for you. Work with your health care provider to find the best medicines for you, and to use them safely and effectively.  Tell your health care provider about all medicines you`re taking, including herbs and vitamins.   A part of your treatment plan  Depending on your situation and the type of pain, you may take medicines:    At times when pain is more intense than usual.    For pain relief throughout the day.    Before activities that tend to trigger pain, like going shopping or doing physical therapy.     To decrease sensitivity to pain and help you sleep.  There are 4 major groupings of medicines for the treatment of chronic pain:  Non-opioids. These include the commonly used medicine acetaminophen as well as the non-steroidal anti-inflammatory drugs (NSAIDs), like aspirin and ibuprofen, naproxen sodium, and ketoprofen. These all help control pain but NSAIDs also help relieve inflammation. These drugs are available over-the-counter. Some NSAIDS are available by prescription only.  Acetaminophen can cause liver damage if taken above the recommended dose. NSAIDs may cause stomach problems like bleeding ulcers. Using them over the long-term can cause heart problems and stroke in a very small number of people. None of these drugs is addictive.  Opioids. This includes drugs, like morphine, oxycodone, codeine, fentanyl, and methadone. Opioids may be used to treat breakthrough pain or severe chronic pain. Opioids are available only by prescription. These medicines may be effective for managing chronic pain. But they are controversial because of their side effects and because they can be addictive.    Adjuvants. This group includes medicines that were originally made to treat other conditions,  but were also found to relieve pain. Examples of adjuvant drugs include antidepressants and anticonvulsants.  Antidepressants. These help pain by working on the same brain chemicals that play a role in depression. They also help improve sleep. Tricyclic antidepressants are 1 group of antidepressants used for treating chronic pain caused by nerve injury (neuropathic pain). Examples include amitriptyline, nortriptyline, and desipramine. Serotonin-norepinephrine reuptake inhibitors (SNRIs), like duloxetine and milnacipran, are also used.  Some types of antidepressants are used in low doses for sleep problems. They may also be prescribed if you are very sensitive to pain or some kinds of nerve pain.  Anticonvulsants, developed to prevent seizures, can help certain pain conditions, particularly nerve (neuropathic) pain. Examples include gabapentin and pregabalin.  Other pain medicines    Topical. These medicines, like lidocaine or capsaicin, are applied to the skin to treat pain in one location.    Muscle relaxants. These may be used to stop painful muscle spasms. Drugs like cyclobenzaprine can be sedating.  Taking medicine safely    Take your medicine on time and in the right dose as prescribed.    Tell your health care professional if your medicine doesn't relieve your pain or work for a long enough time, or if you have side effects.    Don't take other people's medicines. They may not be safe for you.  Avoid alcohol, tobacco, and illegal drugs. These may interact with your medicines causing you harm.         All [facility] clinic patients have access to a Nurse 24 hours a day, 7 days a week.  If you have questions or want advice from a Nurse, please know [facility] is here for you.  You can call your clinic and they will connect you or you can call [facility] at [phone].  [facility] also has Walk In Care clinics in multiple locations.  Call the number listed above for more information about our Walk In Care  clinics or visit the [email or web address] website at [email or web address].
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
## 2021-06-20 NOTE — LETTER
Letter by Lizbeth Garcia EPS at      Author: Lizbeth Garcia EPS Service: -- Author Type: --    Filed:  Encounter Date: 1/20/2020 Status: Signed         Derrick Silva  [address]  [facility] [address]      January 20, 2020      Dear Mr. Silva,    RE: Remote Results    We are writing to you regarding your recent Remote Pacemaker check from home. Your transmission was received successfully. Battery status is satisfactory at this time.     Your results are being reviewed.  You will be contacted if further information is necessary.     Your next check with be a Rep Check at your Care Center. We will call to schedule this.     To schedule or reschedule, please call [phone] and press 1.    NOTE: If you would like to do an extra transmission, please call [phone] and press 3 to speak to a nurse BEFORE transmitting. This ensures that the Device Clinic staff is aware of the reason you are sending a transmission, and can follow-up with you after it has been reviewed.    We will be checking your implanted device from home (remotely) every three months unless otherwise instructed. We will need to see you in the clinic at least once a year. You may need to be seen in the clinic sooner depending on the results of your check.    Please be aware:    The follow-up schedule is like a Physician prescription.    Your remote monitor is paired to your specific implanted device.      Sincerely,    [facility]

## 2021-06-20 NOTE — PROGRESS NOTES
[facility]    Name:   Derrick Silva  : 3/7/1932  Facility:   [facility] [[id number]]   Room:   Code Status: DNR -   Fac type:   SNF (Skilled Nursing Facility, TCU) -     CHIEF COMPLAINT / REASON FOR VISIT:  Chief Complaint   Patient presents with     Review Of Multiple Medical Conditions     TCU follow-up after hospitalization for dyspnea, acute encephalopathy, uncontrolled hypertension, and severe coronary artery disease.     [facility] from 18 until 18    Patient was last seen by me on 18 and subsequently seen by Spring Adrian on 18 and Dr. Moyer on 10/01/18.    HPI: Derrick is a 86 y.o. 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 Pedrito Aguila.  He presented to the ER on 18 from his care center with shortness of breath.  According to the EKG, he did have some new T-wave inversions, and it was complicated by a left bundle branch block.  He, himself, described worsening tremors, and his labs did show elevated LFTs.  His son stated that he sometimes got worsening tremors and dyspnea when he was agitated.  There was no nausea or vomiting, right upper quadrant or abdominal pain.  Initially, he was to remain for observation overnight.    Chest CT revealed patchy opacities in the lungs, likely a combination of motion and atelectasis.  There was no consolidation.  There was mild cardiomegaly/right heart enlargement and severe coronary calcifications.  CT also identified a renal cyst, although family did not want a workup despite understanding that the 1.8 cm nodule could be malignant.    His LFTs were elevated, but did improve.    For his severe CAD, he did see cardiology, and Imdur was added.      He also had significant constipation (a good deal of stool seen on CT) and was started on senna S.  He was also given bisacodyl suppositories and lactulose.    He has  "BPH/urinary retention he did pass a voiding trial, and the Lin catheter was discontinued with tamsulosin restarted.    He has diabetes mellitus type 2 which has been diet controlled.  His last A1c was 6.9.  While in the hospital, he was on sliding scale insulin.    He is on quetiapine 12.5 mg every 6 hours as needed, apparently experiencing hallucinations due to acute encephalopathy.        CURRENT ISSUES    We did eventually discontinue his quetiapine on 09/28/18.    Nursing staff report that he is rehabbing well, although he is not yet back to baseline.  He is still somewhat slow to respond.  His family would like him to return to assisted living with his wife.    ROS: He is confused but seems able to respond adequately to a review of systems.  Previously telling me he \"got winded down in exercises,\" he seems to be doing much better.  He continues to have minimal low back pain (\"not much anymore\") for which she receives a Lidoderm patch.  He denies headaches or chest pain.  Today, he denies any dizziness.  Sleeping is okay.  Bowels are not perfect but moving.  He does admit to urinary incontinence, and possibly even some fecal incontinence.    Past Medical History:   Diagnosis Date     Aortic stenosis      Arthritis      Cancer (H)     prostate     Chronic pain      Constipation      Depression      Diabetes mellitus (H)      Diabetes mellitus, type II (H)      Eczema      Encephalopathy acute 7/14/2018     GERD (gastroesophageal reflux disease)      Heart disease      Hyperlipidemia      Hypertension      Osteoarthritis      Pacemaker               Family History   Problem Relation Age of Onset     No Medical Problems Mother      No Medical Problems Father      Parkinsonism Daughter      Social History     Social History     Marital status:      Spouse name: N/A     Number of children: 7     Years of education: 8     Social History Main Topics     Smoking status: Never Smoker     Smokeless tobacco: Never " Used     Alcohol use No     Drug use: No     Sexual activity: Not on file     Other Topics Concern     Not on file     Social History Narrative       MEDICATIONS: Reviewed from the MAR, physician orders, and/or earlier progress notes.  Current Outpatient Prescriptions   Medication Sig     acetaminophen (MAPAP EXTRA STRENGTH) 500 MG tablet Take 2 tablets (1,000 mg total) by mouth 3 (three) times a day. -Not to exceed 4000 Mg APAP/24Hrs     amLODIPine (NORVASC) 5 MG tablet Take 1 tablet (5 mg total) by mouth daily.     aspirin 81 mg chewable tablet 1 TAB BY MOUTH DAILY (8PM)     famotidine (PEPCID) 20 MG tablet Take 1 tablet (20 mg total) by mouth daily.     isosorbide mononitrate (IMDUR) 30 MG 24 hr tablet Take 1 tablet (30 mg total) by mouth daily.     lidocaine (ASPERCREME, LIDOCAINE,) 4 % patch Place 2 patches on the skin daily. Remove and discard patch with 12 hours or as directed by MD.     losartan (COZAAR) 50 MG tablet Take 1 tablet (50 mg total) by mouth daily.     melatonin 3 mg Tab tablet Take 1 tablet (3 mg total) by mouth at bedtime.     menthol-zinc oxide (CALMOSEPTINE) 0.44-20.6 % Oint ointment Apply to buttock 4 times daily as needed for skin irritation     metoprolol succinate (TOPROL-XL) 50 MG 24 hr tablet Take 25 mg by mouth at bedtime.     metoprolol succinate (TOPROL-XL) 50 MG 24 hr tablet Take 50 mg by mouth every morning.     nitroglycerin (NITROSTAT) 0.4 MG SL tablet DISSOLVE 1 TAB UNDER TONGUE EVERY 5 MINUTES UP TO 3 DOSES AS NEEDED FOR CHEST PAIN     OLANZapine zydis (ZYPREXA) 5 MG disintegrating tablet Take 1 tablet (5 mg total) by mouth at bedtime.     oxyCODONE (ROXICODONE) 5 MG immediate release tablet Take 1 tablet (5 mg total) by mouth 2 (two) times a day as needed.     rivastigmine (EXELON) 4.6 mg/24 hr Place 1 patch on the skin daily.     senna-docusate (PERICOLACE) 8.6-50 mg tablet Take 1 tablet by mouth 2 (two) times a day.     sertraline (ZOLOFT) 100 MG tablet 1 TAB BY MOUTH EVERY  "MORNING     tamsulosin (FLOMAX) 0.4 mg Cp24 Take 1 capsule (0.4 mg total) by mouth daily.     traZODone (DESYREL) 50 MG tablet Take 0.5 tablets (25 mg total) by mouth at bedtime.     ALLERGIES: No Known Allergies    DIET: Regular, regular texture, thin liquids    Vitals:    10/02/18 1433   BP: 116/82   Pulse: (!) 57   Resp: 18   Temp: 96.9  F (36.1  C)   SpO2: 96%   Weight: 199 lb (90.3 kg)   Height: 5' 7\" (1.702 m)   Continue to monitor elevated blood pressures.  Body mass index is 31.17 kg/(m^2).    EXAMINATION:   General: Pleasant, obese elderly male, up in a wheelchair, in no apparent distress.  Head: Normocephalic and atraumatic.   Eyes: PERRLA, sclerae clear.   ENT: Moist oral mucosa.  He has his own teeth.  No rhinorrhea or nasal discharge.  Hearing may be mildly impaired.  Cardiovascular: Regular rate and rhythm with a 2/6 pansystolic murmur at the left MCL.   Respiratory: Lungs clear to auscultation bilaterally.   Abdomen: Obese but soft and nontender.   Musculoskeletal/Extremities: Age-related degenerative joint disease.  No peripheral edema.  Integument: No rashes, clinically significant lesions, or skin breakdown.   Cognitive/Psychiatric: Cognitive deficits are rather obvious.  When asked the year, he tells me 2013.  When asked the month, he tells me October.    DIAGNOSTICS:   Results for orders placed or performed during the hospital encounter of 09/19/18   Basic Metabolic Panel   Result Value Ref Range    Sodium 138 136 - 145 mmol/L    Potassium 3.8 3.5 - 5.0 mmol/L    Chloride 110 (H) 98 - 107 mmol/L    CO2 19 (L) 22 - 31 mmol/L    Anion Gap, Calculation 9 5 - 18 mmol/L    Glucose 154 (H) 70 - 125 mg/dL    Calcium 9.0 8.5 - 10.5 mg/dL    BUN 31 (H) 8 - 28 mg/dL    Creatinine 1.24 0.70 - 1.30 mg/dL    GFR MDRD Af Amer >60 >60 mL/min/1.73m2    GFR MDRD Non Af Amer 55 (L) >60 mL/min/1.73m2     Lab Results   Component Value Date    WBC 7.5 09/23/2018    HGB 12.6 (L) 09/23/2018    HCT 38.4 (L) 09/23/2018 "    MCV 97 09/23/2018     09/23/2018     CrCl cannot be calculated (Patient's most recent sCr result is older than the maximum 5 days allowed.).  Lab Results   Component Value Date    HGBA1C 6.9 (H) 07/26/2018     Lab Results   Component Value Date    ALT 56 (H) 09/24/2018    AST 34 09/24/2018    ALKPHOS 256 (H) 09/24/2018    BILITOT 0.5 09/24/2018       ASSESSMENT/Plan:      ICD-10-CM    1. SOB (shortness of breath) R06.02    2. Late onset Alzheimer's disease without behavioral disturbance G30.1     F02.80    3. Coronary artery disease of native artery of native heart with stable angina pectoris (H) I25.118    4. Benign prostatic hyperplasia with urinary retention N40.1     R33.8    5. Controlled type 2 diabetes mellitus without complication, without long-term current use of insulin (H) E11.9    6. Constipation, unspecified constipation type K59.00      CHANGES:    None.    CARE PLAN:    The care plan has been reviewed and all orders signed. Changes to care plan, if any, as noted. Otherwise, continue current plan of care.      The above has been created using voice recognition software. Please be aware that this may unintentionally  produce inaccuracies and/or nonsensical sentences.      Electronically signed by: Niall Brown CNP
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
## 2021-06-20 NOTE — PROGRESS NOTES
[facility]    DATE: 2018    NAME:  Derrick Silva             :  3/7/1932  MRN: [id number]  CODE STATUS:  DNR    FACILITY:  [facility] [[id number]]       ROOM:   [id number]    CHIEF COMPLAINT/REASON FOR VISIT:  Chief Complaint   Patient presents with     Problem Visit     Dyspnea on exertion     HISTORY OF PRESENT ILLNESS: Derrick Silva is a 86 y.o. male with hx of CAD, dementia, urine retention, constipation presents with ADAMSON and was found to have some EKG changes      1.ADAMSON  -at risk for CAD  -CTA neg pe  -tele  -trops-neg  -did ask cards to see  -appreciate recs with med adjustment  -Per he and his son he appears back to his baseline      2.HTN uncontrolled-readings are in the 170s-180s this morning but prior to that have been low, continue to monitor at TCU, no changes for now given the risk of hypotension and the fact that M Mitzi has been started      3.hx of severe CAD  -Cards did see and added Imdur  -monitor how he does      4.hx of constipation-improved with bowel regimen  -start on senna/Colace  -A lot of stool seen on CT  -repeated dulcolax supp after CT  -gave lactulose -now think he is cleaned out, keep on senna/colace,      5.Urine retention-he passed voiding trial and Lin has been removed since yesterday.  -Restart Flomax      6.Renal cyst  -on CT   -concerning -will asked Urology to see here, they did  -family now has agreed that they DO NOT Want to work this up and understand 1.8 nodule may be malignany      7.Elevated LFT's  -trend, better  -u/s did not show clear issues  -in past evidence chronic pancreatitis  -checked lipase too      8.Acute enceph  -with adamson, with severe constipation, urine retention, and dementia at risk  -seems better today  -PT/OT eval-TCU recommended      9.hx for DM  -not on meds  -last a1c 6.9  -SS here, bs up , likely from stress      10.AIDEE  -concern poor po intake, also creat went up 2 days after CTA in ER , could  have a bit of dye contributing here  -also got cta on admit  -ivf with NS, wean today  -creat better    Today, patient is seen at the bedside. He is very pleasant. He is on Imdur for CAD. He has diabetes mellitus type 2 which is diet controlled.  His last A1c was 6.9.  His constipation issues have resolved.  He is on Senna S.  He has BPH/urinary retention an dis on Tamsulosin.    Past Medical History:   Diagnosis Date     Aortic stenosis      Arthritis      Cancer (H)     prostate     Chronic pain      Constipation      Depression      Diabetes mellitus (H)      Diabetes mellitus, type II (H)      Eczema      Encephalopathy acute 7/14/2018     GERD (gastroesophageal reflux disease)      Heart disease      Hyperlipidemia      Hypertension      Osteoarthritis      Pacemaker      Past Surgical History:   Procedure Laterality Date     CARDIAC PACEMAKER PLACEMENT       CARPAL TUNNEL RELEASE  2014     HEMORRHOID SURGERY       Family History   Problem Relation Age of Onset     No Medical Problems Mother      No Medical Problems Father      Parkinsonism Daughter      Social History     Social History     Marital status:      Spouse name: N/A     Number of children: 7     Years of education: 8     Occupational History     Not on file.     Social History Main Topics     Smoking status: Never Smoker     Smokeless tobacco: Never Used     Alcohol use No     Drug use: No     Sexual activity: Not on file     Other Topics Concern     Not on file     Social History Narrative     No Known Allergies  Current Outpatient Prescriptions   Medication Sig Dispense Refill     acetaminophen (MAPAP EXTRA STRENGTH) 500 MG tablet Take 2 tablets (1,000 mg total) by mouth 3 (three) times a day. -Not to exceed 4000 Mg APAP/24Hrs 100 tablet 10     amLODIPine (NORVASC) 5 MG tablet Take 1 tablet (5 mg total) by mouth daily. 90 tablet 1     aspirin 81 mg chewable tablet 1 TAB BY MOUTH DAILY (8PM) 90 tablet 10     famotidine (PEPCID) 20 MG tablet  Take 1 tablet (20 mg total) by mouth daily. 90 tablet 3     isosorbide mononitrate (IMDUR) 30 MG 24 hr tablet Take 1 tablet (30 mg total) by mouth daily. 30 tablet 0     lidocaine (ASPERCREME, LIDOCAINE,) 4 % patch Place 2 patches on the skin daily. Remove and discard patch with 12 hours or as directed by MD.       losartan (COZAAR) 50 MG tablet Take 1 tablet (50 mg total) by mouth daily. 90 tablet 3     melatonin 3 mg Tab tablet Take 1 tablet (3 mg total) by mouth at bedtime. 90 tablet 1     menthol-zinc oxide (CALMOSEPTINE) 0.44-20.6 % Oint ointment Apply to buttock 4 times daily as needed for skin irritation  0     metoprolol succinate (TOPROL-XL) 50 MG 24 hr tablet Take 25 mg by mouth at bedtime.       metoprolol succinate (TOPROL-XL) 50 MG 24 hr tablet Take 50 mg by mouth every morning.       nitroglycerin (NITROSTAT) 0.4 MG SL tablet DISSOLVE 1 TAB UNDER TONGUE EVERY 5 MINUTES UP TO 3 DOSES AS NEEDED FOR CHEST PAIN 100 tablet 10     OLANZapine zydis (ZYPREXA) 5 MG disintegrating tablet Take 1 tablet (5 mg total) by mouth at bedtime. 90 tablet 1     oxyCODONE (ROXICODONE) 5 MG immediate release tablet Take 1 tablet (5 mg total) by mouth 2 (two) times a day as needed. 15 tablet 0     rivastigmine (EXELON) 4.6 mg/24 hr Place 1 patch on the skin daily. 30 patch 6     senna-docusate (PERICOLACE) 8.6-50 mg tablet Take 1 tablet by mouth 2 (two) times a day.  0     sertraline (ZOLOFT) 100 MG tablet 1 TAB BY MOUTH EVERY MORNING 30 tablet 10     tamsulosin (FLOMAX) 0.4 mg Cp24 Take 1 capsule (0.4 mg total) by mouth daily. 90 capsule 3     traZODone (DESYREL) 50 MG tablet Take 0.5 tablets (25 mg total) by mouth at bedtime. 15 tablet 0     No current facility-administered medications for this visit.        REVIEW OF SYSTEMS:    Currently, no fever, chills, or rigors. Does not have any visual or hearing problems. Denies any chest pain, headaches, palpitations, lightheadedness, dizziness, shortness of breath, or cough.  Appetite is good. Denies any GERD symptoms. Denies any difficulty with swallowing, nausea, or vomiting.  Denies any abdominal pain, diarrhea or constipation. Denies any urinary symptoms. No insomnia. No active bleeding. No rash.       PHYSICAL EXAMINATION:  Vitals:    09/30/18 2208   BP: 126/72   Pulse: 72   Resp: 18   Temp: 97  F (36.1  C)   SpO2: 96%   Weight: 199 lb (90.3 kg)       GENERAL: Awake, Alert, oriented x3, not in any form of acute distress, answers questions appropriately, follows simple commands, conversant  HEENT: Head is normocephalic with normal hair distribution. No evidence of trauma. Ears: No acute purulent discharge. Eyes: Conjunctivae pink with no scleral jaundice. Nose: Normal mucosa and septum. NECK: Supple with no cervical or supraclavicular lymphadenopathy. Trachea is midline.   CHEST: No tenderness or deformity, no crepitus  LUNG: Clear to auscultation with good chest expansion. There are no crackles or wheezes, normal AP diameter.  BACK: No kyphosis of the thoracic spine. Symmetric, no curvature, ROM normal, no CVA tenderness, no spinal tenderness   CVS: There is good S1  S2, there are no murmurs, rubs, gallops, or heaves, rhythm is regular,  2+ pulses symmetric in all extremities.  ABDOMEN: Globular and soft, nontender to palpation, non distended, no masses, no organomegaly, good bowel sounds, no rebound or guarding, no peritoneal signs.   EXTREMITIES:  Full range of motion on both upper and lower extremities, there is no tenderness to palpation, no pedal edema, no cyanosis or clubbing, no calf tenderness.  Pulses equal in all extremities, normal cap refill, no joint swelling.  SKIN: Warm and dry, no erythema noted.  Skin color, texture, no rashes or lesions.  NEUROLOGICAL: The patient is oriented to person, place and time. Strength and sensation are grossly intact. Face is symmetric.    LABS:      Lab Results   Component Value Date    WBC 7.5 09/23/2018    HGB 12.6 (L) 09/23/2018     HCT 38.4 (L) 09/23/2018    MCV 97 09/23/2018     09/23/2018     Results for orders placed or performed during the hospital encounter of 09/19/18   Basic Metabolic Panel   Result Value Ref Range    Sodium 138 136 - 145 mmol/L    Potassium 3.8 3.5 - 5.0 mmol/L    Chloride 110 (H) 98 - 107 mmol/L    CO2 19 (L) 22 - 31 mmol/L    Anion Gap, Calculation 9 5 - 18 mmol/L    Glucose 154 (H) 70 - 125 mg/dL    Calcium 9.0 8.5 - 10.5 mg/dL    BUN 31 (H) 8 - 28 mg/dL    Creatinine 1.24 0.70 - 1.30 mg/dL    GFR MDRD Af Amer >60 >60 mL/min/1.73m2    GFR MDRD Non Af Amer 55 (L) >60 mL/min/1.73m2         Lab Results   Component Value Date    HGBA1C 6.9 (H) 07/26/2018     No results found for: [id number]  Lab Results   Component Value Date    [id number] 326 07/15/2018     ASSESSMENT/PLAN:    1. Shortness of breath - Resolved   2. Coronary artery disease of native artery of native heart with stable angina pectoris (H) - Started on Imdur   3. Benign prostatic hyperplasia with urinary retention - Continue Tamsulosin   4. Controlled type 2 diabetes mellitus without complication, without long-term current use of insulin (H) - Diet controlled   5. Late onset Alzheimer's disease without behavioral disturbance - Neurologically stable         Electronically signed by:  Spring Kerr CNP    35 minutes TT of which 50% was spent in counseling and coordination of care of the above plan.    Time spent in interview and examination of patient, review of available records, and discussion with nursing staff. Continue care plan, efforts at therapy, and monitor nutritional status.

## 2021-06-20 NOTE — PROGRESS NOTES
[facility]    Name:   Derrick Silva  : 3/7/1932  Facility:   [facility] [[id number]]   Room:   Code Status: DNR -   Fac type:   SNF (Skilled Nursing Facility, TCU) -     CHIEF COMPLAINT / REASON FOR VISIT:  Chief Complaint   Patient presents with     Review Of Multiple Medical Conditions     TCU follow-up after hospitalization for dyspnea, acute encephalopathy, uncontrolled hypertension, and severe coronary artery disease.     [facility] from 18 until 18    Patient was last seen by me on 10/02/18.    HPI: Derrick is a 86 y.o. 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 Pedrito Aguila.  He presented to the ER on 18 from his care center with shortness of breath.  According to the EKG, he did have some new T-wave inversions, and it was complicated by a left bundle branch block.  He, himself, described worsening tremors, and his labs did show elevated LFTs.  His son stated that he sometimes got worsening tremors and dyspnea when he was agitated.  There was no nausea or vomiting, right upper quadrant or abdominal pain.  Initially, he was to remain for observation overnight.    Chest CT revealed patchy opacities in the lungs, likely a combination of motion and atelectasis.  There was no consolidation.  There was mild cardiomegaly/right heart enlargement and severe coronary calcifications.  CT also identified a renal cyst, although family did not want a workup despite understanding that the 1.8 cm nodule could be malignant.    His LFTs were elevated, but did improve.    For his severe CAD, he did see cardiology, and Imdur was added.      He also had significant constipation (a good deal of stool seen on CT) and was started on senna S.  He was also given bisacodyl suppositories and lactulose.    He has BPH/urinary retention he did pass a voiding trial, and the Lin catheter was discontinued with  "tamsulosin restarted.    He has diabetes mellitus type 2 which has been diet controlled.  His last A1c was 6.9.  While in the hospital, he was on sliding scale insulin.    He is on quetiapine 12.5 mg every 6 hours as needed, apparently experiencing hallucinations due to acute encephalopathy.        CURRENT ISSUES    We did eventually discontinue his quetiapine on 09/28/18.  However, he is still on olanzapine (5 mg nightly), and we are going to discontinue that.    Nursing staff report that he is rehabbing well, although he is not yet back to baseline.  He is still somewhat slow to respond.  His family would like him to return to assisted living with his wife.    ROS: He is confused but seems able to respond adequately to a review of systems.  Previously telling me he \"got winded down in exercises,\" he seems to be doing much better but is still having problems with shortness of breath.  He continues to have minimal low back pain for which he receives a Lidoderm patch.  He denies headaches or chest pain.  Today, he denies any dizziness.  Sleeping is okay.  Bowels are not perfect but moving.  He does admit to urinary incontinence, and possibly even some fecal incontinence.    Past Medical History:   Diagnosis Date     Aortic stenosis      Arthritis      Cancer (H)     prostate     Chronic pain      Constipation      Depression      Diabetes mellitus (H)      Diabetes mellitus, type II (H)      Eczema      Encephalopathy acute 7/14/2018     GERD (gastroesophageal reflux disease)      Heart disease      Hyperlipidemia      Hypertension      Osteoarthritis      Pacemaker               Family History   Problem Relation Age of Onset     No Medical Problems Mother      No Medical Problems Father      Parkinsonism Daughter      Social History     Social History     Marital status:      Spouse name: N/A     Number of children: 7     Years of education: 8     Social History Main Topics     Smoking status: Never Smoker "     Smokeless tobacco: Never Used     Alcohol use No     Drug use: No     Sexual activity: Not on file     Other Topics Concern     Not on file     Social History Narrative       MEDICATIONS: Reviewed from the MAR, physician orders, and/or earlier progress notes.  Updated by me today (10/09/18) with discontinuation of olanzapine reflected below.  Current Outpatient Prescriptions   Medication Sig     acetaminophen (MAPAP EXTRA STRENGTH) 500 MG tablet Take 2 tablets (1,000 mg total) by mouth 3 (three) times a day. -Not to exceed 4000 Mg APAP/24Hrs     amLODIPine (NORVASC) 5 MG tablet Take 1 tablet (5 mg total) by mouth daily.     aspirin 81 mg chewable tablet 1 TAB BY MOUTH DAILY (8PM)     famotidine (PEPCID) 20 MG tablet Take 1 tablet (20 mg total) by mouth daily.     isosorbide mononitrate (IMDUR) 30 MG 24 hr tablet Take 1 tablet (30 mg total) by mouth daily.     lidocaine (ASPERCREME, LIDOCAINE,) 4 % patch Place 2 patches on the skin daily. Remove and discard patch with 12 hours or as directed by MD.     losartan (COZAAR) 50 MG tablet Take 1 tablet (50 mg total) by mouth daily.     melatonin 3 mg Tab tablet Take 1 tablet (3 mg total) by mouth at bedtime.     menthol-zinc oxide (CALMOSEPTINE) 0.44-20.6 % Oint ointment Apply to buttock 4 times daily as needed for skin irritation     metoprolol succinate (TOPROL-XL) 50 MG 24 hr tablet Take 25 mg by mouth at bedtime.     metoprolol succinate (TOPROL-XL) 50 MG 24 hr tablet Take 50 mg by mouth every morning.     nitroglycerin (NITROSTAT) 0.4 MG SL tablet DISSOLVE 1 TAB UNDER TONGUE EVERY 5 MINUTES UP TO 3 DOSES AS NEEDED FOR CHEST PAIN     oxyCODONE (ROXICODONE) 5 MG immediate release tablet Take 1 tablet (5 mg total) by mouth 2 (two) times a day as needed.     rivastigmine (EXELON) 4.6 mg/24 hr Place 1 patch on the skin daily.     senna-docusate (PERICOLACE) 8.6-50 mg tablet Take 1 tablet by mouth 2 (two) times a day.     sertraline (ZOLOFT) 100 MG tablet 1 TAB BY MOUTH  "EVERY MORNING     tamsulosin (FLOMAX) 0.4 mg Cp24 Take 1 capsule (0.4 mg total) by mouth daily.     traZODone (DESYREL) 50 MG tablet Take 0.5 tablets (25 mg total) by mouth at bedtime.     ALLERGIES: No Known Allergies    DIET: Regular, regular texture, thin liquids    Vitals:    10/09/18 1437   BP: 125/63   Pulse: 74   Resp: 20   Temp: 96.9  F (36.1  C)   SpO2: 98%   Weight: 198 lb 3.2 oz (89.9 kg)   Height: 5' 7\" (1.702 m)   Continue to monitor elevated blood pressures.  Body mass index is 31.04 kg/(m^2).    EXAMINATION:   General: Pleasant, obese elderly male, up ambulating independently with a 2 wheeled walker, in no apparent distress.  He does not know why he is here, but he is able to direct me to the correct room.  He does look a bit winded once we arrive.  Head: Normocephalic and atraumatic.   Eyes: PERRLA, sclerae clear.   ENT: Moist oral mucosa.  He has his own teeth, some broken on top.  No rhinorrhea or nasal discharge.  Hearing may be mildly impaired.  Cardiovascular: Regular rate and rhythm with a 2/6 systolic ejection murmur at the left MCL.   Respiratory: Lungs clear to auscultation bilaterally.   Abdomen: Obese but soft and nontender.   Musculoskeletal/Extremities: Age-related degenerative joint disease.  No peripheral edema.  Neurologic: Slight upper extremity tremor noted, particularly on the right.  Integument: No rashes, clinically significant lesions, or skin breakdown.   Cognitive/Psychiatric: Cognitive deficits are rather obvious.  When asked the year, he tells me 2013.  When asked the month, he tells me October.    DIAGNOSTICS:   Results for orders placed or performed during the hospital encounter of 09/19/18   Basic Metabolic Panel   Result Value Ref Range    Sodium 138 136 - 145 mmol/L    Potassium 3.8 3.5 - 5.0 mmol/L    Chloride 110 (H) 98 - 107 mmol/L    CO2 19 (L) 22 - 31 mmol/L    Anion Gap, Calculation 9 5 - 18 mmol/L    Glucose 154 (H) 70 - 125 mg/dL    Calcium 9.0 8.5 - 10.5 mg/dL "    BUN 31 (H) 8 - 28 mg/dL    Creatinine 1.24 0.70 - 1.30 mg/dL    GFR MDRD Af Amer >60 >60 mL/min/1.73m2    GFR MDRD Non Af Amer 55 (L) >60 mL/min/1.73m2     Lab Results   Component Value Date    WBC 7.5 09/23/2018    HGB 12.6 (L) 09/23/2018    HCT 38.4 (L) 09/23/2018    MCV 97 09/23/2018     09/23/2018     CrCl cannot be calculated (Patient's most recent sCr result is older than the maximum 5 days allowed.).  Lab Results   Component Value Date    HGBA1C 6.9 (H) 07/26/2018     Lab Results   Component Value Date    ALT 56 (H) 09/24/2018    AST 34 09/24/2018    ALKPHOS 256 (H) 09/24/2018    BILITOT 0.5 09/24/2018       ASSESSMENT/Plan:      ICD-10-CM    1. SOB (shortness of breath) R06.02    2. Late onset Alzheimer's disease without behavioral disturbance G30.1     F02.80    3. Coronary artery disease of native artery of native heart with stable angina pectoris (H) I25.118    4. Benign prostatic hyperplasia with urinary retention N40.1     R33.8    5. Controlled type 2 diabetes mellitus without complication, without long-term current use of insulin (H) E11.9    6. Constipation, unspecified constipation type K59.00      CHANGES:    Discontinue olanzapine.    CARE PLAN:    The care plan has been reviewed and all orders signed. Changes to care plan, if any, as noted. Otherwise, continue current plan of care.      The above has been created using voice recognition software. Please be aware that this may unintentionally  produce inaccuracies and/or nonsensical sentences.      Electronically signed by: Niall Brown, SIERRA

## 2021-06-20 NOTE — PROGRESS NOTES
Persons accompanying you (the patient) today: Wife    How have you been doing since we saw you last? Please note any concerns.  Pt states he has not been good.    Please list any recent hospitalizations/surgeries/procedures you've had since we saw you last:  None    Have you had any falls since your last visit? No    Do you have any pain today? Yes:

## 2021-06-20 NOTE — PROGRESS NOTES
Assessment / Impression   1.  Dementia with Lewy bodies  2.  Diarrhea likely multifactorial etiology  3.  Other medical problems as noted      Plan:   1.  Continue transdermal rivastigmine  2.  Discontinue Colace, senna and MiraLAX  3.  Staff are to notify me should the patient's diarrhea fail to resolve.  In such a case, I would discontinue transdermal rivastigmine until diarrhea resolved and then attempt treatment with either donepezil or galantamine    Long conversation held with the patient and daughter Alissa who is visiting from [facility].  The patient is clearly improved and therefore will continue present therapies as noted above.    Total time for evaluation 25 minutes with majority time spent counseling.  Follow-up in 4 weeks.      Subjective:     HPI: Derrick Silva is a 86 y.o. male with above-noted diagnoses who is seen in follow-up.  The patient had been tolerating transdermal rivastigmine well but last night had frequent diarrhea.  He has not had a bowel movement since arising from bed this morning.  The patient reports auditory visual hallucinations to have resolved on transdermal rivastigmine.  Otherwise he also has experienced some improvement in general sense of well-being.  The patient's daughter Alissa is uncertain as to whether she is noted improvements but she has limited contact with her father.          Review of Systems:          As above        Objective:     Vitals:    09/11/18 0942 09/11/18 0943 09/11/18 0944   BP: 164/72 161/74 156/84   Pulse: 75 86 84       No results found for this or any previous visit (from the past 24 hour(s)).    Physical Exam: Patient is neatly groomed casually dressed and seated for evaluation.  No evidence of overt confusion.  Evidence of parkinsonism again noted.  The patient's affect is pleasant and mood congruent.

## 2021-06-20 NOTE — PROGRESS NOTES
[facility]      Facility:    [facility] [[id number]]  Code Status: DNR       Chief Complaint/Reason for Visit:  Chief Complaint   Patient presents with     H & P     New admit to TCU-dyspnea, CAD, urine retention.        HPI:   Derrick is a 86 y.o. male who presented to the ED on 9/19/18 due to shortness of breath. EKG was abnormal, LFTs elevated. He was admitted for further care and work up, discharge summary partially excerpted below.     87 y/o man with hx of CAD, dementia, urine retention, constipation presents with ADAMSON and was found to have some EKG changes      1.ADAMSON  -at risk for CAD  -CTA neg pe  -tele  -trops-neg  -did ask cards to see  -appreciate recs with med adjustment  -Per he and his son he appears back to his baseline      2.HTN uncontrolled-readings are in the 170s-180s this morning but prior to that have been low, continue to monitor at TCU, no changes for now given the risk of hypotension and the fact that M Mitzi has been started      3.hx of severe CAD  -Cards did see and added Imdur  -monitor how he does      4.hx of constipation-improved with bowel regimen  -start on senna/Colace  -A lot of stool seen on CT  -repeated dulcolax supp after CT  -gave lactulose -now think he is cleaned out, keep on senna/colace,      5.Urine retention-he passed voiding trial and Lin has been removed since yesterday.  -Restart Flomax      6.Renal cyst  -on CT   -concerning -will asked Urology to see here, they did  -family now has agreed that they DO NOT Want to work this up and understand 1.8 nodule may be malignancy      7.Elevated LFT's  -trend, better  -u/s did not show clear issues  -in past evidence chronic pancreatitis  -checked lipase too      8.Acute enceph  -with adamson, with severe constipation, urine retention, and dementia at risk  -seems better today  -PT/OT eval-TCU recommended    3  9.hx for DM  -not on meds  -last a1c 6.9  -SS here, bs up , likely from  stress      10.AIDEE  -concern poor po intake, also creat went up 2 days after CTA in ER , could have a bit of dye contributing here  -also got cta on admit  -ivf with NS, wean today  -creat better      Overall stabilized and discharged to TCU on 9/24/18 for PT, OT, nursing cares, medical management and monitoring.     Today:  He is seen in his room, resting comfortably. No complaints. He denies shortness of breath or chest pain. No nausea, vomiting or diarrhea. He does not have alfred, states no difficulty urinating. Wants to go home soon. Is working with therapy. No fever or cough. Appetite is pretty good per his report. No new vision or hearing concerns.       Past Medical History:  Past Medical History:   Diagnosis Date     Aortic stenosis      Arthritis      Cancer (H)     prostate     Chronic pain      Constipation      Depression      Diabetes mellitus (H)      Diabetes mellitus, type II (H)      Eczema      Encephalopathy acute 7/14/2018     GERD (gastroesophageal reflux disease)      Heart disease      Hyperlipidemia      Hypertension      Osteoarthritis      Pacemaker            Surgical History:  Past Surgical History:   Procedure Laterality Date     CARDIAC PACEMAKER PLACEMENT       CARPAL TUNNEL RELEASE  2014     HEMORRHOID SURGERY         Family History:   Family History   Problem Relation Age of Onset     No Medical Problems Mother      No Medical Problems Father      Parkinsonism Daughter        Social History:    Social History     Social History     Marital status:      Spouse name: N/A     Number of children: 7     Years of education: 8     Social History Main Topics     Smoking status: Never Smoker     Smokeless tobacco: Never Used     Alcohol use No     Drug use: No     Sexual activity: Not on file     Other Topics Concern     Not on file     Social History Narrative        Medications:  Current Outpatient Prescriptions   Medication Sig Note     acetaminophen (MAPAP EXTRA STRENGTH) 500 MG  tablet Take 2 tablets (1,000 mg total) by mouth 3 (three) times a day. -Not to exceed 4000 Mg APAP/24Hrs 9/19/2018: 08-12-20     amLODIPine (NORVASC) 5 MG tablet Take 1 tablet (5 mg total) by mouth daily. 9/19/2018: Takes in the evening     aspirin 81 mg chewable tablet 1 TAB BY MOUTH DAILY (8PM)      famotidine (PEPCID) 20 MG tablet Take 1 tablet (20 mg total) by mouth daily.      isosorbide mononitrate (IMDUR) 30 MG 24 hr tablet Take 1 tablet (30 mg total) by mouth daily.      lidocaine (ASPERCREME, LIDOCAINE,) 4 % patch Place 2 patches on the skin daily. Remove and discard patch with 12 hours or as directed by MD.      losartan (COZAAR) 50 MG tablet Take 1 tablet (50 mg total) by mouth daily.      melatonin 3 mg Tab tablet Take 1 tablet (3 mg total) by mouth at bedtime.      menthol-zinc oxide (CALMOSEPTINE) 0.44-20.6 % Oint ointment Apply to buttock 4 times daily as needed for skin irritation      metoprolol succinate (TOPROL-XL) 50 MG 24 hr tablet Take 25 mg by mouth at bedtime.      metoprolol succinate (TOPROL-XL) 50 MG 24 hr tablet Take 50 mg by mouth every morning.      nitroglycerin (NITROSTAT) 0.4 MG SL tablet DISSOLVE 1 TAB UNDER TONGUE EVERY 5 MINUTES UP TO 3 DOSES AS NEEDED FOR CHEST PAIN      OLANZapine zydis (ZYPREXA) 5 MG disintegrating tablet Take 1 tablet (5 mg total) by mouth at bedtime.      oxyCODONE (ROXICODONE) 5 MG immediate release tablet Take 1 tablet (5 mg total) by mouth 2 (two) times a day as needed.      rivastigmine (EXELON) 4.6 mg/24 hr Place 1 patch on the skin daily.      senna-docusate (PERICOLACE) 8.6-50 mg tablet Take 1 tablet by mouth 2 (two) times a day.      sertraline (ZOLOFT) 100 MG tablet 1 TAB BY MOUTH EVERY MORNING      tamsulosin (FLOMAX) 0.4 mg Cp24 Take 1 capsule (0.4 mg total) by mouth daily. 9/19/2018: Takes in the evening     traZODone (DESYREL) 50 MG tablet Take 0.5 tablets (25 mg total) by mouth at bedtime.        Allergies:  No Known Allergies      Review of  Systems:  Pertinent items are noted in HPI.      Physical Exam:   General: Patient is alert elderly male, no distress.   Vitals: /82, Temp 96.9, Pulse 67, RR 18, O2 sat 96% RA. Wt 198 lbs.  HEENT: Head is NCAT. Eyes show no injection or icterus. Nares negative. Oropharynx well hydrated.  Neck: Supple. No tenderness or adenopathy. No JVD.  Lungs: Clear bilaterally. No wheezes.  Cardiovascular: Regular rate and rhythm, normal S1. S2.  Back: No spinal or CVA tenderness.  Abdomen: Soft, no tenderness on exam. Bowel sounds present. No guarding rebound or rigidity.  : Deferred.  Extremities: No edema is noted.  Musculoskeletal: Age related degen changes.   Skin: Warm and dry.   Psych: Mood appears good.      Labs:  Results for orders placed or performed during the hospital encounter of 09/19/18   Basic Metabolic Panel   Result Value Ref Range    Sodium 138 136 - 145 mmol/L    Potassium 3.8 3.5 - 5.0 mmol/L    Chloride 110 (H) 98 - 107 mmol/L    CO2 19 (L) 22 - 31 mmol/L    Anion Gap, Calculation 9 5 - 18 mmol/L    Glucose 154 (H) 70 - 125 mg/dL    Calcium 9.0 8.5 - 10.5 mg/dL    BUN 31 (H) 8 - 28 mg/dL    Creatinine 1.24 0.70 - 1.30 mg/dL    GFR MDRD Af Amer >60 >60 mL/min/1.73m2    GFR MDRD Non Af Amer 55 (L) >60 mL/min/1.73m2       Lab Results   Component Value Date    WBC 7.5 09/23/2018    HGB 12.6 (L) 09/23/2018    HCT 38.4 (L) 09/23/2018    MCV 97 09/23/2018     09/23/2018         Assessment/Plan:  1. CAD. Dyspnea. Continue Imdur (added this hospitalization), aspirin. Follow up with cardiology.  2. DM. Controlled with diet.   3. HTN. Continue Amlodipine, Losartan. Monitor BPs.  4. Urinary retention. On Tamsulosin. No alfred currently.  5. Dementia. Alzheimers. On Exelon and Zyprexa. Sertraline for depression. Resides at [facility].   6. Anemia. Mild.   7. Renal insufficiency. AIDEE, improved. Renal cyst concerning for malignancy. Follow up with urology after TCU if family desires.  8. Code status is  DNR.      Total time greater than 50 minutes, greater than 50% counseling and coordination of care, time spent in interview and examination of patient, review of records, discussion with nursing staff.        Electronically signed by: Stephanie Moyer MD

## 2021-06-20 NOTE — PROGRESS NOTES
[facility]    Name:   Derrick Silva  : 3/7/1932  Facility:   [facility] [[id number]]   Room:   Code Status: DNR -   Fac type:   SNF (Skilled Nursing Facility, TCU) -     CHIEF COMPLAINT / REASON FOR VISIT:  Chief Complaint   Patient presents with     Review Of Multiple Medical Conditions     TCU follow-up for dyspnea, acute encephalopathy, uncontrolled hypertension and severe coronary artery disease.     [facility] from 18 until     HPI: Derrick is a 86 y.o. 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 Pedrito Aguila.  He presented to the ER on 18 from his care center with shortness of breath.  According to the EKG, he did have some new T-wave inversions, and it was complicated by a left bundle branch block.  He, himself, described worsening tremors, and his labs did show elevated LFTs.  His son stated that he sometimes got worsening tremors and dyspnea when he was agitated.  There was no nausea or vomiting, right upper quadrant or abdominal pain.  Initially, he was to remain for observation overnight.    Chest CT revealed patchy opacities in the lungs, likely a combination of motion and atelectasis.  There was no consolidation.  There was mild cardiomegaly/right heart enlargement and severe coronary calcifications.  CT also identified a renal cyst, although family did not want a workup despite understanding that the 1.8 cm nodule could be malignant.    His LFTs were elevated, but did improve.    For his severe CAD, he did see cardiology, and Imdur was added.      He also had significant constipation (a good deal of stool seen on CT) and was started on senna S.  He was also given bisacodyl suppositories and lactulose.    He has BPH/urinary retention he did pass a voiding trial, and the Lin catheter was discontinued with tamsulosin restarted.    He has diabetes mellitus type 2 which has  "been diet controlled.  His last A1c was 6.9.  While in the hospital, he was on sliding scale insulin.    He is on quetiapine 12.5 mg every 6 hours as needed, apparently experiencing hallucinations due to acute encephalopathy.        CURRENT ISSUES    I suspect we will eventually discontinue his quetiapine.    ROS: He is confused but seems able to respond adequately to a review of systems.  He looks winded and tells me he \"got winded down in exercises.\"  He tells me, \"I told them I couldn't do no more.\"  Asked about pain, he explained, \"I don't know what I got.\"  Eventually, he tells me about low back pain for which she receives a Lidoderm patch.  He denies headaches or chest pain.  He does have dizziness \"sometimes.\"  Sleeping is okay.  Asked about his bowels, he tells me that today are \"not at their best right now.\"  He does admit to urinary incontinence, and possibly even some fecal incontinence.    Past Medical History:   Diagnosis Date     Aortic stenosis      Arthritis      Cancer (H)     prostate     Chronic pain      Constipation      Depression      Diabetes mellitus (H)      Diabetes mellitus, type II (H)      Eczema      Encephalopathy acute 7/14/2018     GERD (gastroesophageal reflux disease)      Heart disease      Hyperlipidemia      Hypertension      Osteoarthritis      Pacemaker               Family History   Problem Relation Age of Onset     No Medical Problems Mother      No Medical Problems Father      Parkinsonism Daughter      Social History     Social History     Marital status:      Spouse name: N/A     Number of children: 7     Years of education: 8     Social History Main Topics     Smoking status: Never Smoker     Smokeless tobacco: Never Used     Alcohol use No     Drug use: No     Sexual activity: Not on file     Other Topics Concern     Not on file     Social History Narrative       MEDICATIONS: Reviewed from the MAR, physician orders, and/or earlier progress notes.  Current " Outpatient Prescriptions   Medication Sig     acetaminophen (MAPAP EXTRA STRENGTH) 500 MG tablet Take 2 tablets (1,000 mg total) by mouth 3 (three) times a day. -Not to exceed 4000 Mg APAP/24Hrs     amLODIPine (NORVASC) 5 MG tablet Take 1 tablet (5 mg total) by mouth daily.     aspirin 81 mg chewable tablet 1 TAB BY MOUTH DAILY (8PM)     famotidine (PEPCID) 20 MG tablet Take 1 tablet (20 mg total) by mouth daily.     isosorbide mononitrate (IMDUR) 30 MG 24 hr tablet Take 1 tablet (30 mg total) by mouth daily.     lidocaine (ASPERCREME, LIDOCAINE,) 4 % patch Place 2 patches on the skin daily. Remove and discard patch with 12 hours or as directed by MD.     losartan (COZAAR) 50 MG tablet Take 1 tablet (50 mg total) by mouth daily.     melatonin 3 mg Tab tablet Take 1 tablet (3 mg total) by mouth at bedtime.     menthol-zinc oxide (CALMOSEPTINE) 0.44-20.6 % Oint ointment Apply to buttock 4 times daily as needed for skin irritation     metoprolol succinate (TOPROL-XL) 50 MG 24 hr tablet Take 25 mg by mouth at bedtime.     metoprolol succinate (TOPROL-XL) 50 MG 24 hr tablet Take 50 mg by mouth every morning.     nitroglycerin (NITROSTAT) 0.4 MG SL tablet DISSOLVE 1 TAB UNDER TONGUE EVERY 5 MINUTES UP TO 3 DOSES AS NEEDED FOR CHEST PAIN     OLANZapine zydis (ZYPREXA) 5 MG disintegrating tablet Take 1 tablet (5 mg total) by mouth at bedtime.     oxyCODONE (ROXICODONE) 5 MG immediate release tablet Take 1 tablet (5 mg total) by mouth 2 (two) times a day as needed.     QUEtiapine (SEROQUEL) 25 MG tablet Take 0.5 tablets (12.5 mg total) by mouth every 6 (six) hours as needed (Agitation that is not redirectable.).     rivastigmine (EXELON) 4.6 mg/24 hr Place 1 patch on the skin daily.     senna-docusate (PERICOLACE) 8.6-50 mg tablet Take 1 tablet by mouth 2 (two) times a day.     sertraline (ZOLOFT) 100 MG tablet 1 TAB BY MOUTH EVERY MORNING     tamsulosin (FLOMAX) 0.4 mg Cp24 Take 1 capsule (0.4 mg total) by mouth daily.      "traZODone (DESYREL) 50 MG tablet Take 0.5 tablets (25 mg total) by mouth at bedtime.     ALLERGIES: No Known Allergies    DIET: Regular, regular texture, thin liquids    Vitals:    09/25/18 1712   BP: 165/82   Pulse: 78   Resp: 18   Temp: 97.3  F (36.3  C)   SpO2: 97%   Weight: 205 lb (93 kg)   Height: 5' 7\" (1.702 m)   Continue to monitor elevated blood pressures.  Body mass index is 32.11 kg/(m^2).    EXAMINATION:   General: Pleasant, obese elderly male, lying in bed, in no apparent distress.  Head: Normocephalic and atraumatic.   Eyes: PERRLA, sclerae clear.   ENT: Moist oral mucosa.  He has his own teeth.  No rhinorrhea or nasal discharge.  Hearing may be mildly impaired.  Cardiovascular: Regular rate and rhythm with a 2/6 pansystolic murmur at the left MCL.   Respiratory: Lungs clear to auscultation bilaterally.   Abdomen: Obese but soft and nontender.   Musculoskeletal/Extremities: Age-related degenerative joint disease.  No peripheral edema.  Integument: No rashes, clinically significant lesions, or skin breakdown.   Cognitive/Psychiatric: Cognitive deficits are rather obvious.  When asked the year, he tells me 2013.  When asked the month, he tells me October.    DIAGNOSTICS:   Results for orders placed or performed during the hospital encounter of 09/19/18   Basic Metabolic Panel   Result Value Ref Range    Sodium 138 136 - 145 mmol/L    Potassium 3.8 3.5 - 5.0 mmol/L    Chloride 110 (H) 98 - 107 mmol/L    CO2 19 (L) 22 - 31 mmol/L    Anion Gap, Calculation 9 5 - 18 mmol/L    Glucose 154 (H) 70 - 125 mg/dL    Calcium 9.0 8.5 - 10.5 mg/dL    BUN 31 (H) 8 - 28 mg/dL    Creatinine 1.24 0.70 - 1.30 mg/dL    GFR MDRD Af Amer >60 >60 mL/min/1.73m2    GFR MDRD Non Af Amer 55 (L) >60 mL/min/1.73m2     Lab Results   Component Value Date    WBC 7.5 09/23/2018    HGB 12.6 (L) 09/23/2018    HCT 38.4 (L) 09/23/2018    MCV 97 09/23/2018     09/23/2018     Estimated Creatinine Clearance: 46.9 mL/min (by C-G formula " based on Cr of 1.23).  Lab Results   Component Value Date    HGBA1C 6.9 (H) 07/26/2018     Lab Results   Component Value Date    ALT 56 (H) 09/24/2018    AST 34 09/24/2018    ALKPHOS 256 (H) 09/24/2018    BILITOT 0.5 09/24/2018       ASSESSMENT/Plan:      ICD-10-CM    1. Shortness of breath R06.02    2. Late onset Alzheimer's disease without behavioral disturbance G30.1     F02.80    3. Coronary artery disease of native artery of native heart with stable angina pectoris (H) I25.118    4. Benign prostatic hyperplasia with urinary retention N40.1     R33.8    5. Controlled type 2 diabetes mellitus without complication, without long-term current use of insulin (H) E11.9    6. Constipation, unspecified constipation type K59.00      CHANGES:    None.    CARE PLAN:    The care plan has been reviewed and all orders signed. Changes to care plan, if any, as noted. Otherwise, continue current plan of care.  Time spent with this patient was approximately 35 minutes, with greater than 50% spent in counseling and coordination of care that included a review of his recent hospital discharge summary and medications.    The above has been created using voice recognition software. Please be aware that this may unintentionally  produce inaccuracies and/or nonsensical sentences.      Electronically signed by: Niall Brown CNP

## 2021-06-21 NOTE — PROGRESS NOTES
Assessment / Impression   1.  Dementia with Lewy bodies  2.  Depression    Plan:   1.  Continue present therapies  2.  Wellness programming  3.  Continue present environmental support  4.  Follow-up in 4 months with consideration for up titration of transdermal rivastigmine at that time    A long conversation with the patient and 2 daughters herb and Alissa in attendance.  The patient was admitted to the hospital shortly after being seen by this examiner on 9/11 for treatment of depression and a bowel impaction.  At this point in time he is residing within a memory care unit and things appear to be going well.  Family do note occasional delusions of marital infidelity but otherwise depression appears to be responding well to present therapies.    Total time for this evaluation 30 minutes with majority time spent in counseling with respect to wellness interventions for this patient as well as with respect to medication adjustments that would be anticipated in the future      Subjective:     HPI: Derrick Silva is a 86 y.o. male with above-noted diagnoses who is seen in follow-up.  The patient appears to be feeling fairly well at this point in time.  Shortly after his last visit he was hospitalized for bowel impaction and depression.  Receiving therapy with sertraline significant improvements in the patient's condition have been noted.  He is now residing in a assisted living memory care facility.  I understand his wife has joined him on the campus but is not in the same apartment.          Review of Systems:          No complaints        Objective:   There were no vitals filed for this visit.    No results found for this or any previous visit (from the past 24 hour(s)).    Physical Exam: Patient appears to be in no acute physical distress.  Ample evidence of a parkinsonian syndrome noted.  Hypophonic, I also notes a significant degree of hypomania and hypokinesia.  Mild to moderate cogwheel rigidity in the upper  extremities noted bilaterally.  No tremors are identified.  The patient does ambulate with the aid of a walker.  Cognitively global impairments are noted but I do note reasonable attentional ability at this time.  Affect is pleasant and mood congruent.

## 2021-06-21 NOTE — PROGRESS NOTES
[facility]    Name:   Derrick Silva  : 3/7/1932  Facility:   [facility] [[id number]]   Room:   Code Status: DNR -   Fac type:   SNF (Skilled Nursing Facility, TCU) -     CHIEF COMPLAINT / REASON FOR VISIT:  Chief Complaint   Patient presents with     Discharge Summary     TCU discharge after hospitalization for dyspnea, acute encephalopathy, uncontrolled hypertension, and severe coronary artery disease.     [facility] from 18 until 18  [facility] from 18 until 10/18/18 (expected discharge date)    HPI: Derrick is an 86 y.o. male with late onset Alzheimer's dementia (with behavioral disturbance) who is followed by Dr. Pedrito Aguila.  He presented to the ER on 18 from his care center with shortness of breath.  According to the EKG, he did have some new T-wave inversions, and it was complicated by a left bundle branch block.  He, himself, described worsening tremors, and his labs did show elevated LFTs.  His son stated that he sometimes got worsening tremors and dyspnea when he was agitated.  There was no nausea or vomiting, right upper quadrant or abdominal pain.  Initially, he was to remain for observation overnight.    Chest CT revealed patchy opacities in the lungs, likely a combination of motion and atelectasis.  There was no consolidation.  There was mild cardiomegaly/right heart enlargement and severe coronary calcifications.  CT also identified a renal cyst, although family did not want a workup despite understanding that the 1.8 cm nodule could be malignant.    His LFTs were elevated, but did improve.    For his severe CAD, he did see cardiology, and Imdur was added.      He also had significant constipation (a good deal of stool seen on CT) and was started on senna S.  He was also given bisacodyl suppositories and lactulose.    He has BPH/urinary retention he did pass a voiding trial, and the Lin catheter  "was discontinued with tamsulosin restarted.    He has diabetes mellitus type 2 which has been diet controlled.  His last A1c was 6.9.  While in the hospital, he was on sliding scale insulin.    He has been on quetiapine 12.5 mg every 6 hours as needed, apparently experiencing hallucinations due to acute encephalopathy.  We discontinued that on 09/28/18.      CURRENT ISSUES    Behavioral disturbance: We did eventually discontinue his quetiapine on 09/28/18.  However, he was still on olanzapine (5 mg nightly), and we discontinued that on 10/09/18.  He soon became quite wild and delusional, claiming he had to bring his walker to the house across the street.  He tried several times to leave the building.  We resumed the olanzapine on 10/12/18, and he has been much calmer since.  Some feel that he is a little too quiet.  Some nursing staff report cognitive change, weakness, and trouble walking, although it is not seen by everyone.    He did fall over the weekend, possibly due to his agitation (before olanzapine was resumed), and skinned his right elbow.    ROS: He is confused but seems able to respond adequately to a review of systems.  Previously telling me he \"got winded down in exercises,\" he seems to be doing much better but is still having problems with occasional shortness of breath.  He continues to have minimal low back pain for which he receives a Lidoderm patch.  He has orders for oxycodone, but this is being discontinued.  He denies headaches or chest pain.  Today, he denies any dizziness.  Sleeping is okay.  Bowels are not perfect but moving.  He does admit to urinary incontinence, and possibly even some fecal incontinence.    Past Medical History:   Diagnosis Date     Aortic stenosis      Arthritis      Cancer (H)     prostate     Chronic pain      Constipation      Depression      Diabetes mellitus (H)      Diabetes mellitus, type II (H)      Eczema      Encephalopathy acute 7/14/2018     GERD " (gastroesophageal reflux disease)      Heart disease      Hyperlipidemia      Hypertension      Osteoarthritis      Pacemaker               Family History   Problem Relation Age of Onset     No Medical Problems Mother      No Medical Problems Father      Parkinsonism Daughter      Social History     Social History     Marital status:      Spouse name: N/A     Number of children: 7     Years of education: 8     Social History Main Topics     Smoking status: Never Smoker     Smokeless tobacco: Never Used     Alcohol use No     Drug use: No     Sexual activity: Not on file     Other Topics Concern     Not on file     Social History Narrative       MEDICATIONS: Reviewed from the MAR, physician orders, and/or earlier progress notes.  Updated by me today (10/16/18) with discontinuation of oxycodone reflected below.  Current Outpatient Prescriptions   Medication Sig     acetaminophen (MAPAP EXTRA STRENGTH) 500 MG tablet Take 2 tablets (1,000 mg total) by mouth 3 (three) times a day. -Not to exceed 4000 Mg APAP/24Hrs     amLODIPine (NORVASC) 5 MG tablet Take 1 tablet (5 mg total) by mouth daily.     aspirin 81 mg chewable tablet 1 TAB BY MOUTH DAILY (8PM)     famotidine (PEPCID) 20 MG tablet Take 1 tablet (20 mg total) by mouth daily.     isosorbide mononitrate (IMDUR) 30 MG 24 hr tablet Take 1 tablet (30 mg total) by mouth daily.     lidocaine (ASPERCREME, LIDOCAINE,) 4 % patch Place 2 patches on the skin daily. Remove and discard patch with 12 hours or as directed by MD.     losartan (COZAAR) 50 MG tablet Take 1 tablet (50 mg total) by mouth daily.     melatonin 3 mg Tab tablet Take 1 tablet (3 mg total) by mouth at bedtime.     menthol-zinc oxide (CALMOSEPTINE) 0.44-20.6 % Oint ointment Apply to buttock 4 times daily as needed for skin irritation     metoprolol succinate (TOPROL-XL) 50 MG 24 hr tablet Take 25 mg by mouth at bedtime.     metoprolol succinate (TOPROL-XL) 50 MG 24 hr tablet Take 50 mg by mouth every  "morning.     nitroglycerin (NITROSTAT) 0.4 MG SL tablet DISSOLVE 1 TAB UNDER TONGUE EVERY 5 MINUTES UP TO 3 DOSES AS NEEDED FOR CHEST PAIN     OLANZapine (ZYPREXA) 5 MG tablet Take 5 mg by mouth at bedtime.     rivastigmine (EXELON) 4.6 mg/24 hr Place 1 patch on the skin daily.     senna-docusate (PERICOLACE) 8.6-50 mg tablet Take 1 tablet by mouth 2 (two) times a day.     sertraline (ZOLOFT) 100 MG tablet 1 TAB BY MOUTH EVERY MORNING     tamsulosin (FLOMAX) 0.4 mg Cp24 Take 1 capsule (0.4 mg total) by mouth daily.     traZODone (DESYREL) 50 MG tablet Take 0.5 tablets (25 mg total) by mouth at bedtime.     ALLERGIES: No Known Allergies    DIET: Regular, regular texture, thin liquids    Vitals:    10/16/18 1804   BP: 94/69   Pulse: 74   Resp: 20   Temp: 97.9  F (36.6  C)   SpO2: 98%   Weight: 201 lb 9.6 oz (91.4 kg)   Height: 5' 7\" (1.702 m)   Continue to monitor elevated blood pressures.  Body mass index is 31.58 kg/(m^2).    EXAMINATION:   General: Pleasant, obese elderly male, up ambulating independently with a 2 wheeled walker, in no apparent distress.  He does not know why he is here, but he is able to direct me to the correct room.  He does look a bit winded once we arrive.  Head: Normocephalic and atraumatic.   Eyes: PERRLA, sclerae clear.   ENT: Moist oral mucosa.  He has his own teeth, some broken on top.  No rhinorrhea or nasal discharge.  Hearing may be mildly impaired.  Cardiovascular: Regular rate and rhythm with a 2/6 systolic ejection murmur at the left MCL.   Respiratory: Lungs clear to auscultation bilaterally.   Abdomen: Obese but soft and nontender.   Musculoskeletal/Extremities: Age-related degenerative joint disease.  No peripheral edema.  Neurologic: Slight upper extremity tremor noted, particularly on the right.  Integument: No rashes, clinically significant lesions, or skin breakdown.   Cognitive/Psychiatric: Cognitive deficits are rather obvious.  When asked the year, he tells me 2013.  When " asked the month, he tells me October.    DIAGNOSTICS:   Results for orders placed or performed during the hospital encounter of 09/19/18   Basic Metabolic Panel   Result Value Ref Range    Sodium 138 136 - 145 mmol/L    Potassium 3.8 3.5 - 5.0 mmol/L    Chloride 110 (H) 98 - 107 mmol/L    CO2 19 (L) 22 - 31 mmol/L    Anion Gap, Calculation 9 5 - 18 mmol/L    Glucose 154 (H) 70 - 125 mg/dL    Calcium 9.0 8.5 - 10.5 mg/dL    BUN 31 (H) 8 - 28 mg/dL    Creatinine 1.24 0.70 - 1.30 mg/dL    GFR MDRD Af Amer >60 >60 mL/min/1.73m2    GFR MDRD Non Af Amer 55 (L) >60 mL/min/1.73m2     Lab Results   Component Value Date    WBC 7.5 09/23/2018    HGB 12.6 (L) 09/23/2018    HCT 38.4 (L) 09/23/2018    MCV 97 09/23/2018     09/23/2018     CrCl cannot be calculated (Patient's most recent sCr result is older than the maximum 5 days allowed.).  Lab Results   Component Value Date    HGBA1C 6.9 (H) 07/26/2018     Lab Results   Component Value Date    ALT 56 (H) 09/24/2018    AST 34 09/24/2018    ALKPHOS 256 (H) 09/24/2018    BILITOT 0.5 09/24/2018       ASSESSMENT/Plan:      ICD-10-CM    1. Late onset Alzheimer's disease with behavioral disturbance G30.1     F02.81    2. Coronary artery disease of native artery of native heart with stable angina pectoris (H) I25.118    3. Benign prostatic hyperplasia with urinary retention N40.1     R33.8    4. Controlled type 2 diabetes mellitus without complication, without long-term current use of insulin (H) E11.9    5. Constipation, unspecified constipation type K59.00      CHANGES:    Discontinue oxycodone.    The patient is, or has been, under my care and I have initiated the establishment of the plan of care. This patient will be followed by a physician who will periodically review the plan of care.  Initial follow-up should be within 7-10 days.    Approximate time spent with this patient was greater than 30 minutes with greater than 50% spent in discussions regarding services required  upon discharge.      The above has been created using voice recognition software. Please be aware that this may unintentionally  produce inaccuracies and/or nonsensical sentences.      Electronically signed by: Niall Brown CNP

## 2021-06-24 NOTE — PROGRESS NOTES
Assessment / Impression   1.  Dementia with Lewy bodies  2.  Delirium  3.  Depression      Plan:   1.  Increase transdermal rivastigmine to 9.5 mg per 24-hour patch  2.  With continued evidence of depression, consider advancing antidepressant treatment program on return to clinic  3.  OT evaluation on return to clinic     long conversation held with the patient and daughters Alissa and Ameena in attendance.  Total time for this evaluation was 25 minutes with majority time spent in counseling.  This patient with dementia with Lewy bodies continues to demonstrate elements of confusion while in a dementia specific program despite a 4.6 mg per 24-hour patch.  In addition, evidence of depression with possibly mood congruent delusions (delusions of marital infidelity) are noted.  I explained to the family the nature of the patient's ideations.  I believe his somatic concerns may also be reflective of depression.      Subjective:     HPI: Derrick Silva is a 87 y.o. male with above-noted diagnoses who is seen in follow-up.  The patient continues to voice significant negativity while in the dementia level program.  In addition, he is expressing separation anxiety, delusions of marital infidelity.  On questioning today he does admit to significant symptoms of depression.  He also has multiple somatic complaints that would appear to possibly be more related to emotional state as opposed to a physical ailment.          Review of Systems:          As above        Objective:   There were no vitals filed for this visit.    No results found for this or any previous visit (from the past 24 hour(s)).    Physical Exam: Casually dressed, the patient seated for evaluation.  He is alert pleasant but quite apathetic in the exam room.  No evidence of physical distress noted.  The patient admits to symptoms of depression and he does admit to delusional thoughts as noted above.  He apparently has been experiencing some falls on occasion.  No  evidence of fluctuating level of consciousness or tai distractibility.  Affect is constricted and mood depressed.

## 2021-06-26 NOTE — PROGRESS NOTES
Progress Notes by Adolfo Ellis MD (Ted) at 6/18/2018  3:30 PM     Author: Adolfo Ellis MD (Ted) Service: -- Author Type: Physician    Filed: 6/18/2018  3:22 PM Encounter Date: 6/18/2018 Status: Signed    : Adolfo Ellis MD (Ted) (Physician)           Click to link to [facility] HEART [facility] NOTE    Thank you, Dr. Tan, for asking the [facility] to evaluate Mr. Derrick Silva.      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    By physical exam he has minimal aortic stenosis only.  It is unlikely that he will ever require intervention to the valve.    He has multivessel coronary artery disease including chronic total occlusion of several branches.  We should continue with conservative medical approach as long as he is no acceleration of symptoms.    Follow-up visit and echo in 1 year       History of Present Illness    Mr. Derrick Silva is a 86 y.o. male who comes in to establish cardiology care.  He was previously seen at [facility].  He has a history of multivessel coronary artery disease.  Coronary angiogram at  in 2010 showed 50% proximal LAD 90% mid to distal LAD 70 percent mid % first marginal 100% mid RCA 50% proximal circumflex.  Vessels were heavily calcified and not optimal for percutaneous intervention.  In April 2018 echo showed normal LVEF 55%, mild aortic stenosis with 13 mm mean gradient and valve area 1.6 cm.  There was no other significant lesions.  Today he denies increasing chest pain or shortness of breath.  His weight has been stable.  He has no PND and orthopnea.  He  cannot remember when was last Sunday he took sublingual nitroglycerin.    ECG: Personally reviewed.  Normal sinus rhythm V paced    Pacemaker interrogation: normal device function, short runs of nonsustained VT     Physical Examination Review of Systems   Vitals:    06/18/18 1427   BP: 118/78   Pulse: (!) 58   Resp: 18     Body mass index is 32.75 kg/(m^2).  Wt Readings from Last 3 Encounters:   06/18/18 202 lb 14.4 oz (92 kg)   06/18/18 202 lb 14.4 oz (92 kg)   06/12/18 210 lb (95.3 kg)     General Appearance:   Alert, cooperative, no distress, appears stated age   Head/ENT: Normocephalic, without obvious abnormality. Membranes moist      EYES:  no scleral icterus, normal conjunctivae   Neck: Supple, symmetrical, trachea midline, no adenopathy, thyroid: not enlarged, symmetric, no carotid bruit or JVD   Chest/Lungs:   Lungs are clear to auscultation, respirations unlabored. No tenderness or deformity    Cardiovascular:   Regular rhythm, S1, S2 normal, no  rub or gallop.  Soft ejection murmur at the aortic area   Abdomen:  Soft, non-tender, bowel sounds active all four quadrants,  no masses, no organomegaly   Extremities: no cyanosis or clubbing. No edema   Skin: Skin color, texture, turgor normal, no rashes or lesions.    Psychiatric: Normal affect, calm   Neurologic: Alert and oriented x 3, moving all four extremities.     General: WNL  Eyes: WNL  Ears/Nose/Throat: WNL  Lungs: Cough, Shortness of Breath, Snoring, Wheezing  Heart: Chest Pain, Arm Pain, Shortness of Breath with activity, Irregular Heartbeat, Leg Swelling  Stomach: Constipation, Heartburn, Nausea  Bladder: WNL  Muscle/Joints: Joint Pain, Muscle Weakness  Skin: Poor Wound Healing, Rash  Nervous System: Daytime Sleepiness, Dizziness, Loss of Balance  Mental Health: Depression     Blood: Easy Bruising     Medical History  Surgical History Family History Social History   Past Medical History:   Diagnosis Date   ? Aortic stenosis    ? Arthritis    ?  Cancer (H)     prostate   ? Chronic pain    ? Constipation    ? Depression    ? Diabetes mellitus (H)    ? Diabetes mellitus, type II (H)    ? Eczema    ? GERD (gastroesophageal reflux disease)    ? Heart disease    ? Hyperlipidemia    ? Hypertension    ? Osteoarthritis    ? Pacemaker     Past Surgical History:   Procedure Laterality Date   ? CARDIAC PACEMAKER PLACEMENT     ? CARPAL TUNNEL RELEASE  2014   ? HEMORRHOID SURGERY      no family history of premature coronary artery disease Social History     Social History   ? Marital status:      Spouse name: N/A   ? Number of children: 7   ? Years of education: 8     Occupational History   ? Not on file.     Social History Main Topics   ? Smoking status: Never Smoker   ? Smokeless tobacco: Never Used   ? Alcohol use No   ? Drug use: No   ? Sexual activity: Not on file     Other Topics Concern   ? Not on file     Social History Narrative          Medications  Allergies   Current Outpatient Prescriptions   Medication Sig Dispense Refill   ? aspirin-calcium carbonate 81 mg-300 mg calcium(777 mg) Tab Take 81 mg by mouth.     ? docusate sodium (COLACE) 100 MG capsule Take 100 mg by mouth.     ? gabapentin (NEURONTIN) 100 MG capsule Take 100 mg by mouth at bedtime. 30 capsule 1   ? metFORMIN (GLUCOPHAGE) 500 MG tablet 500 mg 2 (two) times a day with meals.     ? nitroglycerin (NITROSTAT) 0.4 MG SL tablet Place 0.4 mg under the tongue.     ? oxyCODONE-acetaminophen (PERCOCET) 5-325 mg per tablet Take 1 tablet by mouth 2 (two) times a day. 60 tablet 0   ? polyethylene glycol (GLYCOLAX) 17 gram/dose powder Take 17 g by mouth daily as needed. 119 g 0   ? sertraline (ZOLOFT) 100 MG tablet Take 1 tablet (100 mg total) by mouth daily. 90 tablet 0   ? simvastatin (ZOCOR) 40 MG tablet Take 0.5 tablets (20 mg total) by mouth at bedtime. 60 tablet 0   ? tamsulosin (FLOMAX) 0.4 mg Cp24 Take 0.4 mg by mouth daily.     ? acetaminophen (TYLENOL EXTRA STRENGTH) 500 MG tablet Take 2  tablets (1,000 mg total) by mouth 3 (three) times a day. 100 tablet 2   ? amLODIPine (NORVASC) 10 MG tablet Take 10 mg by mouth daily.     ? famotidine (PEPCID) 20 MG tablet Take 20 mg by mouth.     ? losartan (COZAAR) 50 MG tablet Take 50 mg by mouth.     ? menthol 2.5 % Gel Apply topically.     ? metoprolol succinate (TOPROL-XL) 50 MG 24 hr tablet Take 50 mg by mouth. 1 tablet in the AM, half tablet in the PM       No current facility-administered medications for this visit.       No Known Allergies      Lab Results    Chemistry/lipid CBC Cardiac Enzymes/BNP/TSH/INR   Lab Results   Component Value Date    CREATININE 1.48 (H) 04/20/2018    BUN 28 04/20/2018    K 4.4 04/20/2018     04/20/2018     (H) 04/20/2018    CO2 18 (L) 04/20/2018    Lab Results   Component Value Date    WBC 7.8 04/19/2018    HGB 15.6 04/19/2018    HCT 45.6 04/19/2018    MCV 92 04/19/2018     04/19/2018    Lab Results   Component Value Date    CKTOTAL 132 04/19/2018    TROPONINI <0.01 04/19/2018    BNP 44 04/19/2018

## 2021-07-03 NOTE — ADDENDUM NOTE
Addendum Note by Sheba Khoury CMA at 3/11/2019  1:49 PM     Author: Sheba Khoury CMA Service: -- Author Type: Certified Medical Assistant    Filed: 3/11/2019  1:49 PM Date of Service: 3/11/2019  1:49 PM Status: Signed    : Sheba Khoury CMA (Certified Medical Assistant)    Encounter addended by: Sheba Khoury CMA on: 3/11/2019  1:49 PM      Actions taken: Charge Capture section accepted

## 2021-07-03 NOTE — ADDENDUM NOTE
Addendum Note by Jennifer Tan MD at 7/5/2018  7:21 AM     Author: Jennifer Tan MD Service: -- Author Type: Physician    Filed: 7/5/2018  7:21 AM Encounter Date: 7/3/2018 Status: Signed    : Jennifer Tan MD (Physician)    Addended by: JENNIFER ATN on: 7/5/2018 07:21 AM        Modules accepted: Orders

## 2021-07-03 NOTE — ADDENDUM NOTE
Addendum Note by Sheba Khoury CMA at 8/28/2018 11:59 PM     Author: Sheba Khoury CMA Service: -- Author Type: Certified Medical Assistant    Filed: 9/4/2018 11:23 AM Date of Service: 8/28/2018 11:59 PM Status: Signed    : Sheba Khoury CMA (Certified Medical Assistant)    Encounter addended by: Sheba Khoury CMA on: 9/4/2018 11:23 AM<BR>     Actions taken: Charge Capture section accepted

## 2021-07-03 NOTE — ADDENDUM NOTE
Addendum Note by Sheba Khoury CMA at 10/29/2018  2:15 PM     Author: Sheba Khoury CMA Service: -- Author Type: Certified Medical Assistant    Filed: 10/29/2018  2:15 PM Date of Service: 10/29/2018  2:15 PM Status: Signed    : Sheba Khoury CMA (Certified Medical Assistant)    Encounter addended by: Sheba Khoury CMA on: 10/29/2018  2:15 PM<BR>     Actions taken: Charge Capture section accepted

## 2021-07-03 NOTE — ADDENDUM NOTE
Addendum Note by Sj Olguin MD at 6/13/2018  4:32 PM     Author: Sj Olguin MD Service: -- Author Type: Physician    Filed: 6/13/2018  4:32 PM Encounter Date: 6/12/2018 Status: Signed    : Sj Olguin MD (Physician)    Addended by: SJ OLGUIN on: 6/13/2018 04:32 PM        Modules accepted: Orders